# Patient Record
Sex: FEMALE | Race: WHITE | NOT HISPANIC OR LATINO | Employment: OTHER | URBAN - METROPOLITAN AREA
[De-identification: names, ages, dates, MRNs, and addresses within clinical notes are randomized per-mention and may not be internally consistent; named-entity substitution may affect disease eponyms.]

---

## 2017-02-07 ENCOUNTER — TELEPHONE (OUTPATIENT)
Dept: PHYSICAL MEDICINE AND REHAB | Facility: CLINIC | Age: 37
End: 2017-02-07

## 2017-02-07 DIAGNOSIS — S78.112A ABOVE KNEE AMPUTATION OF LEFT LOWER EXTREMITY: Primary | ICD-10-CM

## 2017-02-07 NOTE — TELEPHONE ENCOUNTER
----- Message from Deya Jackson MA sent at 2/7/2017 11:04 AM CST -----  Contact: pt [phone]  Would like order to restart PT. Her therapist recommended she stop for awhile now she is ready to start again. [facility]      ----- Message -----     From: Neeru Rust     Sent: 2/6/2017   3:35 PM       To: Angela VELAZQUEZ Staff    Pt is calling to speak with nurse regarding a order for physical therapy.pls call

## 2017-05-17 ENCOUNTER — TELEPHONE (OUTPATIENT)
Dept: PHYSICAL MEDICINE AND REHAB | Facility: HOSPITAL | Age: 37
End: 2017-05-17

## 2017-05-17 DIAGNOSIS — R26.81 GAIT INSTABILITY: Primary | ICD-10-CM

## 2017-05-17 NOTE — TELEPHONE ENCOUNTER
----- Message from Deya Jackson MA sent at 5/16/2017  4:26 PM CDT -----  Contact: Self [phone]      ----- Message -----     From: Ian Aadms     Sent: 5/16/2017   4:11 PM       To: Angela VELAZQUEZ Staff    Patient is calling to get orders to go back to PT.

## 2017-05-25 ENCOUNTER — TELEPHONE (OUTPATIENT)
Dept: PHYSICAL MEDICINE AND REHAB | Facility: CLINIC | Age: 37
End: 2017-05-25

## 2017-05-25 NOTE — TELEPHONE ENCOUNTER
----- Message from Deya Jackson MA sent at 5/19/2017  9:32 AM CDT -----  Contact: Patient [phone]   Herminia velazquez message asking for the fax # to the PT location where the patient want the orders faxed.     ----- Message -----     From: Giovana Lebron     Sent: 5/18/2017   4:37 PM       To: Angela VELAZQUEZ Staff    Patient is calling back in regards to the order request for her Physical Therapy, patient states that [facility] has not receive the orders yet. Please call patient states if she misses the call can a detailed message be left on voicemail.

## 2017-05-25 NOTE — TELEPHONE ENCOUNTER
Orders faxed to  [phone]    [facility]  [address]  [place] [address]  Phone [phone]

## 2017-05-30 ENCOUNTER — CLINICAL SUPPORT (OUTPATIENT)
Dept: REHABILITATION | Facility: HOSPITAL | Age: 37
End: 2017-05-30
Attending: PHYSICAL MEDICINE & REHABILITATION
Payer: MEDICAID

## 2017-05-30 DIAGNOSIS — R26.81 GAIT INSTABILITY: Primary | ICD-10-CM

## 2017-05-30 DIAGNOSIS — R19.8 ABDOMINAL WEAKNESS: ICD-10-CM

## 2017-05-30 PROCEDURE — 97161 PT EVAL LOW COMPLEX 20 MIN: CPT | Mod: PO

## 2017-05-30 NOTE — PATIENT INSTRUCTIONS
"Abdominal "X" Crunch (TFA)        Tighten stomach muscles to tilt pelvis and flatten back. Lift shoulder toward opposite hip. Repeat to other side.  Hold 5 seconds each side. Repeat 10 times each side, alternating. Do 2-3 sessions per day.    Copyright © I. All rights reserved.     Bridging / Pelvic Rotation (TFA)        Lift sound hip up while allowing residual limb hip to hang. Then lift residual limb hip as high as possible.  Hold 10 seconds. Repeat 15 times. Do 2-3 sessions per day.    Copyright © I. All rights reserved.   Push-Up (Kneeling) TFA        On sound knee and hands, with back straight, lower body toward surface and push up. Continue breathing normally.  Repeat 2 x 10 times. Do 2-3 sessions per day.    Copyright © [employer]. All rights reserved.   Prone Plank (Eccentric)        On toes and elbows, pull abdomen in while stabilizing trunk.    Hold 30 seconds working up to 60 seconds,  10 times, 2-3 sets per day.      [email or web address]     Copyright © I. All rights reserved.     "

## 2017-05-30 NOTE — PROGRESS NOTES
OUTPATIENT NEUROLOGICAL REHABILITATION  PHYSICAL THERAPY EVALUATION    Name: Val Gil  Clinic Number: [id number]    Diagnosis:   Encounter Diagnoses   Name Primary?    Abdominal weakness     Gait instability Yes     Physician: Shay Miller MD  Treatment Orders: PT Eval and Treat  Past Medical History:   Diagnosis Date    Angiosarcoma     Angiosarcoma     Anxiety     Bone cancer     Depression        Evaluation Date: 5/31/17  Visit #: 1  Plan of care expiration: 7/25/17  Precautions: fall risk    History     Medical Diagnosis: Gait instability, s/p R hip disarticulation amputation   PT Diagnosis: core weakness, gait instability    History of Present Illness: Val is a 36 y.o. female that presents to [facility] secondary to core weakness and deconditioning s/p R hip disarticulation amputation. She has been approved to go see a specialist for an intensive 2 week prosthetic leg fitting and training end of July but was told she needs to be in shape to tolerate the prosthetic leg training. Minimum requirements from the facility in [place] that she will be attending are to be able to do 200 crutch steps with minimal fatigue, and 100 crunches daily.     Chief complaints:  1. Improving core strength in prep for prosthetic training  2. Improving gait endurance in prep for prosthetic training     Prior Therapy: OP PT 1 year ago  Nutrition:  Overweight  Social History/Support systems: living with her Dad  Place of Residence (Steps/Adaptations/Levels): ramps to enter home  Exercise routine: not doing any formal exercising - but in general active in errands around the community.   DME owned: crutches, manual WC    Subjective   Pt stated goals: To improve core strength in prep for prosthetic training   Pain: phantom pain 5/10 - constant but fluctuates     Objective   - Follows commands: 100% of time   - Speech: no deficits     Mental status: alert, oriented to person, place, and  time  Appearance: Casually dressed  Behavior:  cooperative  Attention Span and Concentration:  Normal    Dominant hand:  right     Sensation: Light Touch: intact at intact limbs, phantom pain at removed R LE         ROM:   UPPER EXTREMITY--AROM/PROM  (R) UE: WNLs  (L) UE: WNLs         RANGE OF MOTION--LOWER EXTREMITIES  (R) LE - N/A    (L) LE: Hip: normal   Knee: normal   Ankle: normal    Strength: manual muscle test grades below   Upper Extremity Strength  (R) UE  (L) UE    Shoulder Flexion: 5/5 Shoulder Flexion: 5/5   Shoulder Abduction: 5/5 Shoulder Abduction: 5/5   Shoulder Extension: 5/5 Shoulder Extension:   5/5   Elbow Flexion: 5/5 Elbow Flexion: 5/5   Elbow Extension: 5/5 Elbow Extension: 5/5   Wrist Flexion: 5/5 Wrist Flexion: 5/5   Wrist Extension: 5/5 Wrist Extension: 5/5   : 5/5 : 5/5     Lower Extremity Strength  Right LE-   N/A - amputee  Left LE     Hip Flexion: 5/5    Hip Extension: 5/5    Hip Abduction: 5/5    Hip Adduction 5/5    Knee Extension: 5/5    Knee Flexion: 5/5    Ankle Dorsiflexion: 5/5    Ankle Plantarflexion: 5/5     Abdominal Strength: 2+ Poor + Arms Able to lift head and clear scapulae x 10   Upper Abdominal Strength Grades    5 Normal Arm behind head x 10 reps  4+ Good + Arm across chest x 10 reps  4 Good Arm across chest x 5 reps  4- Good - Arm across chest x 1 reps  3+ Fair + Arms extended out in front x 10  3 Fair Arms extended out in front x 5  3- Fair - Arms extended out in front x 1  2+ Poor + Arms Able to lift head and clear scapulae x 10  2 Poor Able to lift head and clear scpulae x 5  2- Poor - Able to lift head and clear scpulae x 1  1 Trace Able to lift head only  0 Zero No contraction     Plank tolerance/ endurance: 43 sec, 17 sec, 27 sec     Flexibility: WNL    Gait Assessment:   - AD used: crutches  - Assistance: Nuris  - Distance: 250', moderate fatigue with mild shortness of breath at end of walk.     GAIT DEVIATIONS:  Val displays the following deviations  with ambulation: crutch walks with B crutches and LLE hop.     Endurance Deficit: moderate impairment     Functional Mobility (Bed mobility, transfers)  Bed mobility: I  Supine to sit: I  Sit to supine: I  Transfers to bed: I  Transfers to toilet: I  Sit to stand:  Mod I  Stand pivot:  Mod I  Car transfers: Mod I  Wheelchair mobility: Mod I    CMS Impairment/Limitation/Restriction for FOTO Lower Leg (w/o Knee) Survey  Status Limitation    Intake 42% 58%    Predicted 51% 49%          Education provided re:role of PT, goals for PT, scheduling - pt verbalized understanding. Discussed insurance limitations with pt.     Val verbalized good understanding of education provided.   Pt has no cultural, educational or language barriers to learning provided.      Assessment   This is a 36 y.o. female referred to outpatient physical therapy and presents with a medical diagnosis of core weakness and gait instability s/p R hip disarticulation >1 year ago.  Patient presents with a decline in her core strength since last discharged from therapy. She did not do well with the attempt to get her a prosthetic last year and has been accepted to a facility in [place] the end of July for an intensive training and new prosthetic. She needs to be in shape to tolerated the prosthetic training there which is why she is returning to this clinic. PT is warranted to address core strength, gait endurance and establish HEP.    PT/PTA met face to face to discuss pt's treatment plan and established goals. Pt will be seen by physical therapy every 6th visit and minimally once per month.     Pt rehab potential is Good. Pt will benefit from continuing skilled outpatient physical therapy to address the deficits listed below in the problem list, provide pt/family education and to maximize pt's level of independence in the home and community environment.     History  Co-morbidities and personal factors that may impact the plan of care Examination  Body  Structures and Functions, activity limitations and participation restrictions that may impact the plan of care. Medical necessity is demonstrated by the following impairments. Clinical Presentation Decision Making/ Complexity Score   1. Hx poor compliance with HEP and therapy attendance  2. Anxiety/ mood disorder 1. Core weakness  2. Impaired gait endurance   stable    moderate low low low       Pt's spiritual, cultural and educational needs considered and pt agreeable to plan of care and goals as stated below:     GOALS:   Short term goals: 4 weeks, pt agrees to goals set.  3. Pt will improve core strength as indicated by plank time to at least 45 seconds, 3 reps in a row in order to better tolerate prosthetic training.   4. Pt will improve core MMT to at least 3/5 in order to better tolerate prosthetic training.   5. Pt will ambulate x 500' with minimal SOB using crutches in order to better tolerate prosthetic training.   6. Pt will have HEP in place for core strength.     Long term goals: 8 weeks, pt agrees to goals set  7. Pt will improve core strength as indicated by plank time to at least 60 seconds, 3 reps in a row in order to better tolerate prosthetic training.   8. Pt will improve core MMT to at least 4-/5 in order to better tolerate prosthetic training.   9. Pt will ambulate x 1000' with minimal SOB using crutches in order to better tolerate prosthetic training.   10. Pt will be independent with HEP at discharge.       Plan   Outpatient physical therapy 2-3 times weekly to include: Pt Education, HEP, therapeutic exercises, neuromuscular re-education, therapeutic activities, manual therapy, joint mobilizations, aquatic therapy and modalities PRN to achieve established goals. Pt may be seen by PTA as part of the rehabilitation team.     Cont PT for up to 8 weeks.     Abril Rider, PT  05/31/2017

## 2017-05-31 PROBLEM — R19.8 ABDOMINAL WEAKNESS: Status: ACTIVE | Noted: 2017-05-31

## 2017-05-31 NOTE — PLAN OF CARE
OUTPATIENT NEUROLOGICAL REHABILITATION  PHYSICAL THERAPY EVALUATION    Name: Val Gil  Clinic Number: [id number]    Diagnosis:   Encounter Diagnoses   Name Primary?    Abdominal weakness     Gait instability Yes     Physician: Shay Miller MD  Treatment Orders: PT Eval and Treat  Past Medical History:   Diagnosis Date    Angiosarcoma     Angiosarcoma     Anxiety     Bone cancer     Depression        Evaluation Date: 5/31/17  Visit #: 1  Plan of care expiration: 7/25/17  Precautions: fall risk    History     Medical Diagnosis: Gait instability, s/p R hip disarticulation amputation   PT Diagnosis: core weakness, gait instability    History of Present Illness: Val is a 36 y.o. female that presents to [facility] secondary to core weakness and deconditioning s/p R hip disarticulation amputation. She has been approved to go see a specialist for an intensive 2 week prosthetic leg fitting and training end of July but was told she needs to be in shape to tolerate the prosthetic leg training. Minimum requirements from the facility in [place] that she will be attending are to be able to do 200 crutch steps with minimal fatigue, and 100 crunches daily.     Chief complaints:  1. Improving core strength in prep for prosthetic training  2. Improving gait endurance in prep for prosthetic training     Prior Therapy: OP PT 1 year ago  Nutrition:  Overweight  Social History/Support systems: living with her Dad  Place of Residence (Steps/Adaptations/Levels): ramps to enter home  Exercise routine: not doing any formal exercising - but in general active in errands around the community.   DME owned: crutches, manual WC    Subjective   Pt stated goals: To improve core strength in prep for prosthetic training   Pain: phantom pain 5/10 - constant but fluctuates     Objective   - Follows commands: 100% of time   - Speech: no deficits     Mental status: alert, oriented to person, place, and  time  Appearance: Casually dressed  Behavior:  cooperative  Attention Span and Concentration:  Normal    Dominant hand:  right     Sensation: Light Touch: intact at intact limbs, phantom pain at removed R LE         ROM:   UPPER EXTREMITY--AROM/PROM  (R) UE: WNLs  (L) UE: WNLs         RANGE OF MOTION--LOWER EXTREMITIES  (R) LE - N/A    (L) LE: Hip: normal   Knee: normal   Ankle: normal    Strength: manual muscle test grades below   Upper Extremity Strength  (R) UE  (L) UE    Shoulder Flexion: 5/5 Shoulder Flexion: 5/5   Shoulder Abduction: 5/5 Shoulder Abduction: 5/5   Shoulder Extension: 5/5 Shoulder Extension:   5/5   Elbow Flexion: 5/5 Elbow Flexion: 5/5   Elbow Extension: 5/5 Elbow Extension: 5/5   Wrist Flexion: 5/5 Wrist Flexion: 5/5   Wrist Extension: 5/5 Wrist Extension: 5/5   : 5/5 : 5/5     Lower Extremity Strength  Right LE-   N/A - amputee  Left LE     Hip Flexion: 5/5    Hip Extension: 5/5    Hip Abduction: 5/5    Hip Adduction 5/5    Knee Extension: 5/5    Knee Flexion: 5/5    Ankle Dorsiflexion: 5/5    Ankle Plantarflexion: 5/5     Abdominal Strength: 2+ Poor + Arms Able to lift head and clear scapulae x 10   Upper Abdominal Strength Grades    5 Normal Arm behind head x 10 reps  4+ Good + Arm across chest x 10 reps  4 Good Arm across chest x 5 reps  4- Good - Arm across chest x 1 reps  3+ Fair + Arms extended out in front x 10  3 Fair Arms extended out in front x 5  3- Fair - Arms extended out in front x 1  2+ Poor + Arms Able to lift head and clear scapulae x 10  2 Poor Able to lift head and clear scpulae x 5  2- Poor - Able to lift head and clear scpulae x 1  1 Trace Able to lift head only  0 Zero No contraction     Plank tolerance/ endurance: 43 sec, 17 sec, 27 sec     Flexibility: WNL    Gait Assessment:   - AD used: crutches  - Assistance: Nuris  - Distance: 250', moderate fatigue with mild shortness of breath at end of walk.     GAIT DEVIATIONS:  Val displays the following deviations  with ambulation: crutch walks with B crutches and LLE hop.     Endurance Deficit: moderate impairment     Functional Mobility (Bed mobility, transfers)  Bed mobility: I  Supine to sit: I  Sit to supine: I  Transfers to bed: I  Transfers to toilet: I  Sit to stand:  Mod I  Stand pivot:  Mod I  Car transfers: Mod I  Wheelchair mobility: Mod I    CMS Impairment/Limitation/Restriction for FOTO Lower Leg (w/o Knee) Survey  Status Limitation    Intake 42% 58%    Predicted 51% 49%          Education provided re:role of PT, goals for PT, scheduling - pt verbalized understanding. Discussed insurance limitations with pt.     Val verbalized good understanding of education provided.   Pt has no cultural, educational or language barriers to learning provided.      Assessment   This is a 36 y.o. female referred to outpatient physical therapy and presents with a medical diagnosis of core weakness and gait instability s/p R hip disarticulation >1 year ago.  Patient presents with a decline in her core strength since last discharged from therapy. She did not do well with the attempt to get her a prosthetic last year and has been accepted to a facility in [place] the end of July for an intensive training and new prosthetic. She needs to be in shape to tolerated the prosthetic training there which is why she is returning to this clinic. PT is warranted to address core strength, gait endurance and establish HEP.    PT/PTA met face to face to discuss pt's treatment plan and established goals. Pt will be seen by physical therapy every 6th visit and minimally once per month.     Pt rehab potential is Good. Pt will benefit from continuing skilled outpatient physical therapy to address the deficits listed below in the problem list, provide pt/family education and to maximize pt's level of independence in the home and community environment.     History  Co-morbidities and personal factors that may impact the plan of care Examination  Body  Structures and Functions, activity limitations and participation restrictions that may impact the plan of care. Medical necessity is demonstrated by the following impairments. Clinical Presentation Decision Making/ Complexity Score   1. Hx poor compliance with HEP and therapy attendance  2. Anxiety/ mood disorder 1. Core weakness  2. Impaired gait endurance   stable    moderate low low low       Pt's spiritual, cultural and educational needs considered and pt agreeable to plan of care and goals as stated below:     GOALS:   Short term goals: 4 weeks, pt agrees to goals set.  3. Pt will improve core strength as indicated by plank time to at least 45 seconds, 3 reps in a row in order to better tolerate prosthetic training.   4. Pt will improve core MMT to at least 3/5 in order to better tolerate prosthetic training.   5. Pt will ambulate x 500' with minimal SOB using crutches in order to better tolerate prosthetic training.   6. Pt will have HEP in place for core strength.     Long term goals: 8 weeks, pt agrees to goals set  7. Pt will improve core strength as indicated by plank time to at least 60 seconds, 3 reps in a row in order to better tolerate prosthetic training.   8. Pt will improve core MMT to at least 4-/5 in order to better tolerate prosthetic training.   9. Pt will ambulate x 1000' with minimal SOB using crutches in order to better tolerate prosthetic training.   10. Pt will be independent with HEP at discharge.       Plan   Outpatient physical therapy 2-3 times weekly to include: Pt Education, HEP, therapeutic exercises, neuromuscular re-education, therapeutic activities, manual therapy, joint mobilizations, aquatic therapy and modalities PRN to achieve established goals. Pt may be seen by PTA as part of the rehabilitation team.     Cont PT for up to 8 weeks.     Abril Rider, PT  05/31/2017

## 2017-06-06 ENCOUNTER — CLINICAL SUPPORT (OUTPATIENT)
Dept: REHABILITATION | Facility: HOSPITAL | Age: 37
End: 2017-06-06
Attending: PHYSICAL MEDICINE & REHABILITATION
Payer: MEDICAID

## 2017-06-06 DIAGNOSIS — R19.8 ABDOMINAL WEAKNESS: ICD-10-CM

## 2017-06-06 DIAGNOSIS — R26.81 GAIT INSTABILITY: ICD-10-CM

## 2017-06-06 PROCEDURE — 97110 THERAPEUTIC EXERCISES: CPT | Mod: PO

## 2017-06-06 NOTE — PROGRESS NOTES
"                                                    Physical Therapy Progress Note     Name: Val Gil  Clinic Number: [id number]  Diagnosis:   Encounter Diagnoses   Name Primary?    Abdominal weakness     Gait instability      Physician: Shay Miller MD  Treatment Orders: PT Eval and Treat  Past Medical History:   Diagnosis Date    Angiosarcoma     Angiosarcoma     Anxiety     Bone cancer     Depression        Evaluation Date: 17  Visit #: 2  Plan of care expiration: 17  Precautions: fall risk    FOTO 2/10          Subjective   Pt reports: "Sorry I am late. I might never get that right." Pt reports compliance with HEP 3 days since last visit (every other day).   Pain Scale:  0/10    Pt late to session 15 min  Objective     Patient received individual therapy with activities as follows:     Val received therapeutic exercises to develop strength, endurance and core stabilization for 38 minutes includin/6/17   Bridges  x 2 min, 10 sec holds   Push ups on knee x 15, x4 (fatigued 2nd set)   Crunches  3 x 15 early in session (45)  3 x 20 mid session (60)   Isometric LTR X 2 min manual   Planks 33 sec, 45 sec, 35 sec   updog abdominal stretch 2 x15 sec   bicycles 3 x 10   ck pose X 60 sec   Forearm stretches    Crutch steps  100 steps, 7-8/10 [place]                     Written Home Exercises: Given at first session, see pt instructions 17.   Pt demo good understanding of the education provided. Val demonstrated good return demonstration of activities.     Education provided re: POC, HEP  No spiritual or educational barriers to learning provided    Pt has no cultural, educational or language barriers to learning provided.    Assessment   Val was late to her session which limited the total amount of exercises completed today. Reinforced with her the need to arrive on time for optimal benefit of therapy especially considering the point is to get her conditioned enough " for her upcoming intense 2 weeks for prosthetic fitting and training in [place]. She was able to complete 100 total crunches today but needed them spread throughout the 38 min. She was able to do the 100 crutch steps but was very fatigued and rated her effort up to a 8/10 on the [place]. She was short of breath after. Continue tx for core strength and endurance training.     Pt rehab potential is Good. Pt will benefit from continuing skilled outpatient physical therapy to address the deficits listed below in the problem list, provide pt/family education and to maximize pt's level of independence in the home and community environment.      History  Co-morbidities and personal factors that may impact the plan of care Examination  Body Structures and Functions, activity limitations and participation restrictions that may impact the plan of care. Medical necessity is demonstrated by the following impairments. Clinical Presentation Decision Making/ Complexity Score   1. Hx poor compliance with HEP and therapy attendance  2. Anxiety/ mood disorder 1. Core weakness  2. Impaired gait endurance stable     moderate low low low         Pt's spiritual, cultural and educational needs considered and pt agreeable to plan of care and goals as stated below:      GOALS:   Short term goals: 4 weeks, pt agrees to goals set.  3. Pt will improve core strength as indicated by plank time to at least 45 seconds, 3 reps in a row in order to better tolerate prosthetic training.   4. Pt will improve core MMT to at least 3/5 in order to better tolerate prosthetic training.   5. Pt will ambulate x 500' with minimal SOB using crutches in order to better tolerate prosthetic training.   6. Pt will have HEP in place for core strength.      Long term goals: 8 weeks, pt agrees to goals set  7. Pt will improve core strength as indicated by plank time to at least 60 seconds, 3 reps in a row in order to better tolerate prosthetic training.   8. Pt will improve  core MMT to at least 4-/5 in order to better tolerate prosthetic training.   9. Pt will ambulate x 1000' with minimal SOB using crutches in order to better tolerate prosthetic training.   10. Pt will be independent with HEP at discharge.     Plan   Continue PT 2-3x weekly under established Plan of Care, with treatment to include: pt education, HEP, therapeutic exercises, neuromuscular re-education/balance exercises, therapeutic activities, joint mobilizations, and modalities PRN, to work towards established goals. Pt may be seen by PTA to carry out plan of care.     Abril Rider, PT   06/06/2017

## 2017-06-06 NOTE — PATIENT INSTRUCTIONS
Child's Pose        Sit in knee-chest position and reach arms forward. Separate knees for comfort. Hold position for up to 60 seconds.    Copyright © I. All rights reserved.       Cobra        Lie face down, hands at shoulder. Inhale and press up torso, arching back. Keep long curve in neck, shoulders down, and buttocks engaged to protect lower back. Hold up to 60 seconds.     Copyright © [employer]. All rights reserved.       Wrist Extensors        Elbow straight, palm down. Place other hand with thumb on underside of wrist and fingers on back of hand. Slowly bend wrist down until stretch is felt on top of forearm. Hold 20 seconds.      Copyright © [employer]. All rights reserved.

## 2017-07-11 ENCOUNTER — DOCUMENTATION ONLY (OUTPATIENT)
Dept: REHABILITATION | Facility: HOSPITAL | Age: 37
End: 2017-07-11

## 2017-07-11 DIAGNOSIS — R19.8 ABDOMINAL WEAKNESS: ICD-10-CM

## 2017-07-11 DIAGNOSIS — R26.81 GAIT INSTABILITY: ICD-10-CM

## 2017-07-11 NOTE — PROGRESS NOTES
OUTPATIENT PHYSICAL THERAPY DISCHARGE SUMMARY     Name: Val Gil  Clinic Number: [id number]    Diagnosis:   Encounter Diagnoses   Name Primary?    Abdominal weakness     Gait instability      Physician: No ref. provider found  Treatment Orders: PT Eval and Treat  Past Medical History:   Diagnosis Date    Angiosarcoma     Angiosarcoma     Anxiety     Bone cancer     Depression        Initial visit: 5/30/17  Date of Last visit: 6/6/17  Date of Discharge Note:  7/11/17  Total Visits Received: 2  Missed Visits: 7  ASSESSMENT   Status Towards Goals Met:  Val only attended the evaluation and 1 follow up session, both of which were limited in time due to patient arriving late each session. We were able to establish an HEP for core strengthening at her follow up visit.     Goals Not achieved and why: No goals met or re-assessed due to patient non-compliance with attendance. She had 7 no shows and cancellations. Informed patient via phone that due to her non-compliance we were discharging.     Discharge reason : Non-Compliance with attendance    PLAN   This patient is discharged from Outpatient Physical Therapy Services.     Abril Rider, PT  07/11/2017

## 2017-12-28 ENCOUNTER — HOSPITAL ENCOUNTER (INPATIENT)
Facility: HOSPITAL | Age: 37
LOS: 7 days | Discharge: PSYCHIATRIC OR SUBSTANCE USE FACILITY | DRG: 917 | End: 2018-01-04
Attending: EMERGENCY MEDICINE | Admitting: INTERNAL MEDICINE
Payer: MEDICAID

## 2017-12-28 DIAGNOSIS — F11.221 OPIOID DEPENDENCE WITH INTOXICATION DELIRIUM: ICD-10-CM

## 2017-12-28 DIAGNOSIS — F13.20 BENZODIAZEPINE DEPENDENCE: ICD-10-CM

## 2017-12-28 DIAGNOSIS — G92.9 ENCEPHALOPATHY, TOXIC: ICD-10-CM

## 2017-12-28 DIAGNOSIS — F32.A DEPRESSION, UNSPECIFIED DEPRESSION TYPE: ICD-10-CM

## 2017-12-28 DIAGNOSIS — I49.9 ARRHYTHMIA: ICD-10-CM

## 2017-12-28 DIAGNOSIS — T43.012A: ICD-10-CM

## 2017-12-28 DIAGNOSIS — D72.829 LEUKOCYTOSIS, UNSPECIFIED TYPE: ICD-10-CM

## 2017-12-28 DIAGNOSIS — T50.901A OVERDOSE: ICD-10-CM

## 2017-12-28 DIAGNOSIS — T50.912A SUICIDE ATTEMPT BY MULTIPLE DRUG OVERDOSE, INITIAL ENCOUNTER: ICD-10-CM

## 2017-12-28 DIAGNOSIS — F11.20 OPIOID DEPENDENCE IN CONTROLLED ENVIRONMENT: ICD-10-CM

## 2017-12-28 DIAGNOSIS — T39.1X2A ACETAMINOPHEN OVERDOSE, INTENTIONAL SELF-HARM, INITIAL ENCOUNTER: ICD-10-CM

## 2017-12-28 DIAGNOSIS — T50.912A: ICD-10-CM

## 2017-12-28 LAB
ALBUMIN SERPL BCP-MCNC: 3.9 G/DL
ALP SERPL-CCNC: 125 U/L
ALT SERPL W/O P-5'-P-CCNC: 32 U/L
AMPHET+METHAMPHET UR QL: NEGATIVE
ANION GAP SERPL CALC-SCNC: 10 MMOL/L
APAP SERPL-MCNC: 21 UG/ML
AST SERPL-CCNC: 49 U/L
B-HCG UR QL: NEGATIVE
BARBITURATES UR QL SCN>200 NG/ML: NEGATIVE
BASOPHILS # BLD AUTO: 0.03 K/UL
BASOPHILS NFR BLD: 0.2 %
BENZODIAZ UR QL SCN>200 NG/ML: NORMAL
BILIRUB SERPL-MCNC: 0.6 MG/DL
BILIRUB UR QL STRIP: NEGATIVE
BUN SERPL-MCNC: 11 MG/DL
BZE UR QL SCN: NEGATIVE
CALCIUM SERPL-MCNC: 9.7 MG/DL
CANNABINOIDS UR QL SCN: NEGATIVE
CHLORIDE SERPL-SCNC: 101 MMOL/L
CLARITY UR REFRACT.AUTO: CLEAR
CO2 SERPL-SCNC: 26 MMOL/L
COLOR UR AUTO: NORMAL
CREAT SERPL-MCNC: 0.9 MG/DL
CREAT UR-MCNC: 28 MG/DL
CTP QC/QA: YES
DIFFERENTIAL METHOD: ABNORMAL
EOSINOPHIL # BLD AUTO: 0 K/UL
EOSINOPHIL NFR BLD: 0 %
ERYTHROCYTE [DISTWIDTH] IN BLOOD BY AUTOMATED COUNT: 11.9 %
EST. GFR  (AFRICAN AMERICAN): >60 ML/MIN/1.73 M^2
EST. GFR  (NON AFRICAN AMERICAN): >60 ML/MIN/1.73 M^2
GLUCOSE SERPL-MCNC: 103 MG/DL
GLUCOSE UR QL STRIP: NEGATIVE
HCT VFR BLD AUTO: 42.7 %
HGB BLD-MCNC: 14.6 G/DL
HGB UR QL STRIP: NEGATIVE
IMM GRANULOCYTES # BLD AUTO: 0.06 K/UL
IMM GRANULOCYTES NFR BLD AUTO: 0.3 %
KETONES UR QL STRIP: NEGATIVE
LEUKOCYTE ESTERASE UR QL STRIP: NEGATIVE
LYMPHOCYTES # BLD AUTO: 0.4 K/UL
LYMPHOCYTES NFR BLD: 2.3 %
MCH RBC QN AUTO: 29.9 PG
MCHC RBC AUTO-ENTMCNC: 34.2 G/DL
MCV RBC AUTO: 87 FL
METHADONE UR QL SCN>300 NG/ML: NEGATIVE
MONOCYTES # BLD AUTO: 0.3 K/UL
MONOCYTES NFR BLD: 1.8 %
NEUTROPHILS # BLD AUTO: 17 K/UL
NEUTROPHILS NFR BLD: 95.4 %
NITRITE UR QL STRIP: NEGATIVE
NRBC BLD-RTO: 0 /100 WBC
OPIATES UR QL SCN: NORMAL
PCP UR QL SCN>25 NG/ML: NEGATIVE
PH UR STRIP: 7 [PH] (ref 5–8)
PLATELET # BLD AUTO: 315 K/UL
PMV BLD AUTO: 10.2 FL
POCT GLUCOSE: 83 MG/DL (ref 70–110)
POTASSIUM SERPL-SCNC: 3.7 MMOL/L
PROT SERPL-MCNC: 7.9 G/DL
PROT UR QL STRIP: NEGATIVE
RBC # BLD AUTO: 4.89 M/UL
SALICYLATES SERPL-MCNC: <5 MG/DL
SODIUM SERPL-SCNC: 137 MMOL/L
SP GR UR STRIP: 1 (ref 1–1.03)
TOXICOLOGY INFORMATION: NORMAL
URN SPEC COLLECT METH UR: NORMAL
UROBILINOGEN UR STRIP-ACNC: NEGATIVE EU/DL
WBC # BLD AUTO: 17.85 K/UL

## 2017-12-28 PROCEDURE — 80307 DRUG TEST PRSMV CHEM ANLYZR: CPT

## 2017-12-28 PROCEDURE — 85025 COMPLETE CBC W/AUTO DIFF WBC: CPT

## 2017-12-28 PROCEDURE — 80320 DRUG SCREEN QUANTALCOHOLS: CPT

## 2017-12-28 PROCEDURE — 81003 URINALYSIS AUTO W/O SCOPE: CPT

## 2017-12-28 PROCEDURE — 81025 URINE PREGNANCY TEST: CPT | Performed by: EMERGENCY MEDICINE

## 2017-12-28 PROCEDURE — 96375 TX/PRO/DX INJ NEW DRUG ADDON: CPT

## 2017-12-28 PROCEDURE — 25000003 PHARM REV CODE 250: Performed by: EMERGENCY MEDICINE

## 2017-12-28 PROCEDURE — 12000002 HC ACUTE/MED SURGE SEMI-PRIVATE ROOM

## 2017-12-28 PROCEDURE — 96365 THER/PROPH/DIAG IV INF INIT: CPT | Mod: 59

## 2017-12-28 PROCEDURE — 96372 THER/PROPH/DIAG INJ SC/IM: CPT

## 2017-12-28 PROCEDURE — 96368 THER/DIAG CONCURRENT INF: CPT

## 2017-12-28 PROCEDURE — 96366 THER/PROPH/DIAG IV INF ADDON: CPT

## 2017-12-28 PROCEDURE — 80053 COMPREHEN METABOLIC PANEL: CPT

## 2017-12-28 PROCEDURE — 99285 EMERGENCY DEPT VISIT HI MDM: CPT | Mod: 25

## 2017-12-28 PROCEDURE — 80329 ANALGESICS NON-OPIOID 1 OR 2: CPT

## 2017-12-28 PROCEDURE — 96367 TX/PROPH/DG ADDL SEQ IV INF: CPT

## 2017-12-28 PROCEDURE — 93010 ELECTROCARDIOGRAM REPORT: CPT | Mod: ,,, | Performed by: INTERNAL MEDICINE

## 2017-12-28 RX ADMIN — SODIUM CHLORIDE 1000 ML: 0.9 INJECTION, SOLUTION INTRAVENOUS at 08:12

## 2017-12-29 PROBLEM — D72.829 LEUCOCYTOSIS: Status: ACTIVE | Noted: 2017-12-29

## 2017-12-29 PROBLEM — G92.9 ENCEPHALOPATHY, TOXIC: Status: ACTIVE | Noted: 2017-12-29

## 2017-12-29 PROBLEM — F41.9 ANXIETY: Status: ACTIVE | Noted: 2017-12-29

## 2017-12-29 PROBLEM — F11.229 OPIOID DEPENDENCE WITH INTOXICATION: Status: ACTIVE | Noted: 2017-12-29

## 2017-12-29 PROBLEM — T50.912A SUICIDE ATTEMPT BY MULTIPLE DRUG OVERDOSE: Status: ACTIVE | Noted: 2017-12-28

## 2017-12-29 PROBLEM — T43.012A: Status: ACTIVE | Noted: 2017-12-29

## 2017-12-29 PROBLEM — F13.20 BENZODIAZEPINE DEPENDENCE: Status: ACTIVE | Noted: 2017-12-29

## 2017-12-29 PROBLEM — T39.1X2A ACETAMINOPHEN OVERDOSE, INTENTIONAL SELF-HARM, INITIAL ENCOUNTER: Status: ACTIVE | Noted: 2017-12-29

## 2017-12-29 PROBLEM — F19.90 IV DRUG USER: Status: ACTIVE | Noted: 2017-12-29

## 2017-12-29 LAB
ALBUMIN SERPL BCP-MCNC: 3 G/DL
ALBUMIN SERPL BCP-MCNC: 3.1 G/DL
ALBUMIN SERPL BCP-MCNC: 3.2 G/DL
ALBUMIN SERPL BCP-MCNC: 3.3 G/DL
ALLENS TEST: ABNORMAL
ALP SERPL-CCNC: 102 U/L
ALP SERPL-CCNC: 106 U/L
ALP SERPL-CCNC: 111 U/L
ALP SERPL-CCNC: 99 U/L
ALT SERPL W/O P-5'-P-CCNC: 38 U/L
ALT SERPL W/O P-5'-P-CCNC: 38 U/L
ALT SERPL W/O P-5'-P-CCNC: 40 U/L
ALT SERPL W/O P-5'-P-CCNC: 48 U/L
ANION GAP SERPL CALC-SCNC: 10 MMOL/L
ANION GAP SERPL CALC-SCNC: 10 MMOL/L
ANION GAP SERPL CALC-SCNC: 9 MMOL/L
ANION GAP SERPL CALC-SCNC: 9 MMOL/L
ANISOCYTOSIS BLD QL SMEAR: SLIGHT
APAP SERPL-MCNC: 19 UG/ML
APAP SERPL-MCNC: 33 UG/ML
APAP SERPL-MCNC: <3 UG/ML
AST SERPL-CCNC: 36 U/L
AST SERPL-CCNC: 51 U/L
BASOPHILS # BLD AUTO: 0.05 K/UL
BASOPHILS NFR BLD: 0.2 %
BILIRUB DIRECT SERPL-MCNC: 0.2 MG/DL
BILIRUB SERPL-MCNC: 0.3 MG/DL
BILIRUB SERPL-MCNC: 0.4 MG/DL
BUN SERPL-MCNC: 11 MG/DL
BUN SERPL-MCNC: 5 MG/DL
BUN SERPL-MCNC: 5 MG/DL
BUN SERPL-MCNC: 7 MG/DL
CALCIUM SERPL-MCNC: 8.8 MG/DL
CALCIUM SERPL-MCNC: 8.9 MG/DL
CALCIUM SERPL-MCNC: 9.1 MG/DL
CALCIUM SERPL-MCNC: 9.3 MG/DL
CHLORIDE SERPL-SCNC: 105 MMOL/L
CHLORIDE SERPL-SCNC: 108 MMOL/L
CHLORIDE SERPL-SCNC: 98 MMOL/L
CHLORIDE SERPL-SCNC: 98 MMOL/L
CO2 SERPL-SCNC: 23 MMOL/L
CO2 SERPL-SCNC: 29 MMOL/L
CO2 SERPL-SCNC: 37 MMOL/L
CO2 SERPL-SCNC: 38 MMOL/L
CREAT SERPL-MCNC: 0.7 MG/DL
CREAT SERPL-MCNC: 0.8 MG/DL
DELSYS: ABNORMAL
DIFFERENTIAL METHOD: ABNORMAL
EOSINOPHIL # BLD AUTO: 0 K/UL
EOSINOPHIL NFR BLD: 0 %
ERYTHROCYTE [DISTWIDTH] IN BLOOD BY AUTOMATED COUNT: 12 %
EST. GFR  (AFRICAN AMERICAN): >60 ML/MIN/1.73 M^2
EST. GFR  (NON AFRICAN AMERICAN): >60 ML/MIN/1.73 M^2
ETHANOL SERPL-MCNC: <10 MG/DL
GLUCOSE SERPL-MCNC: 120 MG/DL
GLUCOSE SERPL-MCNC: 168 MG/DL
GLUCOSE SERPL-MCNC: 177 MG/DL
GLUCOSE SERPL-MCNC: 98 MG/DL
HCO3 UR-SCNC: 27.5 MMOL/L (ref 24–28)
HCT VFR BLD AUTO: 36.5 %
HGB BLD-MCNC: 12.8 G/DL
HYPOCHROMIA BLD QL SMEAR: ABNORMAL
IMM GRANULOCYTES # BLD AUTO: 0.17 K/UL
IMM GRANULOCYTES NFR BLD AUTO: 0.7 %
INR PPP: 1.3
INR PPP: 1.3
LYMPHOCYTES # BLD AUTO: 1.1 K/UL
LYMPHOCYTES NFR BLD: 4.3 %
MAGNESIUM SERPL-MCNC: 1.9 MG/DL
MCH RBC QN AUTO: 30.2 PG
MCHC RBC AUTO-ENTMCNC: 35.1 G/DL
MCV RBC AUTO: 86 FL
MODE: ABNORMAL
MONOCYTES # BLD AUTO: 0.5 K/UL
MONOCYTES NFR BLD: 2.2 %
NEUTROPHILS # BLD AUTO: 22.8 K/UL
NEUTROPHILS NFR BLD: 92.6 %
NRBC BLD-RTO: 0 /100 WBC
OVALOCYTES BLD QL SMEAR: ABNORMAL
PCO2 BLDA: 37.1 MMHG (ref 35–45)
PH SMN: 7.48 [PH] (ref 7.35–7.45)
PHOSPHATE SERPL-MCNC: 1.2 MG/DL
PLATELET # BLD AUTO: 292 K/UL
PMV BLD AUTO: 9.6 FL
PO2 BLDA: 87 MMHG (ref 80–100)
POC BE: 4 MMOL/L
POC SATURATED O2: 97 % (ref 95–100)
POC TCO2: 29 MMOL/L (ref 23–27)
POIKILOCYTOSIS BLD QL SMEAR: SLIGHT
POLYCHROMASIA BLD QL SMEAR: ABNORMAL
POTASSIUM SERPL-SCNC: 2.2 MMOL/L
POTASSIUM SERPL-SCNC: 2.3 MMOL/L
POTASSIUM SERPL-SCNC: 2.6 MMOL/L
POTASSIUM SERPL-SCNC: 4.1 MMOL/L
PROT SERPL-MCNC: 6.4 G/DL
PROT SERPL-MCNC: 6.8 G/DL
PROT SERPL-MCNC: 6.8 G/DL
PROT SERPL-MCNC: 7 G/DL
PROTHROMBIN TIME: 13.1 SEC
PROTHROMBIN TIME: 13.5 SEC
RBC # BLD AUTO: 4.24 M/UL
SAMPLE: ABNORMAL
SITE: ABNORMAL
SODIUM SERPL-SCNC: 140 MMOL/L
SODIUM SERPL-SCNC: 144 MMOL/L
SODIUM SERPL-SCNC: 145 MMOL/L
SODIUM SERPL-SCNC: 145 MMOL/L
SP02: 99
T4 FREE SERPL-MCNC: 1.01 NG/DL
TRICYCLICS SERPL-MCNC: >500 NG/ML
TSH SERPL DL<=0.005 MIU/L-ACNC: 0.1 UIU/ML
WBC # BLD AUTO: 24.64 K/UL

## 2017-12-29 PROCEDURE — 80053 COMPREHEN METABOLIC PANEL: CPT | Mod: 91

## 2017-12-29 PROCEDURE — 25000003 PHARM REV CODE 250: Performed by: HOSPITALIST

## 2017-12-29 PROCEDURE — 85610 PROTHROMBIN TIME: CPT

## 2017-12-29 PROCEDURE — 11000001 HC ACUTE MED/SURG PRIVATE ROOM

## 2017-12-29 PROCEDURE — 80053 COMPREHEN METABOLIC PANEL: CPT

## 2017-12-29 PROCEDURE — 99222 1ST HOSP IP/OBS MODERATE 55: CPT | Mod: AF,HB,, | Performed by: PSYCHIATRY & NEUROLOGY

## 2017-12-29 PROCEDURE — 25000003 PHARM REV CODE 250: Performed by: INTERNAL MEDICINE

## 2017-12-29 PROCEDURE — 25000003 PHARM REV CODE 250

## 2017-12-29 PROCEDURE — 85025 COMPLETE CBC W/AUTO DIFF WBC: CPT

## 2017-12-29 PROCEDURE — 25000003 PHARM REV CODE 250: Performed by: STUDENT IN AN ORGANIZED HEALTH CARE EDUCATION/TRAINING PROGRAM

## 2017-12-29 PROCEDURE — 93010 ELECTROCARDIOGRAM REPORT: CPT | Mod: 77,,, | Performed by: INTERNAL MEDICINE

## 2017-12-29 PROCEDURE — 99291 CRITICAL CARE FIRST HOUR: CPT | Mod: ,,, | Performed by: INTERNAL MEDICINE

## 2017-12-29 PROCEDURE — 63600175 PHARM REV CODE 636 W HCPCS: Performed by: STUDENT IN AN ORGANIZED HEALTH CARE EDUCATION/TRAINING PROGRAM

## 2017-12-29 PROCEDURE — 82800 BLOOD PH: CPT

## 2017-12-29 PROCEDURE — 80175 DRUG SCREEN QUAN LAMOTRIGINE: CPT

## 2017-12-29 PROCEDURE — 80329 ANALGESICS NON-OPIOID 1 OR 2: CPT | Mod: 59

## 2017-12-29 PROCEDURE — 84439 ASSAY OF FREE THYROXINE: CPT

## 2017-12-29 PROCEDURE — 80076 HEPATIC FUNCTION PANEL: CPT

## 2017-12-29 PROCEDURE — 80329 ANALGESICS NON-OPIOID 1 OR 2: CPT

## 2017-12-29 PROCEDURE — 84100 ASSAY OF PHOSPHORUS: CPT

## 2017-12-29 PROCEDURE — 80048 BASIC METABOLIC PNL TOTAL CA: CPT

## 2017-12-29 PROCEDURE — 63600175 PHARM REV CODE 636 W HCPCS: Performed by: EMERGENCY MEDICINE

## 2017-12-29 PROCEDURE — 87040 BLOOD CULTURE FOR BACTERIA: CPT | Mod: 59

## 2017-12-29 PROCEDURE — 86703 HIV-1/HIV-2 1 RESULT ANTBDY: CPT

## 2017-12-29 PROCEDURE — 93010 ELECTROCARDIOGRAM REPORT: CPT | Mod: ,,, | Performed by: INTERNAL MEDICINE

## 2017-12-29 PROCEDURE — 82803 BLOOD GASES ANY COMBINATION: CPT

## 2017-12-29 PROCEDURE — 85610 PROTHROMBIN TIME: CPT | Mod: 91

## 2017-12-29 PROCEDURE — 80074 ACUTE HEPATITIS PANEL: CPT

## 2017-12-29 PROCEDURE — 36600 WITHDRAWAL OF ARTERIAL BLOOD: CPT

## 2017-12-29 PROCEDURE — 83735 ASSAY OF MAGNESIUM: CPT

## 2017-12-29 PROCEDURE — S4991 NICOTINE PATCH NONLEGEND: HCPCS | Performed by: INTERNAL MEDICINE

## 2017-12-29 PROCEDURE — 84443 ASSAY THYROID STIM HORMONE: CPT

## 2017-12-29 RX ORDER — ONDANSETRON 2 MG/ML
INJECTION INTRAMUSCULAR; INTRAVENOUS
Status: DISPENSED
Start: 2017-12-29 | End: 2017-12-29

## 2017-12-29 RX ORDER — ONDANSETRON 2 MG/ML
8 INJECTION INTRAMUSCULAR; INTRAVENOUS ONCE
Status: COMPLETED | OUTPATIENT
Start: 2017-12-29 | End: 2017-12-29

## 2017-12-29 RX ORDER — NALOXONE HCL 0.4 MG/ML
0.2 VIAL (ML) INJECTION
Status: DISCONTINUED | OUTPATIENT
Start: 2017-12-29 | End: 2017-12-29

## 2017-12-29 RX ORDER — MELOXICAM 15 MG/1
15 TABLET ORAL DAILY
Status: ON HOLD | COMMUNITY
End: 2018-01-03

## 2017-12-29 RX ORDER — TRIMETHOBENZAMIDE HCL 300 MG
300 CAPSULE ORAL 3 TIMES DAILY
Status: ON HOLD | COMMUNITY
End: 2018-01-03

## 2017-12-29 RX ORDER — ENOXAPARIN SODIUM 100 MG/ML
40 INJECTION SUBCUTANEOUS EVERY 24 HOURS
Status: DISCONTINUED | OUTPATIENT
Start: 2017-12-29 | End: 2018-01-04 | Stop reason: HOSPADM

## 2017-12-29 RX ORDER — POTASSIUM CHLORIDE 20 MEQ/1
40 TABLET, EXTENDED RELEASE ORAL 2 TIMES DAILY
Status: COMPLETED | OUTPATIENT
Start: 2017-12-29 | End: 2017-12-31

## 2017-12-29 RX ORDER — GABAPENTIN 100 MG/1
100 CAPSULE ORAL 3 TIMES DAILY
Status: ON HOLD | COMMUNITY
End: 2018-01-03

## 2017-12-29 RX ORDER — LORAZEPAM 2 MG/ML
2 INJECTION INTRAMUSCULAR EVERY 5 MIN PRN
Status: DISCONTINUED | OUTPATIENT
Start: 2017-12-29 | End: 2017-12-30

## 2017-12-29 RX ORDER — POTASSIUM CHLORIDE 20 MEQ/15ML
40 SOLUTION ORAL ONCE
Status: COMPLETED | OUTPATIENT
Start: 2017-12-29 | End: 2017-12-29

## 2017-12-29 RX ORDER — CELECOXIB 100 MG/1
100 CAPSULE ORAL 2 TIMES DAILY
Status: ON HOLD | COMMUNITY
End: 2018-01-03

## 2017-12-29 RX ORDER — NALOXONE HCL 0.4 MG/ML
0.2 VIAL (ML) INJECTION
Status: DISCONTINUED | OUTPATIENT
Start: 2017-12-29 | End: 2018-01-04 | Stop reason: HOSPADM

## 2017-12-29 RX ORDER — SODIUM CHLORIDE 0.9 % (FLUSH) 0.9 %
3 SYRINGE (ML) INJECTION
Status: DISCONTINUED | OUTPATIENT
Start: 2017-12-29 | End: 2018-01-04 | Stop reason: HOSPADM

## 2017-12-29 RX ORDER — CHLORDIAZEPOXIDE HYDROCHLORIDE 10 MG/1
10 CAPSULE, GELATIN COATED ORAL 3 TIMES DAILY PRN
Status: ON HOLD | COMMUNITY
End: 2018-01-03

## 2017-12-29 RX ORDER — SODIUM BICARBONATE 1 MEQ/ML
100 SYRINGE (ML) INTRAVENOUS ONCE
Status: COMPLETED | OUTPATIENT
Start: 2017-12-29 | End: 2017-12-29

## 2017-12-29 RX ORDER — NALOXONE HCL 0.4 MG/ML
0.2 VIAL (ML) INJECTION
Status: COMPLETED | OUTPATIENT
Start: 2017-12-29 | End: 2017-12-29

## 2017-12-29 RX ORDER — IBUPROFEN 200 MG
1 TABLET ORAL DAILY
Status: DISCONTINUED | OUTPATIENT
Start: 2017-12-29 | End: 2017-12-31

## 2017-12-29 RX ADMIN — SODIUM BICARBONATE 100 MEQ: 84 INJECTION, SOLUTION INTRAVENOUS at 06:12

## 2017-12-29 RX ADMIN — ONDANSETRON 8 MG: 2 INJECTION INTRAMUSCULAR; INTRAVENOUS at 03:12

## 2017-12-29 RX ADMIN — POTASSIUM CHLORIDE 40 MEQ: 20 SOLUTION ORAL at 04:12

## 2017-12-29 RX ADMIN — ACETYLCYSTEINE 6400 MG: 200 INJECTION INTRAVENOUS at 07:12

## 2017-12-29 RX ADMIN — NALOXONE HYDROCHLORIDE 0.2 MG: 0.4 INJECTION, SOLUTION INTRAMUSCULAR; INTRAVENOUS; SUBCUTANEOUS at 12:12

## 2017-12-29 RX ADMIN — POTASSIUM CHLORIDE 40 MEQ: 1500 TABLET, EXTENDED RELEASE ORAL at 11:12

## 2017-12-29 RX ADMIN — NICOTINE 1 PATCH: 21 PATCH, EXTENDED RELEASE TRANSDERMAL at 04:12

## 2017-12-29 RX ADMIN — ACETYLCYSTEINE 9500 MG: 200 INJECTION INTRAVENOUS at 01:12

## 2017-12-29 RX ADMIN — SODIUM BICARBONATE: 84 INJECTION, SOLUTION INTRAVENOUS at 06:12

## 2017-12-29 RX ADMIN — ENOXAPARIN SODIUM 40 MG: 100 INJECTION SUBCUTANEOUS at 04:12

## 2017-12-29 RX ADMIN — SODIUM PHOSPHATE, MONOBASIC, MONOHYDRATE 15 MMOL: 276; 142 INJECTION, SOLUTION INTRAVENOUS at 05:12

## 2017-12-29 RX ADMIN — ACETYLCYSTEINE 3200 MG: 200 INJECTION INTRAVENOUS at 02:12

## 2017-12-29 NOTE — ED TRIAGE NOTES
Pt found down at home by family. Family found suicide note on bed with pt stating she sprinkled something on her bed and wanted to ensure her dog did not get to substance. Pt found with empty bottles of Fentanyl and pain meds in room. Pt currently arousing to pain. Nasal trumpet placed due to snoring respirations. Pt currently sating at 96% room air with trumpet in place.

## 2017-12-29 NOTE — H&P
"[facility]  Critical Care Medicine  History & Physical    Patient Name: Val Gil  MRN: [id number]  Admission Date: 12/28/2017  Hospital Length of Stay: 1 days  Code Status: Full Code  Attending Physician: Derrek Walker MD   Primary Care Provider: [facility]   Principal Problem: Suicide attempt by multiple drug overdose    Subjective:     HPI:  Val Gil is a 38 y/o woman who presents after a suicide attempt and subsequent drug overdose. Pt has an opioid addiction and thought she could cure herself by using methamphetamine.  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 She has taken a lot of pills however we are unsure as to all of what she has taken. EMS did not collect any bottles or pills. Per her mother, she does take Ambien. At the scene a bag of notes were found that said "I have put some toxic substance on blanks and sheets. Do not resuscitate me. This is my time." In these notes, speed and opiates were mentioned. Pt also stuffed notes into her clothing that said "do not resuscitate me." Her father did notice some vomiting earlier today. When her mother arrived at the scene, pt had slurred speech and was subjectively unresponsive. Pt is now being decontaminated.  Critical care was consulted after patient could not be aroused after initial evaluation in the ED. She was given one dose of Narcan, after which she began to awake. She still had slurred speech and would not engage in conversation or follow commands. Will call family to obtain more collateral.     Hospital/ICU Course:  No notes on file     Past Medical History:   Diagnosis Date    Angiosarcoma     Angiosarcoma     Anxiety     Bone cancer     Depression        Past Surgical History:   Procedure Laterality Date    LEG AMPUTATION AT HIP Left 7/2015       Review of patient's allergies indicates:   Allergen Reactions    Phenergan [promethazine] Anxiety     Pt reports that she " can take po phenergan without any reaction.        Family History     Problem Relation (Age of Onset)    Arthritis Maternal Grandmother, Maternal Grandfather    Cancer Paternal Uncle, Maternal Grandmother    Diabetes Paternal Grandmother    Heart attack Maternal Grandmother, Maternal Grandfather, Paternal Grandfather    Hypertension Paternal Grandfather    No Known Problems Father        Social History Main Topics    Smoking status: Former Smoker    Smokeless tobacco: Never Used    Alcohol use No    Drug use: No    Sexual activity: Not on file      Review of Systems   Unable to perform ROS: Mental status change     Objective:     Vital Signs (Most Recent):  Temp: 98.9 °F (37.2 °C) (12/28/17 1843)  Pulse: 75 (12/29/17 0032)  Resp: 14 (12/28/17 1843)  BP: 113/74 (12/29/17 0032)  SpO2: 99 % (12/29/17 0032) Vital Signs (24h Range):  Temp:  [98.9 °F (37.2 °C)] 98.9 °F (37.2 °C)  Pulse:  [] 75  Resp:  [14] 14  SpO2:  [97 %-99 %] 99 %  BP: (108-169)/(71-98) 113/74   Weight: 63.5 kg (140 lb)  Body mass index is 24.03 kg/m².      Intake/Output Summary (Last 24 hours) at 12/29/17 0126  Last data filed at 12/29/17 0010   Gross per 24 hour   Intake             1000 ml   Output                0 ml   Net             1000 ml       Physical Exam   Constitutional: She appears well-developed and well-nourished.   HENT:   Head: Normocephalic.   Mouth/Throat: No oropharyngeal exudate.   Eyes: No scleral icterus.   Pupils were initially constricted, then became dilated after administration of narcan   Neck: Normal range of motion. No thyromegaly present.   Cardiovascular: Normal rate, regular rhythm, normal heart sounds and intact distal pulses.  Exam reveals no gallop and no friction rub.    No murmur heard.  Pulmonary/Chest: Effort normal and breath sounds normal. No respiratory distress. She has no wheezes. She exhibits no tenderness.   Abdominal: Soft. She exhibits no distension. There is no tenderness.   Musculoskeletal:    Patient with right AKA   Neurological:   GCS of 9, tolerating nasal trumpet currently    Skin: She is not diaphoretic.       Vents:     Lines/Drains/Airways     Peripheral Intravenous Line                 Peripheral IV - Single Lumen 12/28/17 2029 Right Antecubital less than 1 day         Peripheral IV - Single Lumen 12/29/17 0042 Left Hand less than 1 day              Significant Labs:    CBC/Anemia Profile:    Recent Labs  Lab 12/28/17 2023   WBC 17.85*   HGB 14.6   HCT 42.7      MCV 87   RDW 11.9        Chemistries:    Recent Labs  Lab 12/28/17 2023      K 3.7      CO2 26   BUN 11   CREATININE 0.9   CALCIUM 9.7   ALBUMIN 3.9   PROT 7.9   BILITOT 0.6   ALKPHOS 125   ALT 32   AST 49*     Assessment/Plan:     Psychiatric   * Suicide attempt by multiple drug overdose    -Patient ingested unknown amount as well as identity of multiple substances  -UDS positive for benzos and opiates  -Will contact family for more information  -Patient currently PEC'd  -Psychiatry consulted  -Patient's home meds include amphetamine salt, clonidine, flexaril, cyproheptadine, valium, fentanyl, lamictal, ativan, remeron, zofran, oxycodone, lyrica, seroquil, and zoloft  -Salicylate level <5.0  -Acetaminophen level 21->33  -Patient started on NAC, will continue to trend acetominophen levels q 12 hours  -Liver enzymes not elevated  -Will obtain lamotrigine and TCA level   -Spoke to patient's father who is unfamiliar with patient's medications, however he gave us mother's number. When we tried to contact mother, number was disconnected. Also attempted to contact patient's cell phone, but this number was also disconnected   -EKG showed sinus tachycardia,   -Review of  shows regular prescriptions for oxycodone 20 mg dispense 360 tablets q 30 days, valium 10 mg dispense 30 tablets q 30 days, lorazepam 1 mg dispense 90 tablets q 30 days, fentanyl patches as well. This goes as far back as 1 calendar year.         Anxiety    -Patient known to take benzodiazepenes for anxiety  -Will ensure seizure precautions as well as frequent neuro checks  -Will order ativan prn for withdrawals         Opioid dependence with intoxication    -Patient presented in obtunded state with pinpoint pupils, GCS <8  -Upon administration of Narcan, patient began to wake up, GCS of 9, currently with nasal trumpet tolerating well  -Will continue to administer Narcan prn   -Will continue to monitor patient's neurological status for any decline         Oncology   Leucocytosis    -Patient with WBC of 17 with left shift  -Patient remains afebrile  -Blood cultures drawn  -Urinalysis unremarkable, chest X-ray showed no acute cardiopulm process  -Will continue to monitor         Orthopedic   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     C: SAT & SBT Coordinated?  N/A                      D: Delirium: CAM-ICU     E: Early Mobility Performed? Yes   F: Feeding Goal:    Status:     Current Diet Order   Procedures    Diet NPO      AS: Analgesia/Sedation None   T: Thromboembolic Prophylaxis Lovenox   H: HOB > 300 Yes   U: Stress Ulcer Prophylaxis (if needed) None   G: Glucose Control None   B: Bowel Function     I: Indwelling Catheter (Lines & [place]) Necessity None   D: De-escalation of Antimicrobials/Pharmacotherapies None    Plan for the day/ETD Continue care in the MICU    Code Status:  Family/Goals of Care: Full Code         Critical secondary to Patient has a condition that poses threat to life and bodily function: intentional overdose of multiple substances      Critical care was time spent personally by me on the following activities: development of treatment plan  with patient or surrogate and bedside caregivers, discussions with consultants, evaluation of patient's response to treatment, examination of patient, ordering and performing treatments and interventions, ordering and review of laboratory studies, ordering and review of radiographic studies, pulse oximetry, re-evaluation of patient's condition. This critical care time did not overlap with that of any other provider or involve time for any procedures.     Josue Camacho MD  Critical Care Medicine  [facility]

## 2017-12-29 NOTE — ED NOTES
No signs of distress noted. Patient is in paper gown. Patient rights signed and on the chart. All cords and wires are out of the patients room. Patient belongings are removed from the room labeled and locked away. P.E.C is completed and on the chart. Patient sitter is at the bedside recording Q 15 minute checks. Sitter belongings are out of the room. Will continue to monitor the patient.

## 2017-12-29 NOTE — ED PROVIDER NOTES
Encounter Date: 12/28/2017    SCRIBE #1 NOTE: I, Haydee Shepherd, am scribing for, and in the presence of,  Dr. Walker. I have scribed the following portions of the note - the Resident attestation and the EKG reading.       History     Chief Complaint   Patient presents with    Drug Overdose     over dose on unknown opiate. Pt had suicide note     HPI     37 yr old CF who presents to the ED, after she was found in bed, with a suicide note stating that she wanted to cure herself of her heroin addiction, by killing herself. The note stated that she put a toxic substance on her self, after taking heroin, and possibly taking meth. The note stated that she used the substance so that no one would revive her. When she was found she was brought into the ED.     Review of patient's allergies indicates:   Allergen Reactions    Phenergan [promethazine] Anxiety     Pt reports that she can take po phenergan without any reaction.      Past Medical History:   Diagnosis Date    Angiosarcoma     Angiosarcoma     Anxiety     Bone cancer     Depression      Past Surgical History:   Procedure Laterality Date    LEG AMPUTATION AT HIP Left 7/2015     Family History   Problem Relation Age of Onset    Cancer Paternal Uncle      lung    Cancer Maternal Grandmother      breast    Heart attack Maternal Grandmother     Arthritis Maternal Grandmother     Heart attack Maternal Grandfather     Arthritis Maternal Grandfather     Diabetes Paternal Grandmother     Hypertension Paternal Grandfather     Heart attack Paternal Grandfather     No Known Problems Father      Social History   Substance Use Topics    Smoking status: Former Smoker    Smokeless tobacco: Never Used    Alcohol use No     Review of Systems   Unable to perform ROS: Acuity of condition       Physical Exam     Initial Vitals [12/28/17 1843]   BP Pulse Resp Temp SpO2   136/80 100 14 98.9 °F (37.2 °C) 99 %      MAP       98.67         Physical Exam    Nursing note  and vitals reviewed.  Constitutional: She appears well-developed and well-nourished. She appears lethargic. She is not diaphoretic. No distress.   Patient initially responded to painful stimulus only   HENT:   Head: Normocephalic and atraumatic.   Right Ear: External ear normal.   Left Ear: External ear normal.   Mouth/Throat: Oropharynx is clear and moist. No oropharyngeal exudate.   Eyes: Conjunctivae and EOM are normal. Pupils are equal, round, and reactive to light. Right eye exhibits no discharge. Left eye exhibits no discharge. No scleral icterus.   Pupils equal and reactive however sluggish   Neck: Normal range of motion. Neck supple. No JVD present.   Cardiovascular: Normal rate, regular rhythm, normal heart sounds and intact distal pulses. Exam reveals no friction rub.    No murmur heard.  Pulmonary/Chest: Breath sounds normal. No stridor. No respiratory distress. She has no wheezes. She has no rales.   Patient had snoring respiratory sounds until nasal trumpet was placed   Abdominal: Soft. She exhibits no distension. There is no tenderness. There is no rebound.   No bowel sounds were heard on arrival   Musculoskeletal: She exhibits no edema or tenderness.   Neurological: She appears lethargic. GCS eye subscore is 2. GCS verbal subscore is 2. GCS motor subscore is 5.   Again patient responding only to painful painful stimuli by trying to protect herself lifting both arms to push away staff   Skin: Capillary refill takes less than 2 seconds. No rash and no abscess noted. No erythema. No pallor.   Patient brought to bed from the decontamination unit where she was stripped and showered.  Her skin was cool to touch and her hair was still wet.         ED Course   Procedures  Labs Reviewed   COMPREHENSIVE METABOLIC PANEL   ACETAMINOPHEN LEVEL   SALICYLATE LEVEL   CBC W/ AUTO DIFFERENTIAL   URINALYSIS, REFLEX TO URINE CULTURE   DRUG SCREEN PANEL, URINE EMERGENCY   POCT URINE PREGNANCY   POCT GLUCOSE MONITORING  CONTINUOUS     EKG Readings: (Independently Interpreted)   Sinus tachycardia at a rate of 106. Normal axis, normal [place] , QRS. Prolonged QTc. No acute ischemia, no hypertrophy, no ventricular blocks.       HOIII MDM   A/P: 37 yr old with AMS, and possible ingestion. Ddx includes but is not limited to ingestion, intoxication, withdrawal, SAH, SDH, fracture, dislocation. Work up - CBC, CMP, UA, drug screen. Results significant for WBC 17.85, no anemia noted. Pending other results. Per hazmat and EMS, the pt was found in bed earlier today, upon further review of the bed, she had many pads around her possibly was having to urinate in bed, and she has a AKA, and is a cancer pt, possible reports of fentanyl patches in the bed. [facility] and did not find any chemicals. Pt was decontaminated prior to being taken into room 3. In the room she is no responding. She will open her eyes and reach for the nasal trumpet upon insertion, however otherwise is not following any commands. Pt has no signs of any seizure like activity. Will continue to monitor.   Oliverio Valle PGY-3 [employer] EM  12/28/2017 8:52 PM     Update-   Patient continues to be lethargic.  Is not following commands.  Will admit to medicine.  PEC has been placed.  Will continue to monitor.  Oliverio Valle PGY-3 [employer] EM  12/28/2017 11:40 PM         Medical Decision Making:   History:   I obtained history from: someone other than patient.       <> Summary of History: History gained from EMS and from patient's mother and from a note that was left by the patient at bedside.  Mother reports that patient was home during the day and did not know about patient states until she arrived home at 1800 hrs.  She found patient in bed  poorly responsive slurred speech that she could not make out any words - there was a note of about 6 pages in the patient's bag which indicated that she tried to kill herself and she was significantly depressed about her heroin  "addiction and the ongoing pain she had associated with her amputation in her right leg due to sarcoma.  Additionally the mother reports here that she was on Percocets but that was eventually shifted to [place].  Her mother also the patient began using heroin in the last month approximately.    Patient's note said that she was upset about her heroin addiction, that she had some "speed" that she intended to use as a treatment for her heroin addiction.  The noted initially stated that she applied some toxic substance on the day closed blankets sheets to keep staff away from her.  There was a note additionally under her shirt and said DO NOT RESUSCITATE me I want to die.  Bladder pages of her longer noted airbag stated that she asked her father to wash the sheets and blankets so that her dog would not be exposed to whatever toxin that she had spread in the room-there was certainly no further information about what this toxic substance was.    EMS reported the presence of multiple police rescue at the bedside already concerned rightly for the problem of hazardous exposures from a possible toxin at bedside.  Patient was poorly responsive as noted requiring painful stimuli to get arousal - no verbal response was detected  Old Medical Records: I decided to obtain old medical records.  Initial Assessment:   Patient with intentional overdose as suicide attempt-with history of heroin use likely still taking Norco, possible methamphetamine and benzos.  Patient will need decontamination or close we'll be stripped and taken to the shower.    Differential Diagnosis:   Depression suicidal attempt with drug overdose - multiple drugs possibility noted with opioids Tylenol in combination with hydrocodone, benzos, and methamphetamine  Independently Interpreted Test(s):   I have ordered and independently interpreted EKG Reading(s) - see prior notes  Clinical Tests:   Lab Tests: Ordered and Reviewed  Radiological Study: Ordered and " Reviewed  Medical Tests: Ordered and Reviewed  ED Management:  Patient replaced on monitor and will be started on full overdose precautions and monitoring.  Patient put in soft restraints check fingerstick glucose, basic chemistry CBC and CMP, cardiac monitor, urinalysis, urine drug screen, Tylenol level, aspirin level.  Tylenol level noted at initial draw of 20, concerned that this may be an initial rise of toxicity, at 4 hour level was 37 close to toxic level at 40.  We'll begin treatments with ICU team as patient will need close monitoring and will get  NAC infusion                    Scribe Attestation:   Scribe #1: I performed the above scribed service and the documentation accurately describes the services I performed. I attest to the accuracy of the note.    Attending Attestation:   Physician Attestation Statement for Resident:  As the supervising MD   Physician Attestation Statement: I have personally seen and examined this patient.   I agree with the above history. -:   As the supervising MD I agree with the above PE.   -:  Pupils are round and reactive to light but sluggish with a disconjugate gaze. Pink conjunctiva. MMM. There is no evidence of trauma to the skull or step-offs. No hematomas. Lungs are clear, no wheezes or prolonged expiration phase. S1, S2. No spinal step-offs. Abd is soft and non tender. No bowel sounds. Radial pulses are 2+ bilaterally. Skin is warm and dry.      As the supervising MD I agree with the above treatment, course, plan, and disposition.  I have reviewed and agree with the residents interpretation of the following: lab data, x-rays and EKG.  I have reviewed the following: old records at this facility.                    ED Course      Clinical Impression:   Drug overdose, suicide attempt, depression    Disposition:   Disposition: Admitted  Condition: Stable                        Derrek Walker MD  12/29/17 5312

## 2017-12-29 NOTE — ASSESSMENT & PLAN NOTE
-Patient with WBC of 17 with left shift  -Patient remains afebrile  -Blood cultures drawn  -Urinalysis unremarkable, chest X-ray showed no acute cardiopulm process  -Will continue to monitor

## 2017-12-29 NOTE — SUBJECTIVE & OBJECTIVE
Patient History           Medical as of 12/29/2017     Past Medical History     Diagnosis Date Comments Source    Angiosarcoma -- -- Provider    Angiosarcoma -- -- Provider    Anxiety -- -- Provider    Bone cancer -- -- Provider    Depression -- -- Provider          Pertinent Negatives     Diagnosis Date Noted Comments Source    Asthma 6/29/2015 -- Provider    COPD (chronic obstructive pulmonary disease) 6/29/2015 -- Provider    Diabetes mellitus 1/31/2015 -- Provider    Hypertension 6/29/2015 -- Provider                  Surgical as of 12/29/2017     Past Surgical History     Procedure Laterality Date Comments Source    LEG AMPUTATION AT HIP Left 7/2015 -- Provider                  Family as of 12/29/2017     Problem Relation Name Age of Onset Comments Source    Cancer Paternal Uncle great uncle -- lung Provider    Cancer Maternal Grandmother -- -- breast Provider    Heart attack Maternal Grandmother -- -- -- Provider    Arthritis Maternal Grandmother -- -- -- Provider    Heart attack Maternal Grandfather -- -- -- Provider    Arthritis Maternal Grandfather -- -- -- Provider    Diabetes Paternal Grandmother -- -- -- Provider    Hypertension Paternal Grandfather -- -- -- Provider    Heart attack Paternal Grandfather -- -- -- Provider    No Known Problems Father -- -- -- Provider            Tobacco Use as of 12/29/2017     Smoking Status Smoking Start Date Smoking Quit Date Packs/day Years Used    Former Smoker -- -- -- --    Types Comments Smokeless Tobacco Status Smokeless Tobacco Quit Date Source    -- -- Never Used -- Provider            Alcohol Use as of 12/29/2017     Alcohol Use Drinks/Week Alcohol/Week Comments Source    No -- -- -- Provider            Drug Use as of 12/29/2017     Drug Use Types Frequency Comments Source    No -- -- -- Provider            Sexual Activity as of 12/29/2017     Sexually Active Birth Control Partners Comments Source    Not Asked -- -- -- Provider            Activities of  Daily Living as of 12/29/2017    **None**           Social Documentation as of 12/29/2017    **None**           Occupational as of 12/29/2017    **None**           Socioeconomic as of 12/29/2017     Marital Status Spouse Name Number of Children Years Education Preferred Language Ethnicity Race Source    Single -- -- -- English /White White --         Pertinent History Q A Comments    as of 12/29/2017 Lives with      Place in Birth Order      Lives in      Number of Siblings      Raised by      Legal Involvement      Childhood Trauma      Criminal History of      Financial Status      Highest Level of Education      Does patient have access to a firearm?       Service      Primary Leisure Activity      Spirituality       Past Medical History:   Diagnosis Date    Angiosarcoma     Angiosarcoma     Anxiety     Bone cancer     Depression      Past Surgical History:   Procedure Laterality Date    LEG AMPUTATION AT HIP Left 7/2015     Family History     Problem Relation (Age of Onset)    Arthritis Maternal Grandmother, Maternal Grandfather    Cancer Paternal Uncle, Maternal Grandmother    Diabetes Paternal Grandmother    Heart attack Maternal Grandmother, Maternal Grandfather, Paternal Grandfather    Hypertension Paternal Grandfather    No Known Problems Father        Social History Main Topics    Smoking status: Former Smoker    Smokeless tobacco: Never Used    Alcohol use No    Drug use: No    Sexual activity: Not on file     Review of patient's allergies indicates:   Allergen Reactions    Phenergan [promethazine] Anxiety     Pt reports that she can take po phenergan without any reaction.        No current facility-administered medications on file prior to encounter.      Current Outpatient Prescriptions on File Prior to Encounter   Medication Sig    AMPHETAMINE SALT COMBO 15 MG tablet 30 mg.     cloNIDine (CATAPRES) 0.1 MG tablet Take 1 tablet (0.1 mg total) by mouth 3 (three) times daily  "as needed (withdrawal).    cyclobenzaprine (FLEXERIL) 10 MG tablet     cyproheptadine (PERIACTIN) 4 mg tablet     diazepam (VALIUM) 5 MG tablet 10 mg.     fentanyl (DURAGESIC) 12 mcg/hr PT72     fentaNYL (DURAGESIC) 25 mcg/hr     lamotrigine (LAMICTAL) 25 MG tablet Take 2 tablets (50 mg total) by mouth once daily.    lorazepam (ATIVAN) 0.5 MG tablet Take 0.5 mg by mouth 2 (two) times daily.     lorazepam (ATIVAN) 1 MG tablet     mirtazapine (REMERON) 45 MG tablet Take 1 tablet (45 mg total) by mouth every evening.    ondansetron (ZOFRAN) 8 MG tablet Take 1 tablet (8 mg total) by mouth every 6 (six) hours as needed.    oxycodone (ROXICODONE) 10 mg Tab immediate release tablet Take 1-2 tablets (10-20 mg total) by mouth every 6 (six) hours as needed for Pain.    oxycodone (ROXICODONE) 5 MG immediate release tablet Take 2 tablets (10 mg total) by mouth every 6 (six) hours as needed for Pain.    pregabalin (LYRICA) 50 MG capsule Take 1 capsule (50 mg total) by mouth once daily.    promethazine (PHENERGAN) 25 MG tablet     quetiapine (SEROQUEL) 100 MG Tab Take 1 tablet (100 mg total) by mouth every evening.    senna-docusate 8.6-50 mg (PERICOLACE) 8.6-50 mg per tablet Take 2 tablets by mouth 2 (two) times daily.    sertraline (ZOLOFT) 50 MG tablet     sucralfate (CARAFATE) 1 gram tablet Take 1 g by mouth 4 (four) times daily.     Psychotherapeutics     Start     Stop Route Frequency Ordered    12/29/17 0149  lorazepam injection 2 mg      -- IV Every 5 min PRN 12/29/17 0156        Review of Systems    Objective:     Vital Signs (Most Recent):  Temp: 100 °F (37.8 °C) (12/29/17 0820)  Pulse: 76 (12/29/17 1101)  Resp: 18 (12/29/17 1041)  BP: (!) 155/98 (12/29/17 1101)  SpO2: 100 % (12/29/17 1101) Vital Signs (24h Range):  Temp:  [98.9 °F (37.2 °C)-100 °F (37.8 °C)] 100 °F (37.8 °C)  Pulse:  [] 76  Resp:  [14-20] 18  SpO2:  [96 %-100 %] 100 %  BP: (108-177)/(70-98) 155/98     Height: 5' 4" (162.6 " cm)  Weight: 63.5 kg (140 lb)  Body mass index is 24.03 kg/m².      Intake/Output Summary (Last 24 hours) at 12/29/17 1136  Last data filed at 12/29/17 0933   Gross per 24 hour   Intake             1000 ml   Output              950 ml   Net               50 ml       Physical Exam    MSE  General Appearance: lying in bed, R AKA, nasal trumpet in place  Level of Consciousness: obtunded  Orientation: unable to assess  Grooming: fair  Psychomotor Behavior: unable to assess  Speech: unable to assess  Language: unable to assess  Mood: unable to assess  Affect: unable to assess  Thought Process:unable to assess  Associations: unable to assess  Thought Content: unable to assess  Memory: unable to assess  Attention: unable to assess  Fund of Knowledge: unable to assess  Insight: unable to assess  Judgment: unable to assess    Significant Labs:   Last 24 Hours:   Recent Lab Results       12/29/17  0348 12/29/17  0341 12/29/17  0143 12/29/17  0015 12/28/17  2053      Benzodiazepines     Presumptive Positive     Methadone metabolites     Negative     Phencyclidine     Negative     Immature Granulocytes 0.7(H)         Immature Grans (Abs) 0.17(H)         Acetaminophen (Tylenol), Serum 19.0  Comment:  Toxic Levels:  Adults (4 hr post-ingestion).........>150 ug/mL  Adults (12 hr post-ingestion)........>40 ug/mL  Peds (2 hr post-ingestion, liquid)...>225 ug/mL     33.0  Comment:  Toxic Levels:  Adults (4 hr post-ingestion).........>150 ug/mL  Adults (12 hr post-ingestion)........>40 ug/mL  Peds (2 hr post-ingestion, liquid)...>225 ug/mL  (H)      Albumin 3.1(L)         Alcohol, Medical, Serum          Alkaline Phosphatase 102         Allens Test  Pass        ALT 48(H)         Amphetamine Screen, Ur     Negative     Anion Gap 9         Aniso Slight         Appearance, UA     Clear     AST 51(H)         Barbiturate Screen, Ur     Negative     Baso # 0.05         Basophil% 0.2         Bilirubin (UA)     Negative     Total Bilirubin  0.4  Comment:  For infants and newborns, interpretation of results should be based  on gestational age, weight and in agreement with clinical  observations.  Premature Infant recommended reference ranges:  Up to 24 hours.............<8.0 mg/dL  Up to 48 hours............<12.0 mg/dL  3-5 days..................<15.0 mg/dL  6-29 days.................<15.0 mg/dL           Blood Culture, Routine    No Growth to date[P]          No Growth to date[P]      Site  RR        BUN, Bld 11         Calcium 8.9         Chloride 108         CO2 23         Cocaine (Metab.)     Negative     Color, UA     Straw     Creatinine 0.8         Creatinine, Random Ur     28.0  Comment:  The random urine reference ranges provided were established   for 24 hour urine collections.  No reference ranges exist for  random urine specimens.  Correlate clinically.       [facility]  Room Air        Differential Method Automated         eGFR if  >60.0         eGFR if non  >60.0  Comment:  Calculation used to obtain the estimated glomerular filtration  rate (eGFR) is the CKD-EPI equation.            Eos # 0.0         Eosinophil% 0.0         Free T4    1.01      Glucose 120(H)         Glucose, UA     Negative     Gran # 22.8(H)         Gran% 92.6(H)         Hematocrit 36.5(L)         Hemoglobin 12.8         Hypo Occasional         Coumadin Monitoring INR 1.3  Comment:  Coumadin Therapy:  2.0 - 3.0 for INR for all indicators except mechanical heart valves  and antiphospholipid syndromes which should use 2.5 - 3.5.  (H)  1.3  Comment:  Coumadin Therapy:  2.0 - 3.0 for INR for all indicators except mechanical heart valves  and antiphospholipid syndromes which should use 2.5 - 3.5.  (H)       Ketones, UA     Negative     Leukocytes, UA     Negative     Lymph # 1.1         Lymph% 4.3(L)         Magnesium 1.9         MCH 30.2         MCHC 35.1         MCV 86         Mode  SPONT        Mono # 0.5         Mono% 2.2(L)         MPV 9.6          Nitrite, UA     Negative     nRBC 0         Occult Blood UA     Negative     Opiate Scrn, Ur     Presumptive Positive     Ovalocytes Occasional         pH, UA     7.0     Phosphorus 1.2(L)         Platelets 292         POC BE  4        POC HCO3  27.5        POC PCO2  37.1        POC PH  7.477(H)        POC PO2  87        POC SATURATED O2  97        POC TCO2  29(H)        POCT Glucose          Poik Slight         Poly Occasional         Potassium 4.1         Preg Test, Ur     Negative     Total Protein 6.8         Protein, UA     Negative  Comment:  Recommend a 24 hour urine protein or a urine   protein/creatinine ratio if globulin induced proteinuria is  clinically suspected.       Protime 13.5(H)  13.1(H)        Acceptable     Yes     RBC 4.24         RDW 12.0         Salicylate Lvl          Sample  ARTERIAL        Sodium 140         Sp02  99        Specific Bonnie, UA     1.005     Specimen UA     Urine, Catheterized     TCA Scrn          Marijuana (THC) Metabolite     Negative     Toxicology Information     SEE COMMENT  Comment:  This screen includes the following classes of drugs at the   listed cut-off:  Benzodiazepines                  200 ng/ml  Methadone                        300 ng/ml  Cocaine metabolite               300 ng/ml  Opiates                          300 ng/ml  Barbiturates                     200 ng/ml  Amphetamines                    1000 ng/ml  Marijuana metabs (THC)            50 ng/ml  Phencyclidine (PCP)               25 ng/ml  High concentrations of Diphenhydramine may cross-react with  Phencyclidine PCP screening immunoassay giving a false   positive result.  High concentrations of Methylenedioxymethamphetamine (MDMA aka  Ectasy) and other structurally similar compounds may cross-   react with the Amphetamine/Methamphetamine screening   immunoassay giving a false positive result.  A metabolite of the anti-HIV drug Sustiva () may cause  false positive results  in the Marijuana metabolite (THC)   screening assay.  Note: This exception list includes only more common   interferants in toxicology screen testing.  Because of many   cross-reactantspositive results on toxicology drug screens   should be confirmed whenever results do not correlate with   clinical presentation.  This report is intended for use in clinical monitoring and  management of patients. It is not intended for use in   employment related drug testing.  Because of any cross-reactants, positive results on toxicology  drug screens should be confirmed whenever results do not  correlate with clinical presentation.  Presumptive positive results are unconfirmed and may be used   only for medical purposes.       TSH    0.100(L)      Urobilinogen, UA     Negative     WBC 24.64(H)                     12/28/17 2023 12/28/17 2003      Benzodiazepines       Methadone metabolites       Phencyclidine       Immature Granulocytes 0.3      Immature Grans (Abs) 0.06(H)      Acetaminophen (Tylenol), Serum 21.0  Comment:  Toxic Levels:  Adults (4 hr post-ingestion).........>150 ug/mL  Adults (12 hr post-ingestion)........>40 ug/mL  Peds (2 hr post-ingestion, liquid)...>225 ug/mL  (H)      Albumin 3.9      Alcohol, Medical, Serum <10      Alkaline Phosphatase 125      Allens Test       ALT 32      Amphetamine Screen, Ur       Anion Gap 10      Aniso       Appearance, UA       AST 49  Comment:  *Result may be interfered by visible hemolysis(H)      Barbiturate Screen, Ur       Baso # 0.03      Basophil% 0.2      Bilirubin (UA)       Total Bilirubin 0.6  Comment:  For infants and newborns, interpretation of results should be based  on gestational age, weight and in agreement with clinical  observations.  Premature Infant recommended reference ranges:  Up to 24 hours.............<8.0 mg/dL  Up to 48 hours............<12.0 mg/dL  3-5 days..................<15.0 mg/dL  6-29 days.................<15.0 mg/dL        Blood Culture,  Routine       Site       BUN, Bld 11      Calcium 9.7      Chloride 101      CO2 26      Cocaine (Metab.)       Color, UA       Creatinine 0.9      Creatinine, Random Ur       DelSys       Differential Method Automated      eGFR if African American >60.0      eGFR if non  >60.0  Comment:  Calculation used to obtain the estimated glomerular filtration  rate (eGFR) is the CKD-EPI equation.         Eos # 0.0      Eosinophil% 0.0      Free T4       Glucose 103      Glucose, UA       Gran # 17.0(H)      Gran% 95.4(H)      Hematocrit 42.7      Hemoglobin 14.6      Hypo       Coumadin Monitoring INR       Ketones, UA       Leukocytes, UA       Lymph # 0.4(L)      Lymph% 2.3(L)      Magnesium       MCH 29.9      MCHC 34.2      MCV 87      Mode       Mono # 0.3      Mono% 1.8(L)      MPV 10.2      Nitrite, UA       nRBC 0      Occult Blood UA       Opiate Scrn, Ur       Ovalocytes       pH, UA       Phosphorus       Platelets 315      POC BE       POC HCO3       POC PCO2       POC PH       POC PO2       POC SATURATED O2       POC TCO2       POCT Glucose  83     Poik       Poly       Potassium 3.7      Preg Test, Ur       Total Protein 7.9      Protein, UA       Protime        Acceptable       RBC 4.89      RDW 11.9      Salicylate Lvl <5.0  Comment:  Toxic:  30.0 - 70.0 mg/dl  Lethal: >70.0 mg/dl  (L)      Sample       Sodium 137      Sp02       Specific Gravity, UA       Specimen UA       TCA Scrn >500.0  Comment:  50.0-100.0 ng/mL have demonstrated cardiac effects. > 500.0 ng/mL --   a   significant increase in the incidence of serious cardiac toxicity.          Marijuana (THC) Metabolite       Toxicology Information       TSH       Urobilinogen, UA       WBC 17.85(H)            Significant Imaging: I have reviewed all pertinent imaging results/findings within the past 24 hours.

## 2017-12-29 NOTE — HPI
"Val Gil is a 36 y/o woman who presents after a suicide attempt and subsequent drug overdose. Pt has an opioid addiction and thought she could cure herself by using methamphetamine.  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 She has taken a lot of pills however we are unsure as to all of what she has taken. EMS did not collect any bottles or pills. Per her mother, she does take Ambien. At the scene a bag of notes were found that said "I have put some toxic substance on blanks and sheets. Do not resuscitate me. This is my time." In these notes, speed and opiates were mentioned. Pt also stuffed notes into her clothing that said "do not resuscitate me." Her father did notice some vomiting earlier today. When her mother arrived at the scene, pt had slurred speech and was subjectively unresponsive. Pt is now being decontaminated.  Critical care was consulted after patient could not be aroused after initial evaluation in the ED. She was given one dose of Narcan, after which she began to awake. She still had slurred speech and would not engage in conversation or follow commands. Will call family to obtain more collateral.   "

## 2017-12-29 NOTE — PROVIDER PROGRESS NOTES - EMERGENCY DEPT.
Encounter Date: 12/28/2017    ED Physician Progress Notes             8:30 PM    Pt is unresponsive except to obnoxious stimuli. She moved her arms to block her face from staff. We have placed a nasal trumpet.     I, Haydee Shepherd, am scribing for, and in the presence of, Dr. Walker. I performed the above scribed service and the documentation accurately describes the services I performed. I attest to the accuracy of the note.

## 2017-12-29 NOTE — HPI
"Val Gil is a 37 y.o. female with past psychiatric history of GLORIA, Depression and Adjustment d/o and PMH of Sarcoma s/p R AKA 2015 who was BIB EMS after intentional overdose in suicide attempt.    Per ED MD note:  History gained from EMS and from patient's mother and from a note that was left by the patient at bedside. Mother reports that patient was home during the day and did not know about patient states until she arrived home at 1800 hrs.  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  Additionally the mother reports here that she was on Percocets but that was eventually shifted to [place].  Her mother also the patient began using heroin in the last month approximately. Patient's note said that she was upset about her heroin addiction, that she had some "speed" that she intended to use as a treatment for her heroin addiction.  The noted initially stated that she applied some toxic substance on the day closed blankets sheets to keep staff away from her.  There was a note additionally under her shirt and said DO NOT RESUSCITATE me I want to die.  Bladder pages of her longer noted airbag stated that she asked her father to wash the sheets and blankets so that her dog would not be exposed to whatever toxin that she had spread in the room-there was certainly no further information about what this toxic substance was. EMS reported the presence of multiple police rescue at the bedside already concerned rightly for the problem of hazardous exposures from a possible toxin at bedside.  Patient was poorly responsive as noted requiring painful stimuli to get arousal - no verbal response was detected. " "    Critical care was consulted after patient could not be aroused after initial evaluation in the ED. Per their note, she was given one dose of Narcan in ED, after which she began to awake. She still had slurred speech and would not engage in conversation or follow commands. They "spoke to patient's father who is unfamiliar with patient's medications, however he gave mother's number. When we tried to contact mother, number was disconnected. Also attempted to contact patient's cell phone, but this number was also disconnected. Patient's home meds include amphetamine salt, clonidine, flexaril, cyproheptadine, valium, fentanyl, lamictal, ativan, remeron, zofran, oxycodone, lyrica, seroquil, and zoloft. Review of  shows regular prescriptions for oxycodone 20 mg dispense 360 tablets q 30 days, valium 10 mg dispense 30 tablets q 30 days, lorazepam 1 mg dispense 90 tablets q 30 days, fentanyl patches as well. This goes as far back as 1 calendar year."    On attempt to interview, patient sleeping on ED stretcher, unable to arouse with verbal stimuli or gentle leg shaking. No family present at bedside.    Per chart review, patient previously seen by psychiatry in 2015 for medication management during admission when she received AKA. She was on Lamictal, Remeron, Seroquel, Zoloft at that time; Zoloft was eventually changed to Savella. Ativan was used PRN anxiety. Patient did not require inpatient psychiatric hospitalization at that time.     Interval update 12/31/17: Pt agitated, shivering, pupils dilated, diaphoretic. Pt c/o opiate withdrawal at this time. She is anxious with irritable mood. Pt states multiple life stressors related to her cancer and AKA that have been building for past few years. Pt and her father were kicked out of their rental house ~1 month ago which was "the straw that broke the camel's back." Pt had been thinking of and planning her suicide for 1 week prior to the attempt. Pt becomes tearful many " "times during interview, and emotionally states "I didn't want to.. Life was just too much. I'm tired of living in pain." Pt may be unreliable, as she denies heroin use (despite admitting it in suicide note) and does not report accurate dose/frequency of her pain regimen. Pt takes Lamictal 75mg qd, Zoloft 100mg qd, Seroquel 100mg qhs, and Remeron 45mg qhs for her depression. Pt again becomes tearful at conclusion of interview, stating that she is "so tired and burnt out on life." Pt minimizes her suicide attempt and drug use and does not think she needs inpatient psych.    Collateral: mother Dilip Reis, [phone]. Rang once and went to [facility].    Per psychiatry consult note 7/15/15, information will be updated as able:  Past Psychiatric History:   Previous Psychiatric Hospitalizations: No   Previous Medication Trials: Yes  Previous Suicide Attempts: No   History of Violence: No   Outpatient psychiatrist: Dr. 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

## 2017-12-29 NOTE — CONSULTS
"[facility]  Psychiatry  Consult Note    Patient Name: Val Gil  MRN: [id number]   Code Status: Full Code  Admission Date: 12/28/2017  Hospital Length of Stay: 1 days  Attending Physician: Stu Galdamez*  Primary Care Provider: [facility]    Current Legal Status: PEC    Patient information was obtained from parent, EMS personnel, past medical records and ER records.   Inpatient consult to Psychiatry  Consult performed by: MATHEW CORRALES  Consult ordered by: YVONNE DAMON II        Subjective:     Principal Problem:Suicide attempt by multiple drug overdose    Chief Complaint:  Suicide attempt     HPI: Val Gil is a 37 y.o. female with past psychiatric history of GLORIA, Depression and Adjustment d/o and PMH of Sarcoma s/p R AKA 2015 who was BIB EMS after intentional overdose in suicide attempt.    Per ED MD note:  History gained from EMS and from patient's mother and from a note that was left by the patient at bedside. Mother reports that patient was home during the day and did not know about patient states until she arrived home at 1800 hrs.  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  Additionally the mother reports here that she was on Percocets but that was eventually shifted to Adrian.  Her mother also the patient began using heroin in the last month approximately. Patient's note said that she was upset about her heroin addiction, that she had some "speed" that she intended to use as a treatment for her heroin addiction.  The noted initially stated that she applied some toxic substance on the day closed blankets sheets to keep " "staff away from her.  There was a note additionally under her shirt and said DO NOT RESUSCITATE me I want to die.  Bladder pages of her longer noted taneshag stated that she asked her father to wash the sheets and blankets so that her dog would not be exposed to whatever toxin that she had spread in the room-there was certainly no further information about what this toxic substance was. EMS reported the presence of multiple police rescue at the bedside already concerned rightly for the problem of hazardous exposures from a possible toxin at bedside.  Patient was poorly responsive as noted requiring painful stimuli to get arousal - no verbal response was detected.     Critical care was consulted after patient could not be aroused after initial evaluation in the ED. Per their note, she was given one dose of Narcan in ED, after which she began to awake. She still had slurred speech and would not engage in conversation or follow commands. They "spoke to patient's father who is unfamiliar with patient's medications, however he gave mother's number. When we tried to contact mother, number was disconnected. Also attempted to contact patient's cell phone, but this number was also disconnected. Patient's home meds include amphetamine salt, clonidine, flexaril, cyproheptadine, valium, fentanyl, lamictal, ativan, remeron, zofran, oxycodone, lyrica, seroquil, and zoloft. Review of  shows regular prescriptions for oxycodone 20 mg dispense 360 tablets q 30 days, valium 10 mg dispense 30 tablets q 30 days, lorazepam 1 mg dispense 90 tablets q 30 days, fentanyl patches as well. This goes as far back as 1 calendar year."    On attempt to interview, patient sleeping on ED stretcher, unable to arouse with verbal stimuli or gentle leg shaking. No family present at bedside.    Per chart review, patient previously seen by psychiatry in 2015 for medication management during admission when she received AKA. She was on Lamictal, Remeron, " Seroquel, Zoloft at that time; Zoloft was eventually changed to Savella. Ativan was used PRN anxiety. Patient did not require inpatient psychiatric hospitalization at that time.     Collateral: mother Dilip Reis, [phone]. Rang once and went to [facility].    Per psychiatry consult note 7/15/15, information will be updated as able:  Past Psychiatric History:   Previous Psychiatric Hospitalizations: No   Previous Medication Trials: Yes  Previous Suicide Attempts: No   History of Violence: No   Outpatient psychiatrist: Dr. 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    Marital Status: Single  Children: 0   Housing Status: with grandfather and mom   History of phys/sexual abuse: No   Access to gun: No       Substance Abuse History:   Recreational Drugs:THC in past but last used 10 years ago; per chart began using heroin approx 1 mo ago and using pain medication more than prescribed   Use of Alcohol: Social use   Tobacco Use:No   Rehab History:No      Legal History:   Past Charges/Incarcerations:No   Pending charges: No      Family Psychiatric History:   Denies            Patient History           Medical as of 12/29/2017     Past Medical History     Diagnosis Date Comments Source    Angiosarcoma -- -- Provider    Angiosarcoma -- -- Provider    Anxiety -- -- Provider    Bone cancer -- -- Provider    Depression -- -- Provider          Pertinent Negatives     Diagnosis Date Noted Comments Source    Asthma 6/29/2015 -- Provider    COPD (chronic obstructive pulmonary disease) 6/29/2015 -- Provider    Diabetes mellitus 1/31/2015 -- Provider    Hypertension 6/29/2015 -- Provider                  Surgical as of 12/29/2017     Past Surgical History     Procedure Laterality Date Comments Source    LEG AMPUTATION AT HIP Left 7/2015 -- Provider                   Family as of 12/29/2017     Problem Relation Name Age of Onset Comments Source    Cancer Paternal Uncle great uncle -- lung Provider    Cancer Maternal Grandmother -- -- breast Provider    Heart attack Maternal Grandmother -- -- -- Provider    Arthritis Maternal Grandmother -- -- -- Provider    Heart attack Maternal Grandfather -- -- -- Provider    Arthritis Maternal Grandfather -- -- -- Provider    Diabetes Paternal Grandmother -- -- -- Provider    Hypertension Paternal Grandfather -- -- -- Provider    Heart attack Paternal Grandfather -- -- -- Provider    No Known Problems Father -- -- -- Provider            Tobacco Use as of 12/29/2017     Smoking Status Smoking Start Date Smoking Quit Date Packs/day Years Used    Former Smoker -- -- -- --    Types Comments Smokeless Tobacco Status Smokeless Tobacco Quit Date Source    -- -- Never Used -- Provider            Alcohol Use as of 12/29/2017     Alcohol Use Drinks/Week Alcohol/Week Comments Source    No -- -- -- Provider            Drug Use as of 12/29/2017     Drug Use Types Frequency Comments Source    No -- -- -- Provider            Sexual Activity as of 12/29/2017     Sexually Active Birth Control Partners Comments Source    Not Asked -- -- -- Provider            Activities of Daily Living as of 12/29/2017    **None**           Social Documentation as of 12/29/2017    **None**           Occupational as of 12/29/2017    **None**           Socioeconomic as of 12/29/2017     Marital Status Spouse Name Number of Children Years Education Preferred Language Ethnicity Race Source    Single -- -- -- English /White White --         Pertinent History Q A Comments    as of 12/29/2017 Lives with      Place in Birth Order      Lives in      Number of Siblings      Raised by      Legal Involvement      Childhood Trauma      Criminal History of      Financial Status      Highest Level of Education      Does patient have access to a firearm?       Service       Primary Leisure Activity      Spirituality       Past Medical History:   Diagnosis Date    Angiosarcoma     Angiosarcoma     Anxiety     Bone cancer     Depression      Past Surgical History:   Procedure Laterality Date    LEG AMPUTATION AT HIP Left 7/2015     Family History     Problem Relation (Age of Onset)    Arthritis Maternal Grandmother, Maternal Grandfather    Cancer Paternal Uncle, Maternal Grandmother    Diabetes Paternal Grandmother    Heart attack Maternal Grandmother, Maternal Grandfather, Paternal Grandfather    Hypertension Paternal Grandfather    No Known Problems Father        Social History Main Topics    Smoking status: Former Smoker    Smokeless tobacco: Never Used    Alcohol use No    Drug use: No    Sexual activity: Not on file     Review of patient's allergies indicates:   Allergen Reactions    Phenergan [promethazine] Anxiety     Pt reports that she can take po phenergan without any reaction.        No current facility-administered medications on file prior to encounter.      Current Outpatient Prescriptions on File Prior to Encounter   Medication Sig    AMPHETAMINE SALT COMBO 15 MG tablet 30 mg.     cloNIDine (CATAPRES) 0.1 MG tablet Take 1 tablet (0.1 mg total) by mouth 3 (three) times daily as needed (withdrawal).    cyclobenzaprine (FLEXERIL) 10 MG tablet     cyproheptadine (PERIACTIN) 4 mg tablet     diazepam (VALIUM) 5 MG tablet 10 mg.     fentanyl (DURAGESIC) 12 mcg/hr PT72     fentaNYL (DURAGESIC) 25 mcg/hr     lamotrigine (LAMICTAL) 25 MG tablet Take 2 tablets (50 mg total) by mouth once daily.    lorazepam (ATIVAN) 0.5 MG tablet Take 0.5 mg by mouth 2 (two) times daily.     lorazepam (ATIVAN) 1 MG tablet     mirtazapine (REMERON) 45 MG tablet Take 1 tablet (45 mg total) by mouth every evening.    ondansetron (ZOFRAN) 8 MG tablet Take 1 tablet (8 mg total) by mouth every 6 (six) hours as needed.    oxycodone (ROXICODONE) 10 mg Tab immediate release  "tablet Take 1-2 tablets (10-20 mg total) by mouth every 6 (six) hours as needed for Pain.    oxycodone (ROXICODONE) 5 MG immediate release tablet Take 2 tablets (10 mg total) by mouth every 6 (six) hours as needed for Pain.    pregabalin (LYRICA) 50 MG capsule Take 1 capsule (50 mg total) by mouth once daily.    promethazine (PHENERGAN) 25 MG tablet     quetiapine (SEROQUEL) 100 MG Tab Take 1 tablet (100 mg total) by mouth every evening.    senna-docusate 8.6-50 mg (PERICOLACE) 8.6-50 mg per tablet Take 2 tablets by mouth 2 (two) times daily.    sertraline (ZOLOFT) 50 MG tablet     sucralfate (CARAFATE) 1 gram tablet Take 1 g by mouth 4 (four) times daily.     Psychotherapeutics     Start     Stop Route Frequency Ordered    12/29/17 0149  lorazepam injection 2 mg      -- IV Every 5 min PRN 12/29/17 0156        Review of Systems    Objective:     Vital Signs (Most Recent):  Temp: 100 °F (37.8 °C) (12/29/17 0820)  Pulse: 76 (12/29/17 1101)  Resp: 18 (12/29/17 1041)  BP: (!) 155/98 (12/29/17 1101)  SpO2: 100 % (12/29/17 1101) Vital Signs (24h Range):  Temp:  [98.9 °F (37.2 °C)-100 °F (37.8 °C)] 100 °F (37.8 °C)  Pulse:  [] 76  Resp:  [14-20] 18  SpO2:  [96 %-100 %] 100 %  BP: (108-177)/(70-98) 155/98     Height: 5' 4" (162.6 cm)  Weight: 63.5 kg (140 lb)  Body mass index is 24.03 kg/m².      Intake/Output Summary (Last 24 hours) at 12/29/17 1136  Last data filed at 12/29/17 0933   Gross per 24 hour   Intake             1000 ml   Output              950 ml   Net               50 ml       Physical Exam    MSE  General Appearance: lying in bed, R AKA, nasal trumpet in place  Level of Consciousness: obtunded  Orientation: unable to assess  Grooming: fair  Psychomotor Behavior: unable to assess  Speech: unable to assess  Language: unable to assess  Mood: unable to assess  Affect: unable to assess  Thought Process:unable to assess  Associations: unable to assess  Thought Content: unable to assess  Memory: unable " to assess  Attention: unable to assess  Fund of Knowledge: unable to assess  Insight: unable to assess  Judgment: unable to assess    Significant Labs:   Last 24 Hours:   Recent Lab Results       12/29/17  0348 12/29/17  0341 12/29/17  0143 12/29/17  0015 12/28/17 2053      Benzodiazepines     Presumptive Positive     Methadone metabolites     Negative     Phencyclidine     Negative     Immature Granulocytes 0.7(H)         Immature Grans (Abs) 0.17(H)         Acetaminophen (Tylenol), Serum 19.0  Comment:  Toxic Levels:  Adults (4 hr post-ingestion).........>150 ug/mL  Adults (12 hr post-ingestion)........>40 ug/mL  Peds (2 hr post-ingestion, liquid)...>225 ug/mL     33.0  Comment:  Toxic Levels:  Adults (4 hr post-ingestion).........>150 ug/mL  Adults (12 hr post-ingestion)........>40 ug/mL  Peds (2 hr post-ingestion, liquid)...>225 ug/mL  (H)      Albumin 3.1(L)         Alcohol, Medical, Serum          Alkaline Phosphatase 102         Allens Test  Pass        ALT 48(H)         Amphetamine Screen, Ur     Negative     Anion Gap 9         Aniso Slight         Appearance, UA     Clear     AST 51(H)         Barbiturate Screen, Ur     Negative     Baso # 0.05         Basophil% 0.2         Bilirubin (UA)     Negative     Total Bilirubin 0.4  Comment:  For infants and newborns, interpretation of results should be based  on gestational age, weight and in agreement with clinical  observations.  Premature Infant recommended reference ranges:  Up to 24 hours.............<8.0 mg/dL  Up to 48 hours............<12.0 mg/dL  3-5 days..................<15.0 mg/dL  6-29 days.................<15.0 mg/dL           Blood Culture, Routine    No Growth to date[P]          No Growth to date[P]      Site  RR        BUN, Bld 11         Calcium 8.9         Chloride 108         CO2 23         Cocaine (Metab.)     Negative     Color, UA     Straw     Creatinine 0.8         Creatinine, Random Ur     28.0  Comment:  The random urine reference  ranges provided were established   for 24 hour urine collections.  No reference ranges exist for  random urine specimens.  Correlate clinically.       [facility]  Room Air        Differential Method Automated         eGFR if  >60.0         eGFR if non  >60.0  Comment:  Calculation used to obtain the estimated glomerular filtration  rate (eGFR) is the CKD-EPI equation.            Eos # 0.0         Eosinophil% 0.0         Free T4    1.01      Glucose 120(H)         Glucose, UA     Negative     Gran # 22.8(H)         Gran% 92.6(H)         Hematocrit 36.5(L)         Hemoglobin 12.8         Hypo Occasional         Coumadin Monitoring INR 1.3  Comment:  Coumadin Therapy:  2.0 - 3.0 for INR for all indicators except mechanical heart valves  and antiphospholipid syndromes which should use 2.5 - 3.5.  (H)  1.3  Comment:  Coumadin Therapy:  2.0 - 3.0 for INR for all indicators except mechanical heart valves  and antiphospholipid syndromes which should use 2.5 - 3.5.  (H)       Ketones, UA     Negative     Leukocytes, UA     Negative     Lymph # 1.1         Lymph% 4.3(L)         Magnesium 1.9         MCH 30.2         MCHC 35.1         MCV 86         Mode  SPONT        Mono # 0.5         Mono% 2.2(L)         MPV 9.6         Nitrite, UA     Negative     nRBC 0         Occult Blood UA     Negative     Opiate Scrn, Ur     Presumptive Positive     Ovalocytes Occasional         pH, UA     7.0     Phosphorus 1.2(L)         Platelets 292         POC BE  4        POC HCO3  27.5        POC PCO2  37.1        POC PH  7.477(H)        POC PO2  87        POC SATURATED O2  97        POC TCO2  29(H)        POCT Glucose          Poik Slight         Poly Occasional         Potassium 4.1         Preg Test, Ur     Negative     Total Protein 6.8         Protein, UA     Negative  Comment:  Recommend a 24 hour urine protein or a urine   protein/creatinine ratio if globulin induced proteinuria is  clinically suspected.        Protime 13.5(H)  13.1(H)        Acceptable     Yes     RBC 4.24         RDW 12.0         Salicylate Lvl          Sample  ARTERIAL        Sodium 140         Sp02  99        Specific [employer], UA     1.005     Specimen UA     Urine, Catheterized     TCA Scrn          Marijuana (THC) Metabolite     Negative     Toxicology Information     SEE COMMENT  Comment:  This screen includes the following classes of drugs at the   listed cut-off:  Benzodiazepines                  200 ng/ml  Methadone                        300 ng/ml  Cocaine metabolite               300 ng/ml  Opiates                          300 ng/ml  Barbiturates                     200 ng/ml  Amphetamines                    1000 ng/ml  Marijuana metabs (THC)            50 ng/ml  Phencyclidine (PCP)               25 ng/ml  High concentrations of Diphenhydramine may cross-react with  Phencyclidine PCP screening immunoassay giving a false   positive result.  High concentrations of Methylenedioxymethamphetamine (MDMA aka  Ectasy) and other structurally similar compounds may cross-   react with the Amphetamine/Methamphetamine screening   immunoassay giving a false positive result.  A metabolite of the anti-HIV drug Sustiva () may cause  false positive results in the Marijuana metabolite (THC)   screening assay.  Note: This exception list includes only more common   interferants in toxicology screen testing.  Because of many   cross-reactantspositive results on toxicology drug screens   should be confirmed whenever results do not correlate with   clinical presentation.  This report is intended for use in clinical monitoring and  management of patients. It is not intended for use in   employment related drug testing.  Because of any cross-reactants, positive results on toxicology  drug screens should be confirmed whenever results do not  correlate with clinical presentation.  Presumptive positive results are unconfirmed and may be used   only  for medical purposes.       TSH    0.100(L)      Urobilinogen, UA     Negative     WBC 24.64(H)                     12/28/17 2023 12/28/17 2003      Benzodiazepines       Methadone metabolites       Phencyclidine       Immature Granulocytes 0.3      Immature Grans (Abs) 0.06(H)      Acetaminophen (Tylenol), Serum 21.0  Comment:  Toxic Levels:  Adults (4 hr post-ingestion).........>150 ug/mL  Adults (12 hr post-ingestion)........>40 ug/mL  Peds (2 hr post-ingestion, liquid)...>225 ug/mL  (H)      Albumin 3.9      Alcohol, Medical, Serum <10      Alkaline Phosphatase 125      Allens Test       ALT 32      Amphetamine Screen, Ur       Anion Gap 10      Aniso       Appearance, UA       AST 49  Comment:  *Result may be interfered by visible hemolysis(H)      Barbiturate Screen, Ur       Baso # 0.03      Basophil% 0.2      Bilirubin (UA)       Total Bilirubin 0.6  Comment:  For infants and newborns, interpretation of results should be based  on gestational age, weight and in agreement with clinical  observations.  Premature Infant recommended reference ranges:  Up to 24 hours.............<8.0 mg/dL  Up to 48 hours............<12.0 mg/dL  3-5 days..................<15.0 mg/dL  6-29 days.................<15.0 mg/dL        Blood Culture, Routine       Site       BUN, Bld 11      Calcium 9.7      Chloride 101      CO2 26      Cocaine (Metab.)       Color, UA       Creatinine 0.9      Creatinine, Random Ur       DelSys       Differential Method Automated      eGFR if African American >60.0      eGFR if non  >60.0  Comment:  Calculation used to obtain the estimated glomerular filtration  rate (eGFR) is the CKD-EPI equation.         Eos # 0.0      Eosinophil% 0.0      Free T4       Glucose 103      Glucose, UA       Gran # 17.0(H)      Gran% 95.4(H)      Hematocrit 42.7      Hemoglobin 14.6      Hypo       Coumadin Monitoring INR       Ketones, UA       Leukocytes, UA       Lymph # 0.4(L)      Lymph% 2.3(L)       Magnesium       MCH 29.9      MCHC 34.2      MCV 87      Mode       Mono # 0.3      Mono% 1.8(L)      MPV 10.2      Nitrite, UA       nRBC 0      Occult Blood UA       Opiate Scrn, Ur       Ovalocytes       pH, UA       Phosphorus       Platelets 315      POC BE       POC HCO3       POC PCO2       POC PH       POC PO2       POC SATURATED O2       POC TCO2       POCT Glucose  83     Poik       Poly       Potassium 3.7      Preg Test, Ur       Total Protein 7.9      Protein, UA       Protime        Acceptable       RBC 4.89      RDW 11.9      Salicylate Lvl <5.0  Comment:  Toxic:  30.0 - 70.0 mg/dl  Lethal: >70.0 mg/dl  (L)      Sample       Sodium 137      Sp02       Specific Gravity, UA       Specimen UA       TCA Scrn >500.0  Comment:  50.0-100.0 ng/mL have demonstrated cardiac effects. > 500.0 ng/mL --   a   significant increase in the incidence of serious cardiac toxicity.          Marijuana (THC) Metabolite       Toxicology Information       TSH       Urobilinogen, UA       WBC 17.85(H)            Significant Imaging: I have reviewed all pertinent imaging results/findings within the past 24 hours.    Assessment/Plan:     * Suicide attempt by multiple drug overdose    - Agree with PEC in setting of deliberate suicide attempt, will seek inpatient placement once medically cleared, currently being admitted from ED to ICU. At this time concern for patient developing possible serotonin syndrome, NMS, tylenol toxicity and TCA toxicity. Overdose on Seroquel also likely given prolonged Qtc (since ED arrival 453 -> 497).  - Per her suicide note recent use of heroin and methamphetamines, UDS + opiates and benzos  - Home regimen per ICU team amphetamine salts, clonidine, flexaril, cyproheptadine, valium, fentanyl, lamictal, ativan, remeron, zofran, oxycodone, lyrica, seroquel, and zoloft. Per pharmacy upon discharge in 2015 most recently filled meds in August 2017 and was on psychotropics listed above,  last filled Lamictal May 2017, no TCA rx. [facility] lists [facility] [phone] as most recent pharmacy. Consider inpatient pharmacy consult to help clarify most recent outpatient regimen. Initial TCA level >500, lamotrigine level pending. Acetaminophen level also elevated and trending up.   - Hold all psychotropics at this time in setting of acute overdose.   - May use Ativan 1 mg IV/IM q6 hours PRN severe anxiety; would avoid using antipsychotics   - Mother was at bedside on initial presentation and provided collateral to ED team, will continue to attempt to contact her for more information  - Will continue to follow           Discussed with staff Dr Devi.  Total Time:  60 minutes      Marilyn Winslow MD   Psychiatry  [facility]

## 2017-12-29 NOTE — SUBJECTIVE & OBJECTIVE
Past Medical History:   Diagnosis Date    Angiosarcoma     Angiosarcoma     Anxiety     Bone cancer     Depression        Past Surgical History:   Procedure Laterality Date    LEG AMPUTATION AT HIP Left 7/2015       Review of patient's allergies indicates:   Allergen Reactions    Phenergan [promethazine] Anxiety     Pt reports that she can take po phenergan without any reaction.        Family History     Problem Relation (Age of Onset)    Arthritis Maternal Grandmother, Maternal Grandfather    Cancer Paternal Uncle, Maternal Grandmother    Diabetes Paternal Grandmother    Heart attack Maternal Grandmother, Maternal Grandfather, Paternal Grandfather    Hypertension Paternal Grandfather    No Known Problems Father        Social History Main Topics    Smoking status: Former Smoker    Smokeless tobacco: Never Used    Alcohol use No    Drug use: No    Sexual activity: Not on file      Review of Systems   Unable to perform ROS: Mental status change     Objective:     Vital Signs (Most Recent):  Temp: 98.9 °F (37.2 °C) (12/28/17 1843)  Pulse: 75 (12/29/17 0032)  Resp: 14 (12/28/17 1843)  BP: 113/74 (12/29/17 0032)  SpO2: 99 % (12/29/17 0032) Vital Signs (24h Range):  Temp:  [98.9 °F (37.2 °C)] 98.9 °F (37.2 °C)  Pulse:  [] 75  Resp:  [14] 14  SpO2:  [97 %-99 %] 99 %  BP: (108-169)/(71-98) 113/74   Weight: 63.5 kg (140 lb)  Body mass index is 24.03 kg/m².      Intake/Output Summary (Last 24 hours) at 12/29/17 0126  Last data filed at 12/29/17 0010   Gross per 24 hour   Intake             1000 ml   Output                0 ml   Net             1000 ml       Physical Exam   Constitutional: She appears well-developed and well-nourished.   HENT:   Head: Normocephalic.   Mouth/Throat: No oropharyngeal exudate.   Eyes: No scleral icterus.   Pupils were initially constricted, then became dilated after administration of narcan   Neck: Normal range of motion. No thyromegaly present.   Cardiovascular: Normal rate,  regular rhythm, normal heart sounds and intact distal pulses.  Exam reveals no gallop and no friction rub.    No murmur heard.  Pulmonary/Chest: Effort normal and breath sounds normal. No respiratory distress. She has no wheezes. She exhibits no tenderness.   Abdominal: Soft. She exhibits no distension. There is no tenderness.   Musculoskeletal:   Patient with right AKA   Neurological:   GCS of 9, tolerating nasal trumpet currently    Skin: She is not diaphoretic.       Vents:     Lines/Drains/Airways     Peripheral Intravenous Line                 Peripheral IV - Single Lumen 12/28/17 2029 Right Antecubital less than 1 day         Peripheral IV - Single Lumen 12/29/17 0042 Left Hand less than 1 day              Significant Labs:    CBC/Anemia Profile:    Recent Labs  Lab 12/28/17 2023   WBC 17.85*   HGB 14.6   HCT 42.7      MCV 87   RDW 11.9        Chemistries:    Recent Labs  Lab 12/28/17 2023      K 3.7      CO2 26   BUN 11   CREATININE 0.9   CALCIUM 9.7   ALBUMIN 3.9   PROT 7.9   BILITOT 0.6   ALKPHOS 125   ALT 32   AST 49*

## 2017-12-29 NOTE — ED NOTES
No signs of distress noted.Patient rights signed and on the chart. All cords and wires are out of the patients room. Patient belongings are removed from the room labeled and locked away. P.E.C is completed and on the chart. Patient sitter is at the bedside recording Q 15 minute checks. Sitter belongings are out of the room. Will continue to monitor the patient.     The patient is resting quietly, eyes closed, arouses easily to stimuli. Airway is open and patent, respirations are spontaneous, normal respiratory effort and rate noted, skin warm and dry, appearance: in no acute distress and resting comfortably.

## 2017-12-29 NOTE — PROVIDER PROGRESS NOTES - EMERGENCY DEPT.
Encounter Date: 12/28/2017    ED Physician Progress Notes             1:25 AM  Pt has area of redness on her left proximal clavicle that is indurated and there are pustules presented. There is identical redness on her jaw.     I, Haydee Shepherd, am scribing for, and in the presence of, Dr. Walker. I performed the above scribed service and the documentation accurately describes the services I performed. I attest to the accuracy of the note.

## 2017-12-29 NOTE — ASSESSMENT & PLAN NOTE
-Patient known to take benzodiazepenes for anxiety  -Will ensure seizure precautions as well as frequent neuro checks  -Will order ativan prn for withdrawals

## 2017-12-29 NOTE — ASSESSMENT & PLAN NOTE
-Patient presented in obtunded state with pinpoint pupils, GCS <8  -Upon administration of Narcan, patient began to wake up, GCS of 9, currently with nasal trumpet tolerating well  -Will continue to administer Narcan prn   -Will continue to monitor patient's neurological status for any decline

## 2017-12-29 NOTE — PROVIDER PROGRESS NOTES - EMERGENCY DEPT.
"Encounter Date: 12/28/2017    ED Physician Progress Notes           7:05 PM  Pt is a 38 y/o woman who presents after a suicide attempt and subsequent drug overdose. Pt has an opioid addiction and thought she could cure herself by using methamphetamine.  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 She has taken a lot of pills however we are unsure as to all of what she has taken. EMS did not collect any bottles or pills. Per her mother, she does take Ambien. At the scene a bag of notes were found that said "I have put some toxic substance on blanks and sheets. Do not resuscitate me. This is my time." In these notes, speed and opiates were mentioned. Pt also stuffed notes into her clothing that said "do not resuscitate me." Her father did notice some vomiting earlier today. When her mother arrived at the scene, pt had slurred speech and was subjectively unresponsive. Pt is now being decontaminated.    I, Haydee Shepherd, am scribing for, and in the presence of, Dr. Walker. I performed the above scribed service and the documentation accurately describes the services I performed. I attest to the accuracy of the note.     "

## 2017-12-30 LAB
ALBUMIN SERPL BCP-MCNC: 3.2 G/DL
ALP SERPL-CCNC: 108 U/L
ALT SERPL W/O P-5'-P-CCNC: 36 U/L
ANION GAP SERPL CALC-SCNC: 9 MMOL/L
APAP SERPL-MCNC: <3 UG/ML
APAP SERPL-MCNC: <3 UG/ML
AST SERPL-CCNC: 34 U/L
BASOPHILS # BLD AUTO: 0.04 K/UL
BASOPHILS NFR BLD: 0.3 %
BILIRUB SERPL-MCNC: 0.3 MG/DL
BUN SERPL-MCNC: 5 MG/DL
CALCIUM SERPL-MCNC: 9.3 MG/DL
CHLORIDE SERPL-SCNC: 104 MMOL/L
CO2 SERPL-SCNC: 29 MMOL/L
CREAT SERPL-MCNC: 0.7 MG/DL
DIFFERENTIAL METHOD: ABNORMAL
EOSINOPHIL # BLD AUTO: 0.2 K/UL
EOSINOPHIL NFR BLD: 1.7 %
ERYTHROCYTE [DISTWIDTH] IN BLOOD BY AUTOMATED COUNT: 12 %
EST. GFR  (AFRICAN AMERICAN): >60 ML/MIN/1.73 M^2
EST. GFR  (NON AFRICAN AMERICAN): >60 ML/MIN/1.73 M^2
GLUCOSE SERPL-MCNC: 75 MG/DL
HAV IGM SERPL QL IA: NEGATIVE
HBV CORE IGM SERPL QL IA: NEGATIVE
HBV SURFACE AG SERPL QL IA: NEGATIVE
HCT VFR BLD AUTO: 34.7 %
HCV AB SERPL QL IA: NEGATIVE
HGB BLD-MCNC: 12 G/DL
HIV 1+2 AB+HIV1 P24 AG SERPL QL IA: NEGATIVE
IMM GRANULOCYTES # BLD AUTO: 0.04 K/UL
IMM GRANULOCYTES NFR BLD AUTO: 0.3 %
INR PPP: 1.2
LYMPHOCYTES # BLD AUTO: 2.6 K/UL
LYMPHOCYTES NFR BLD: 22.9 %
MAGNESIUM SERPL-MCNC: 1.6 MG/DL
MCH RBC QN AUTO: 29.9 PG
MCHC RBC AUTO-ENTMCNC: 34.6 G/DL
MCV RBC AUTO: 87 FL
MONOCYTES # BLD AUTO: 0.6 K/UL
MONOCYTES NFR BLD: 5 %
NEUTROPHILS # BLD AUTO: 8 K/UL
NEUTROPHILS NFR BLD: 69.8 %
NRBC BLD-RTO: 0 /100 WBC
PHOSPHATE SERPL-MCNC: 2.5 MG/DL
PLATELET # BLD AUTO: 327 K/UL
PMV BLD AUTO: 10.3 FL
POTASSIUM SERPL-SCNC: 3.1 MMOL/L
PROT SERPL-MCNC: 6.7 G/DL
PROTHROMBIN TIME: 12.8 SEC
RBC # BLD AUTO: 4.01 M/UL
SODIUM SERPL-SCNC: 142 MMOL/L
TRICYCLICS SERPL-MCNC: 312.5 NG/ML
WBC # BLD AUTO: 11.53 K/UL

## 2017-12-30 PROCEDURE — 85610 PROTHROMBIN TIME: CPT

## 2017-12-30 PROCEDURE — 99233 SBSQ HOSP IP/OBS HIGH 50: CPT | Mod: ,,, | Performed by: HOSPITALIST

## 2017-12-30 PROCEDURE — 84100 ASSAY OF PHOSPHORUS: CPT

## 2017-12-30 PROCEDURE — 25000003 PHARM REV CODE 250: Performed by: PSYCHIATRY & NEUROLOGY

## 2017-12-30 PROCEDURE — 80053 COMPREHEN METABOLIC PANEL: CPT

## 2017-12-30 PROCEDURE — 80329 ANALGESICS NON-OPIOID 1 OR 2: CPT | Mod: 59

## 2017-12-30 PROCEDURE — 63600175 PHARM REV CODE 636 W HCPCS: Performed by: HOSPITALIST

## 2017-12-30 PROCEDURE — 93010 ELECTROCARDIOGRAM REPORT: CPT | Mod: ,,, | Performed by: INTERNAL MEDICINE

## 2017-12-30 PROCEDURE — S4991 NICOTINE PATCH NONLEGEND: HCPCS | Performed by: INTERNAL MEDICINE

## 2017-12-30 PROCEDURE — 93005 ELECTROCARDIOGRAM TRACING: CPT

## 2017-12-30 PROCEDURE — 25000003 PHARM REV CODE 250: Performed by: INTERNAL MEDICINE

## 2017-12-30 PROCEDURE — 83735 ASSAY OF MAGNESIUM: CPT

## 2017-12-30 PROCEDURE — 25000003 PHARM REV CODE 250

## 2017-12-30 PROCEDURE — 63600175 PHARM REV CODE 636 W HCPCS: Performed by: STUDENT IN AN ORGANIZED HEALTH CARE EDUCATION/TRAINING PROGRAM

## 2017-12-30 PROCEDURE — 85025 COMPLETE CBC W/AUTO DIFF WBC: CPT

## 2017-12-30 PROCEDURE — 36415 COLL VENOUS BLD VENIPUNCTURE: CPT

## 2017-12-30 PROCEDURE — 11000001 HC ACUTE MED/SURG PRIVATE ROOM

## 2017-12-30 PROCEDURE — 80307 DRUG TEST PRSMV CHEM ANLYZR: CPT

## 2017-12-30 RX ORDER — LORAZEPAM 2 MG/ML
2 INJECTION INTRAMUSCULAR EVERY 6 HOURS PRN
Status: DISCONTINUED | OUTPATIENT
Start: 2017-12-30 | End: 2017-12-30

## 2017-12-30 RX ORDER — LORAZEPAM 2 MG/ML
2 INJECTION INTRAMUSCULAR EVERY 5 MIN PRN
Status: DISCONTINUED | OUTPATIENT
Start: 2017-12-30 | End: 2017-12-30

## 2017-12-30 RX ORDER — LORAZEPAM 1 MG/1
2 TABLET ORAL EVERY 6 HOURS PRN
Status: DISCONTINUED | OUTPATIENT
Start: 2017-12-30 | End: 2018-01-04 | Stop reason: HOSPADM

## 2017-12-30 RX ADMIN — LORAZEPAM 2 MG: 1 TABLET ORAL at 10:12

## 2017-12-30 RX ADMIN — ENOXAPARIN SODIUM 40 MG: 100 INJECTION SUBCUTANEOUS at 06:12

## 2017-12-30 RX ADMIN — LORAZEPAM 2 MG: 2 INJECTION, SOLUTION INTRAMUSCULAR; INTRAVENOUS at 12:12

## 2017-12-30 RX ADMIN — NICOTINE 1 PATCH: 21 PATCH, EXTENDED RELEASE TRANSDERMAL at 09:12

## 2017-12-30 RX ADMIN — POTASSIUM CHLORIDE 40 MEQ: 1500 TABLET, EXTENDED RELEASE ORAL at 09:12

## 2017-12-30 RX ADMIN — POTASSIUM CHLORIDE 40 MEQ: 1500 TABLET, EXTENDED RELEASE ORAL at 10:12

## 2017-12-30 NOTE — PLAN OF CARE
Problem: Patient Care Overview  Goal: Plan of Care Review  Fall precaution maintained this shift call bell in reach bed in low position. Sitter at bedisde. Pt have been crying and depressed through out the night. Vs stable. No s/s of withdrawals noted.

## 2017-12-30 NOTE — NURSING
Pt arrived to floor via security and sitter. Pt crying and stating that she will go through DT if she do not have her medication. Oriented pt to room, call bell in reach, bed in low position. Instructed pt to call for assistance. Room pec ready. Telephone and bags removed from room

## 2017-12-30 NOTE — CARE UPDATE
Spoke to hospital medicine about stepdown from critical care to hospital medicine service tomorrow at 7 AM.    Stevo Delaney DO PGY-III  [facility]  Medicine Resident   Pager [phone]

## 2017-12-30 NOTE — ED NOTES
"Patient requesting pain medication for her withdrawals. Patient is not diaphoretic or tachycardic at this time. Patient states that she has never had "service like this before." patient states she wants to go home and that she "will kill herself whether it was yesterday or in a few days or 5 years." Patient informed that when she is medically stable psychiatry and/or the  will be by to see the patient. NAD noted. Cardiac/pulse ox/blood pressure monitor remain on with alarms set.  "

## 2017-12-31 PROBLEM — F32.A DEPRESSION: Status: ACTIVE | Noted: 2017-12-31

## 2017-12-31 PROBLEM — F13.20 SEDATIVE HYPNOTIC OR ANXIOLYTIC DEPENDENCE: Status: ACTIVE | Noted: 2017-12-31

## 2017-12-31 LAB
ALBUMIN SERPL BCP-MCNC: 3.3 G/DL
ALP SERPL-CCNC: 103 U/L
ALT SERPL W/O P-5'-P-CCNC: 29 U/L
ANION GAP SERPL CALC-SCNC: 9 MMOL/L
APAP SERPL-MCNC: <3 UG/ML
APAP SERPL-MCNC: <3 UG/ML
AST SERPL-CCNC: 25 U/L
BACTERIA #/AREA URNS AUTO: ABNORMAL /HPF
BASOPHILS # BLD AUTO: 0.03 K/UL
BASOPHILS NFR BLD: 0.3 %
BILIRUB SERPL-MCNC: 0.2 MG/DL
BILIRUB UR QL STRIP: NEGATIVE
BUN SERPL-MCNC: 12 MG/DL
CALCIUM SERPL-MCNC: 9.5 MG/DL
CHLORIDE SERPL-SCNC: 108 MMOL/L
CLARITY UR REFRACT.AUTO: ABNORMAL
CO2 SERPL-SCNC: 23 MMOL/L
COLOR UR AUTO: ABNORMAL
CREAT SERPL-MCNC: 0.7 MG/DL
DIFFERENTIAL METHOD: ABNORMAL
EOSINOPHIL # BLD AUTO: 0.3 K/UL
EOSINOPHIL NFR BLD: 3 %
ERYTHROCYTE [DISTWIDTH] IN BLOOD BY AUTOMATED COUNT: 12 %
EST. GFR  (AFRICAN AMERICAN): >60 ML/MIN/1.73 M^2
EST. GFR  (NON AFRICAN AMERICAN): >60 ML/MIN/1.73 M^2
GLUCOSE SERPL-MCNC: 77 MG/DL
GLUCOSE UR QL STRIP: NEGATIVE
HCT VFR BLD AUTO: 32.4 %
HGB BLD-MCNC: 11.2 G/DL
HGB UR QL STRIP: NEGATIVE
HYALINE CASTS UR QL AUTO: 0 /LPF
IMM GRANULOCYTES # BLD AUTO: 0.03 K/UL
IMM GRANULOCYTES NFR BLD AUTO: 0.3 %
INR PPP: 1.1
KETONES UR QL STRIP: ABNORMAL
LEUKOCYTE ESTERASE UR QL STRIP: ABNORMAL
LYMPHOCYTES # BLD AUTO: 2.3 K/UL
LYMPHOCYTES NFR BLD: 25.2 %
MAGNESIUM SERPL-MCNC: 1.7 MG/DL
MCH RBC QN AUTO: 30.1 PG
MCHC RBC AUTO-ENTMCNC: 34.6 G/DL
MCV RBC AUTO: 87 FL
MICROSCOPIC COMMENT: ABNORMAL
MONOCYTES # BLD AUTO: 0.7 K/UL
MONOCYTES NFR BLD: 7.2 %
NEUTROPHILS # BLD AUTO: 5.8 K/UL
NEUTROPHILS NFR BLD: 64 %
NITRITE UR QL STRIP: POSITIVE
NRBC BLD-RTO: 0 /100 WBC
PH UR STRIP: 7 [PH] (ref 5–8)
PHOSPHATE SERPL-MCNC: 2.4 MG/DL
PLATELET # BLD AUTO: 313 K/UL
PMV BLD AUTO: 10.2 FL
POTASSIUM SERPL-SCNC: 3.5 MMOL/L
PROT SERPL-MCNC: 6.7 G/DL
PROT UR QL STRIP: ABNORMAL
PROTHROMBIN TIME: 11.4 SEC
RBC # BLD AUTO: 3.72 M/UL
RBC #/AREA URNS AUTO: 0 /HPF (ref 0–4)
SODIUM SERPL-SCNC: 140 MMOL/L
SP GR UR STRIP: 1.02 (ref 1–1.03)
SQUAMOUS #/AREA URNS AUTO: 2 /HPF
TRICYCLICS SERPL-MCNC: 263.2 NG/ML
URN SPEC COLLECT METH UR: ABNORMAL
UROBILINOGEN UR STRIP-ACNC: NEGATIVE EU/DL
WBC # BLD AUTO: 9.13 K/UL
WBC #/AREA URNS AUTO: >100 /HPF (ref 0–5)

## 2017-12-31 PROCEDURE — 99233 SBSQ HOSP IP/OBS HIGH 50: CPT | Mod: AF,HB,, | Performed by: PSYCHIATRY & NEUROLOGY

## 2017-12-31 PROCEDURE — 84100 ASSAY OF PHOSPHORUS: CPT

## 2017-12-31 PROCEDURE — 63600175 PHARM REV CODE 636 W HCPCS: Performed by: HOSPITALIST

## 2017-12-31 PROCEDURE — 85025 COMPLETE CBC W/AUTO DIFF WBC: CPT

## 2017-12-31 PROCEDURE — 80053 COMPREHEN METABOLIC PANEL: CPT

## 2017-12-31 PROCEDURE — 36415 COLL VENOUS BLD VENIPUNCTURE: CPT

## 2017-12-31 PROCEDURE — 81001 URINALYSIS AUTO W/SCOPE: CPT

## 2017-12-31 PROCEDURE — S4991 NICOTINE PATCH NONLEGEND: HCPCS | Performed by: PHYSICIAN ASSISTANT

## 2017-12-31 PROCEDURE — 83735 ASSAY OF MAGNESIUM: CPT

## 2017-12-31 PROCEDURE — 25000003 PHARM REV CODE 250

## 2017-12-31 PROCEDURE — 93005 ELECTROCARDIOGRAM TRACING: CPT

## 2017-12-31 PROCEDURE — 25000003 PHARM REV CODE 250: Performed by: PHYSICIAN ASSISTANT

## 2017-12-31 PROCEDURE — 63600175 PHARM REV CODE 636 W HCPCS: Performed by: STUDENT IN AN ORGANIZED HEALTH CARE EDUCATION/TRAINING PROGRAM

## 2017-12-31 PROCEDURE — 80329 ANALGESICS NON-OPIOID 1 OR 2: CPT | Mod: 59

## 2017-12-31 PROCEDURE — 93010 ELECTROCARDIOGRAM REPORT: CPT | Mod: ,,, | Performed by: INTERNAL MEDICINE

## 2017-12-31 PROCEDURE — 25000003 PHARM REV CODE 250: Performed by: PSYCHIATRY & NEUROLOGY

## 2017-12-31 PROCEDURE — 99233 SBSQ HOSP IP/OBS HIGH 50: CPT | Mod: ,,, | Performed by: HOSPITALIST

## 2017-12-31 PROCEDURE — 80307 DRUG TEST PRSMV CHEM ANLYZR: CPT

## 2017-12-31 PROCEDURE — 85610 PROTHROMBIN TIME: CPT

## 2017-12-31 PROCEDURE — 11000001 HC ACUTE MED/SURG PRIVATE ROOM

## 2017-12-31 RX ORDER — CLONIDINE HYDROCHLORIDE 0.1 MG/1
0.1 TABLET ORAL EVERY 12 HOURS
Status: DISCONTINUED | OUTPATIENT
Start: 2017-12-31 | End: 2017-12-31

## 2017-12-31 RX ORDER — DICYCLOMINE HYDROCHLORIDE 10 MG/1
10 CAPSULE ORAL EVERY 6 HOURS PRN
Status: DISCONTINUED | OUTPATIENT
Start: 2017-12-31 | End: 2018-01-04 | Stop reason: HOSPADM

## 2017-12-31 RX ORDER — QUETIAPINE FUMARATE 100 MG/1
100 TABLET, FILM COATED ORAL NIGHTLY
Status: DISCONTINUED | OUTPATIENT
Start: 2017-12-31 | End: 2018-01-04 | Stop reason: HOSPADM

## 2017-12-31 RX ORDER — CLONIDINE HYDROCHLORIDE 0.1 MG/1
0.1 TABLET ORAL EVERY 6 HOURS PRN
Status: DISCONTINUED | OUTPATIENT
Start: 2017-12-31 | End: 2018-01-04 | Stop reason: HOSPADM

## 2017-12-31 RX ORDER — IBUPROFEN 600 MG/1
600 TABLET ORAL EVERY 6 HOURS PRN
Status: DISCONTINUED | OUTPATIENT
Start: 2017-12-31 | End: 2018-01-04 | Stop reason: HOSPADM

## 2017-12-31 RX ORDER — ONDANSETRON 8 MG/1
8 TABLET, ORALLY DISINTEGRATING ORAL ONCE
Status: COMPLETED | OUTPATIENT
Start: 2017-12-31 | End: 2017-12-31

## 2017-12-31 RX ORDER — IBUPROFEN 200 MG
1 TABLET ORAL DAILY
Status: DISCONTINUED | OUTPATIENT
Start: 2017-12-31 | End: 2018-01-02

## 2017-12-31 RX ORDER — LORAZEPAM 2 MG/ML
1 INJECTION INTRAMUSCULAR ONCE
Status: COMPLETED | OUTPATIENT
Start: 2017-12-31 | End: 2017-12-31

## 2017-12-31 RX ORDER — LAMOTRIGINE 25 MG/1
25 TABLET ORAL DAILY
Status: DISCONTINUED | OUTPATIENT
Start: 2018-01-01 | End: 2018-01-04 | Stop reason: HOSPADM

## 2017-12-31 RX ORDER — SERTRALINE HYDROCHLORIDE 50 MG/1
50 TABLET, FILM COATED ORAL DAILY
Status: DISCONTINUED | OUTPATIENT
Start: 2018-01-01 | End: 2018-01-03

## 2017-12-31 RX ORDER — MIRTAZAPINE 15 MG/1
15 TABLET, FILM COATED ORAL NIGHTLY
Status: DISCONTINUED | OUTPATIENT
Start: 2017-12-31 | End: 2018-01-01

## 2017-12-31 RX ORDER — LOPERAMIDE HYDROCHLORIDE 2 MG/1
2 CAPSULE ORAL
Status: DISCONTINUED | OUTPATIENT
Start: 2017-12-31 | End: 2018-01-04 | Stop reason: HOSPADM

## 2017-12-31 RX ADMIN — LORAZEPAM 2 MG: 1 TABLET ORAL at 09:12

## 2017-12-31 RX ADMIN — QUETIAPINE FUMARATE 100 MG: 100 TABLET, FILM COATED ORAL at 09:12

## 2017-12-31 RX ADMIN — NICOTINE 1 PATCH: 21 PATCH, EXTENDED RELEASE TRANSDERMAL at 08:12

## 2017-12-31 RX ADMIN — DICYCLOMINE HYDROCHLORIDE 10 MG: 10 CAPSULE ORAL at 01:12

## 2017-12-31 RX ADMIN — LORAZEPAM 1 MG: 2 INJECTION, SOLUTION INTRAMUSCULAR; INTRAVENOUS at 10:12

## 2017-12-31 RX ADMIN — NICOTINE 1 PATCH: 21 PATCH, EXTENDED RELEASE TRANSDERMAL at 12:12

## 2017-12-31 RX ADMIN — LORAZEPAM 2 MG: 1 TABLET ORAL at 03:12

## 2017-12-31 RX ADMIN — IBUPROFEN 600 MG: 600 TABLET, FILM COATED ORAL at 01:12

## 2017-12-31 RX ADMIN — ONDANSETRON 8 MG: 8 TABLET, ORALLY DISINTEGRATING ORAL at 12:12

## 2017-12-31 RX ADMIN — MIRTAZAPINE 15 MG: 15 TABLET, FILM COATED ORAL at 09:12

## 2017-12-31 RX ADMIN — IBUPROFEN 600 MG: 600 TABLET, FILM COATED ORAL at 09:12

## 2017-12-31 RX ADMIN — ENOXAPARIN SODIUM 40 MG: 100 INJECTION SUBCUTANEOUS at 05:12

## 2017-12-31 RX ADMIN — POTASSIUM CHLORIDE 40 MEQ: 1500 TABLET, EXTENDED RELEASE ORAL at 08:12

## 2017-12-31 RX ADMIN — LORAZEPAM 2 MG: 1 TABLET ORAL at 06:12

## 2017-12-31 NOTE — NURSING
"Pt c/o of anxiety and is requesting pain medication.  JOYCELYN Stephen notified and states "this pt is in for OD; I'm going to let psych handle this situation."  OC in touch with psych on call; new orders noted for lorazepam po.    "

## 2017-12-31 NOTE — PROGRESS NOTES
Pt had concerns about when she will be able to see Psych Resident. Paged Dr. Moser. Resident states that she will try to see pt later today if possible. Attempted to Notify LUZMA Burnett. RN was busy.

## 2017-12-31 NOTE — HOSPITAL COURSE
Patient was admitted to the ICU for suicide attempt with drug overdose. After monitoring overnight patient was transferred to the floor. Bicarbonate drip started in the ICU but later discontinued. Antipsychotics held by Psychiatry consult.  Zoloft, Remeron, Lamictal and Seroquel,subsequently restarted. Psychiatry had long conversation with patient and family at bedside, who is still tearful and frustrated as to the expected length of stay and the impending transfer to an inpatient Psychiatric facility. Patient is actively complaining of pain and discomfort. Started on home meds. Patient expressed interest in transferring to [facility] or possibly [place] for treatment of both her depression and addiction.     * Suicide attempt by multiple drug overdose     -Patient ingested unknown amount as well as identity of multiple substances  -UDS positive for benzos and opiates  -Will contact family for more information  -Patient currently PEC'd  -Psychiatry consulted  -Patient's home meds include amphetamine salt, clonidine, flexaril, cyproheptadine, valium, fentanyl, lamictal, ativan, remeron, zofran, oxycodone, lyrica, seroquil, and zoloft  -Salicylate level <5.0  -Acetaminophen level normal  - Addiction Psychiatry consulted   - Awaiting placement to Inpatient Psychiatry unit, will need PT/Ot prior   -Patient started on NAC, will continue to trend acetominophen levels q 12 hours  -Liver enzymes not elevated  - daily TCA levels normal   -Spoke to patient's father who is unfamiliar with patient's medications, however he gave us mother's number. When we tried to contact mother, number was disconnected. Also attempted to contact patient's cell phone, but this number was also disconnected   -EKG showed sinus tachycardia,   -Review of  shows regular prescriptions for oxycodone 20 mg dispense 360 tablets q 30 days, valium 10 mg dispense 30 tablets q 30 days, lorazepam 1 mg dispense 90 tablets q 30 days, fentanyl patches as  well. This goes as far back as 1 calendar year.  - Restart  Zoloft, Remeron, Lamictal and Seroquel on 12/31/17  - Remeron increased to 30mg QHS on 01/02/18       Encephalopathy, toxic     Resolved           Tricyclic overdose, intentional self-harm, initial encounter       >500.0   312.5   TCA Scrn   On Bicarbonate drip while in the ICU, discontinued due to metabolic alkalosis              Acetaminophen overdose, intentional self-harm, initial encounter     Tylenol levels normal, 21 on admission          Benzodiazepine dependence     Ativan PRN          Leucocytosis     Resolved           Anxiety     -Patient known to take benzodiazepenes for anxiety  -Will ensure seizure precautions as well as frequent neuro checks  -Will order ativan prn for withdrawals        Opioid dependence with intoxication     -Patient presented in obtunded state with pinpoint pupils, GCS <8  -Upon administration of Narcan, patient began to wake up, GCS of 9, currently with nasal trumpet tolerating well  - avoid opioids for now  - treat withdrawal symptomatically with clonidine prn       Impaired mobility and ADLs     - seen by PT/OT, they have cleared patient to go to rehab

## 2017-12-31 NOTE — ASSESSMENT & PLAN NOTE
- Agree with PEC in setting of deliberate suicide attempt, will seek inpatient placement once medically cleared, currently being admitted from ED to ICU. At this time concern for patient developing possible serotonin syndrome, NMS, tylenol toxicity and TCA toxicity. Overdose on Seroquel also likely given prolonged Qtc (since ED arrival 453 -> 497).  - Per her suicide note recent use of heroin and methamphetamines, UDS + opiates and benzos  - Home regimen per ICU team amphetamine salts, clonidine, flexaril, cyproheptadine, valium, fentanyl, lamictal, ativan, remeron, zofran, oxycodone, lyrica, seroquel, and zoloft.   - Per primary team: Review of  shows regular prescriptions for oxycodone 20 mg dispense 360 tablets q 30 days, valium 10 mg dispense 30 tablets q 30 days, lorazepam 1 mg dispense 90 tablets q 30 days, fentanyl patches as well. This goes as far back as 1 calendar year.  - Continue home psychotropic medications, increase Remeron to 30 mg po qhs for mood and sleep, and to gain therapeutic alliance with the patient.   - May use Ativan 2mg PO or IM q6 hours PRN severe anxiety; would avoid using antipsychotics     Plan:   - Pt in acute opiate withdrawal at this time  - Will discuss initiation of subutex taper detox vs. Continued conservative management.  Addiction Psychiatry to meet with patient tomorrow to .   - Continue PEC/CEC for danger to self and transfer to inpatient psych once medically cleared

## 2017-12-31 NOTE — NURSING
Pt cont to c/o N&V, though no vomit is seen.  JOYCEYLN Stephen notified; new orders noted for Zofran.

## 2017-12-31 NOTE — SUBJECTIVE & OBJECTIVE
Patient History           Medical as of 12/31/2017     Past Medical History     Diagnosis Date Comments Source    Angiosarcoma -- -- Provider    Angiosarcoma -- -- Provider    Anxiety -- -- Provider    Bone cancer -- -- Provider    Depression -- -- Provider          Pertinent Negatives     Diagnosis Date Noted Comments Source    Asthma 6/29/2015 -- Provider    COPD (chronic obstructive pulmonary disease) 6/29/2015 -- Provider    Diabetes mellitus 1/31/2015 -- Provider    Hypertension 6/29/2015 -- Provider                  Surgical as of 12/31/2017     Past Surgical History     Procedure Laterality Date Comments Source    LEG AMPUTATION AT HIP Left 7/2015 -- Provider                  Family as of 12/31/2017     Problem Relation Name Age of Onset Comments Source    Cancer Paternal Uncle great uncle -- lung Provider    Cancer Maternal Grandmother -- -- breast Provider    Heart attack Maternal Grandmother -- -- -- Provider    Arthritis Maternal Grandmother -- -- -- Provider    Heart attack Maternal Grandfather -- -- -- Provider    Arthritis Maternal Grandfather -- -- -- Provider    Diabetes Paternal Grandmother -- -- -- Provider    Hypertension Paternal Grandfather -- -- -- Provider    Heart attack Paternal Grandfather -- -- -- Provider    No Known Problems Father -- -- -- Provider            Tobacco Use as of 12/31/2017     Smoking Status Smoking Start Date Smoking Quit Date Packs/day Years Used    Former Smoker -- -- -- --    Types Comments Smokeless Tobacco Status Smokeless Tobacco Quit Date Source    -- -- Never Used -- Provider            Alcohol Use as of 12/31/2017     Alcohol Use Drinks/Week Alcohol/Week Comments Source    No -- -- -- Provider            Drug Use as of 12/31/2017     Drug Use Types Frequency Comments Source    No -- -- -- Provider            Sexual Activity as of 12/31/2017     Sexually Active Birth Control Partners Comments Source    Not Asked -- -- -- Provider            Activities of  Daily Living as of 12/31/2017    **None**           Social Documentation as of 12/31/2017    **None**           Occupational as of 12/31/2017    **None**           Socioeconomic as of 12/31/2017     Marital Status Spouse Name Number of Children Years Education Preferred Language Ethnicity Race Source    Single -- -- -- English /White White --         Pertinent History Q A Comments    as of 12/31/2017 Lives with      Place in Birth Order      Lives in      Number of Siblings      Raised by      Legal Involvement      Childhood Trauma      Criminal History of      Financial Status      Highest Level of Education      Does patient have access to a firearm?       Service      Primary Leisure Activity      Spirituality       Past Medical History:   Diagnosis Date    Angiosarcoma     Angiosarcoma     Anxiety     Bone cancer     Depression      Past Surgical History:   Procedure Laterality Date    LEG AMPUTATION AT HIP Left 7/2015     Family History     Problem Relation (Age of Onset)    Arthritis Maternal Grandmother, Maternal Grandfather    Cancer Paternal Uncle, Maternal Grandmother    Diabetes Paternal Grandmother    Heart attack Maternal Grandmother, Maternal Grandfather, Paternal Grandfather    Hypertension Paternal Grandfather    No Known Problems Father        Social History Main Topics    Smoking status: Former Smoker    Smokeless tobacco: Never Used    Alcohol use No    Drug use: No    Sexual activity: Not on file     Review of patient's allergies indicates:   Allergen Reactions    Phenergan [promethazine] Anxiety     Pt reports that she can take po phenergan without any reaction.        No current facility-administered medications on file prior to encounter.      Current Outpatient Prescriptions on File Prior to Encounter   Medication Sig    AMPHETAMINE SALT COMBO 15 MG tablet 30 mg.     cloNIDine (CATAPRES) 0.1 MG tablet Take 1 tablet (0.1 mg total) by mouth 3 (three) times daily  as needed (withdrawal).    cyclobenzaprine (FLEXERIL) 10 MG tablet     cyproheptadine (PERIACTIN) 4 mg tablet     diazepam (VALIUM) 5 MG tablet 10 mg.     fentanyl (DURAGESIC) 12 mcg/hr PT72     fentaNYL (DURAGESIC) 25 mcg/hr     lamotrigine (LAMICTAL) 25 MG tablet Take 2 tablets (50 mg total) by mouth once daily.    lorazepam (ATIVAN) 0.5 MG tablet Take 0.5 mg by mouth 2 (two) times daily.     lorazepam (ATIVAN) 1 MG tablet     mirtazapine (REMERON) 45 MG tablet Take 1 tablet (45 mg total) by mouth every evening.    ondansetron (ZOFRAN) 8 MG tablet Take 1 tablet (8 mg total) by mouth every 6 (six) hours as needed.    oxycodone (ROXICODONE) 10 mg Tab immediate release tablet Take 1-2 tablets (10-20 mg total) by mouth every 6 (six) hours as needed for Pain.    oxycodone (ROXICODONE) 5 MG immediate release tablet Take 2 tablets (10 mg total) by mouth every 6 (six) hours as needed for Pain.    pregabalin (LYRICA) 50 MG capsule Take 1 capsule (50 mg total) by mouth once daily.    promethazine (PHENERGAN) 25 MG tablet     quetiapine (SEROQUEL) 100 MG Tab Take 1 tablet (100 mg total) by mouth every evening.    senna-docusate 8.6-50 mg (PERICOLACE) 8.6-50 mg per tablet Take 2 tablets by mouth 2 (two) times daily.    sertraline (ZOLOFT) 50 MG tablet     sucralfate (CARAFATE) 1 gram tablet Take 1 g by mouth 4 (four) times daily.     Psychotherapeutics     Start     Stop Route Frequency Ordered    12/30/17 2207  LORazepam tablet 2 mg      -- Oral Every 6 hours PRN 12/30/17 2107        Review of Systems  Strengths and Liabilities: Strength: Patient is expressive/articulate., Liability: Patient is defensive., Liability: Patient lacks coping skills.    Objective:     Vital Signs (Most Recent):  Temp: 97.8 °F (36.6 °C) (12/31/17 0401)  Pulse: 77 (12/31/17 0401)  Resp: 15 (12/31/17 0401)  BP: 125/78 (12/31/17 0401)  SpO2: 97 % (12/31/17 0401) Vital Signs (24h Range):  Temp:  [97.3 °F (36.3 °C)-98.6 °F (37 °C)]  "97.8 °F (36.6 °C)  Pulse:  [56-97] 77  Resp:  [15-19] 15  SpO2:  [96 %-99 %] 97 %  BP: (123-157)/(75-92) 125/78     Height: 5' 4" (162.6 cm)  Weight: 63.5 kg (139 lb 15.9 oz)  Body mass index is 24.03 kg/m².    No intake or output data in the 24 hours ending 12/31/17 0842    Physical Exam   Appearance: AKA, fair grooming/hygeine wearing hospital gown in NAD; diaphoretic, dilated pupils, tremulous  Behavior: somewhat agitated, defensive, minimizing  Speech: normal rate, tone, and volume  Mood: depressed  Affect: anxious  Thought Process: linear  Thought Perceptions: denied AVH  Thought Content: denied SI, HI; no delusions apparent  Sensorium: awake, alert  Attention/Concentration: intact to conversation  Orientation: person, place, time, and situation  Memory: intact (recent, remote)  Abstraction: intact   Insight: poor  Judgment: good       Significant Labs:   Recent Results (from the past 48 hour(s))   Basic metabolic panel    Collection Time: 12/29/17  1:06 PM   Result Value Ref Range    Sodium 145 136 - 145 mmol/L    Potassium 2.3 (LL) 3.5 - 5.1 mmol/L    Chloride 98 95 - 110 mmol/L    CO2 37 (H) 23 - 29 mmol/L    Glucose 177 (H) 70 - 110 mg/dL    BUN, Bld 7 6 - 20 mg/dL    Creatinine 0.7 0.5 - 1.4 mg/dL    Calcium 8.8 8.7 - 10.5 mg/dL    Anion Gap 10 8 - 16 mmol/L    eGFR if African American >60.0 >60 mL/min/1.73 m^2    eGFR if non African American >60.0 >60 mL/min/1.73 m^2   Hepatic function panel    Collection Time: 12/29/17  1:06 PM   Result Value Ref Range    Total Protein 6.4 6.0 - 8.4 g/dL    Albumin 3.0 (L) 3.5 - 5.2 g/dL    Total Bilirubin 0.3 0.1 - 1.0 mg/dL    Bilirubin, Direct 0.2 0.1 - 0.3 mg/dL    AST 36 10 - 40 U/L    ALT 40 10 - 44 U/L    Alkaline Phosphatase 99 55 - 135 U/L   Acetaminophen level    Collection Time: 12/29/17  4:45 PM   Result Value Ref Range    Acetaminophen (Tylenol), Serum <3.0 (L) 10.0 - 20.0 ug/mL   Hepatitis panel, acute    Collection Time: 12/29/17  4:45 PM   Result Value " Ref Range    Hepatitis B Surface Ag Negative     Hep B C IgM Negative     Hep A IgM Negative     Hepatitis C Ab Negative    HIV-1 and HIV-2 antibodies    Collection Time: 12/29/17  4:45 PM   Result Value Ref Range    HIV 1/2 Ag/Ab Negative Negative   Comprehensive metabolic panel    Collection Time: 12/29/17  4:45 PM   Result Value Ref Range    Sodium 145 136 - 145 mmol/L    Potassium 2.2 (LL) 3.5 - 5.1 mmol/L    Chloride 98 95 - 110 mmol/L    CO2 38 (H) 23 - 29 mmol/L    Glucose 168 (H) 70 - 110 mg/dL    BUN, Bld 5 (L) 6 - 20 mg/dL    Creatinine 0.7 0.5 - 1.4 mg/dL    Calcium 9.1 8.7 - 10.5 mg/dL    Total Protein 6.8 6.0 - 8.4 g/dL    Albumin 3.2 (L) 3.5 - 5.2 g/dL    Total Bilirubin 0.3 0.1 - 1.0 mg/dL    Alkaline Phosphatase 106 55 - 135 U/L    AST 36 10 - 40 U/L    ALT 38 10 - 44 U/L    Anion Gap 9 8 - 16 mmol/L    eGFR if African American >60.0 >60 mL/min/1.73 m^2    eGFR if non African American >60.0 >60 mL/min/1.73 m^2   Comprehensive metabolic panel    Collection Time: 12/29/17  9:49 PM   Result Value Ref Range    Sodium 144 136 - 145 mmol/L    Potassium 2.6 (LL) 3.5 - 5.1 mmol/L    Chloride 105 95 - 110 mmol/L    CO2 29 23 - 29 mmol/L    Glucose 98 70 - 110 mg/dL    BUN, Bld 5 (L) 6 - 20 mg/dL    Creatinine 0.7 0.5 - 1.4 mg/dL    Calcium 9.3 8.7 - 10.5 mg/dL    Total Protein 7.0 6.0 - 8.4 g/dL    Albumin 3.3 (L) 3.5 - 5.2 g/dL    Total Bilirubin 0.3 0.1 - 1.0 mg/dL    Alkaline Phosphatase 111 55 - 135 U/L    AST 36 10 - 40 U/L    ALT 38 10 - 44 U/L    Anion Gap 10 8 - 16 mmol/L    eGFR if African American >60.0 >60 mL/min/1.73 m^2    eGFR if non African American >60.0 >60 mL/min/1.73 m^2   Comprehensive metabolic panel    Collection Time: 12/30/17  4:46 AM   Result Value Ref Range    Sodium 142 136 - 145 mmol/L    Potassium 3.1 (L) 3.5 - 5.1 mmol/L    Chloride 104 95 - 110 mmol/L    CO2 29 23 - 29 mmol/L    Glucose 75 70 - 110 mg/dL    BUN, Bld 5 (L) 6 - 20 mg/dL    Creatinine 0.7 0.5 - 1.4 mg/dL    Calcium  9.3 8.7 - 10.5 mg/dL    Total Protein 6.7 6.0 - 8.4 g/dL    Albumin 3.2 (L) 3.5 - 5.2 g/dL    Total Bilirubin 0.3 0.1 - 1.0 mg/dL    Alkaline Phosphatase 108 55 - 135 U/L    AST 34 10 - 40 U/L    ALT 36 10 - 44 U/L    Anion Gap 9 8 - 16 mmol/L    eGFR if African American >60.0 >60 mL/min/1.73 m^2    eGFR if non African American >60.0 >60 mL/min/1.73 m^2   Magnesium    Collection Time: 12/30/17  4:46 AM   Result Value Ref Range    Magnesium 1.6 1.6 - 2.6 mg/dL   Phosphorus    Collection Time: 12/30/17  4:46 AM   Result Value Ref Range    Phosphorus 2.5 (L) 2.7 - 4.5 mg/dL   CBC auto differential    Collection Time: 12/30/17  4:46 AM   Result Value Ref Range    WBC 11.53 3.90 - 12.70 K/uL    RBC 4.01 4.00 - 5.40 M/uL    Hemoglobin 12.0 12.0 - 16.0 g/dL    Hematocrit 34.7 (L) 37.0 - 48.5 %    MCV 87 82 - 98 fL    MCH 29.9 27.0 - 31.0 pg    MCHC 34.6 32.0 - 36.0 g/dL    RDW 12.0 11.5 - 14.5 %    Platelets 327 150 - 350 K/uL    MPV 10.3 9.2 - 12.9 fL    Immature Granulocytes 0.3 0.0 - 0.5 %    Gran # 8.0 (H) 1.8 - 7.7 K/uL    Immature Grans (Abs) 0.04 0.00 - 0.04 K/uL    Lymph # 2.6 1.0 - 4.8 K/uL    Mono # 0.6 0.3 - 1.0 K/uL    Eos # 0.2 0.0 - 0.5 K/uL    Baso # 0.04 0.00 - 0.20 K/uL    nRBC 0 0 /100 WBC    Gran% 69.8 38.0 - 73.0 %    Lymph% 22.9 18.0 - 48.0 %    Mono% 5.0 4.0 - 15.0 %    Eosinophil% 1.7 0.0 - 8.0 %    Basophil% 0.3 0.0 - 1.9 %    Differential Method Automated    Acetaminophen level    Collection Time: 12/30/17  4:46 AM   Result Value Ref Range    Acetaminophen (Tylenol), Serum <3.0 (L) 10.0 - 20.0 ug/mL   Protime-INR    Collection Time: 12/30/17  4:46 AM   Result Value Ref Range    Prothrombin Time 12.8 (H) 9.0 - 12.5 sec    INR 1.2 0.8 - 1.2   Tricyclic Screen, Serum    Collection Time: 12/30/17  4:46 AM   Result Value Ref Range    TCA Scrn 312.5 See Text ng/mL   Acetaminophen level    Collection Time: 12/30/17  4:25 PM   Result Value Ref Range    Acetaminophen (Tylenol), Serum <3.0 (L) 10.0 - 20.0  ug/mL   Urinalysis    Collection Time: 12/31/17  3:13 AM   Result Value Ref Range    Specimen UA Urine, Clean Catch     Color, UA Lisa Yellow, Straw, Lisa    Appearance, UA Cloudy (A) Clear    pH, UA 7.0 5.0 - 8.0    Specific Gravity, UA 1.025 1.005 - 1.030    Protein, UA 1+ (A) Negative    Glucose, UA Negative Negative    Ketones, UA 1+ (A) Negative    Bilirubin (UA) Negative Negative    Occult Blood UA Negative Negative    Nitrite, UA Positive (A) Negative    Urobilinogen, UA Negative <2.0 EU/dL    Leukocytes, UA 3+ (A) Negative   Urinalysis Microscopic    Collection Time: 12/31/17  3:13 AM   Result Value Ref Range    RBC, UA 0 0 - 4 /hpf    WBC, UA >100 (H) 0 - 5 /hpf    Bacteria, UA Many (A) None-Occ /hpf    Squam Epithel, UA 2 /hpf    Hyaline Casts, UA 0 0-1/lpf /lpf    Microscopic Comment SEE COMMENT    Comprehensive metabolic panel    Collection Time: 12/31/17  3:55 AM   Result Value Ref Range    Sodium 140 136 - 145 mmol/L    Potassium 3.5 3.5 - 5.1 mmol/L    Chloride 108 95 - 110 mmol/L    CO2 23 23 - 29 mmol/L    Glucose 77 70 - 110 mg/dL    BUN, Bld 12 6 - 20 mg/dL    Creatinine 0.7 0.5 - 1.4 mg/dL    Calcium 9.5 8.7 - 10.5 mg/dL    Total Protein 6.7 6.0 - 8.4 g/dL    Albumin 3.3 (L) 3.5 - 5.2 g/dL    Total Bilirubin 0.2 0.1 - 1.0 mg/dL    Alkaline Phosphatase 103 55 - 135 U/L    AST 25 10 - 40 U/L    ALT 29 10 - 44 U/L    Anion Gap 9 8 - 16 mmol/L    eGFR if African American >60.0 >60 mL/min/1.73 m^2    eGFR if non African American >60.0 >60 mL/min/1.73 m^2   Magnesium    Collection Time: 12/31/17  3:55 AM   Result Value Ref Range    Magnesium 1.7 1.6 - 2.6 mg/dL   Phosphorus    Collection Time: 12/31/17  3:55 AM   Result Value Ref Range    Phosphorus 2.4 (L) 2.7 - 4.5 mg/dL   CBC auto differential    Collection Time: 12/31/17  3:55 AM   Result Value Ref Range    WBC 9.13 3.90 - 12.70 K/uL    RBC 3.72 (L) 4.00 - 5.40 M/uL    Hemoglobin 11.2 (L) 12.0 - 16.0 g/dL    Hematocrit 32.4 (L) 37.0 - 48.5 %     MCV 87 82 - 98 fL    MCH 30.1 27.0 - 31.0 pg    MCHC 34.6 32.0 - 36.0 g/dL    RDW 12.0 11.5 - 14.5 %    Platelets 313 150 - 350 K/uL    MPV 10.2 9.2 - 12.9 fL    Immature Granulocytes 0.3 0.0 - 0.5 %    Gran # 5.8 1.8 - 7.7 K/uL    Immature Grans (Abs) 0.03 0.00 - 0.04 K/uL    Lymph # 2.3 1.0 - 4.8 K/uL    Mono # 0.7 0.3 - 1.0 K/uL    Eos # 0.3 0.0 - 0.5 K/uL    Baso # 0.03 0.00 - 0.20 K/uL    nRBC 0 0 /100 WBC    Gran% 64.0 38.0 - 73.0 %    Lymph% 25.2 18.0 - 48.0 %    Mono% 7.2 4.0 - 15.0 %    Eosinophil% 3.0 0.0 - 8.0 %    Basophil% 0.3 0.0 - 1.9 %    Differential Method Automated    Acetaminophen level    Collection Time: 12/31/17  3:55 AM   Result Value Ref Range    Acetaminophen (Tylenol), Serum <3.0 (L) 10.0 - 20.0 ug/mL   Protime-INR    Collection Time: 12/31/17  3:55 AM   Result Value Ref Range    Prothrombin Time 11.4 9.0 - 12.5 sec    INR 1.1 0.8 - 1.2   Tricyclic Screen, Serum    Collection Time: 12/31/17  3:55 AM   Result Value Ref Range    TCA Scrn 263.2 See Text ng/mL      No results found for: PHENYTOIN, PHENOBARB, VALPROATE, CBMZ      Significant Imaging: I have reviewed all pertinent imaging results/findings within the past 24 hours.

## 2017-12-31 NOTE — PROGRESS NOTES
"[facility] Hospital Medicine  Progress Note    Patient Name: Val Gil  MRN: [id number]  Patient Class: IP- Inpatient   Admission Date: 12/28/2017  Length of Stay: 2 days  Attending Physician: David London MD  Primary Care Provider: [facility] Medicine Team: [facility] David London MD    Subjective:     Principal Problem:Suicide attempt by multiple drug overdose    HPI:  36 y/o woman who presents after a suicide attempt and subsequent drug overdose. Pt has an opioid addiction and thought she could cure herself by using methamphetamine.  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 She has taken a lot of pills however we are unsure as to all of what she has taken. EMS did not collect any bottles or pills. Per her mother, she does take Ambien. At the scene a bag of notes were found that said "I have put some toxic substance on blanks and sheets. Do not resuscitate me. This is my time." In these notes, speed and opiates were mentioned. Pt also stuffed notes into her clothing that said "do not resuscitate me." Her father did notice some vomiting earlier today. When her mother arrived at the scene, pt had slurred speech and was subjectively unresponsive. Pt is now being decontaminated.  Critical care was consulted after patient could not be aroused after initial evaluation in the ED. She was given one dose of Narcan, after which she began to awake. She still had slurred speech and would not engage in conversation or follow commands. Will call family to obtain more collateral.        Hospital Course:  Patient was admitted to the ICU for suicide attempt with drug overdose. After monitoring overnight patient was transferred to the floor. Bicarbonate drip started in the ICU but later discontinued. Antipsychotics held by Psychiatry consult.     Interval History: Patient seen and examined at bedside, transferred out of the ICU. Agitated " and expressing suicidal ideations.     Review of Systems   Constitutional: Negative for chills and fatigue.   HENT: Positive for drooling. Negative for dental problem.    Eyes: Negative for pain and discharge.   Respiratory: Negative for apnea, cough and chest tightness.    Cardiovascular: Negative for chest pain.   Gastrointestinal: Negative for abdominal distention and abdominal pain.   Genitourinary: Negative for enuresis and flank pain.   Musculoskeletal: Negative for gait problem and joint swelling.   Neurological: Negative for seizures and syncope.   Psychiatric/Behavioral: Positive for agitation, behavioral problems and suicidal ideas. The patient is nervous/anxious.      Objective:     Vital Signs (Most Recent):  Temp: 98 °F (36.7 °C) (12/30/17 1705)  Pulse: (!) 56 (12/30/17 1705)  Resp: 19 (12/30/17 1201)  BP: (!) 157/84 (12/30/17 1705)  SpO2: 97 % (12/30/17 1705) Vital Signs (24h Range):  Temp:  [97 °F (36.1 °C)-98.7 °F (37.1 °C)] 98 °F (36.7 °C)  Pulse:  [] 56  Resp:  [16-19] 19  SpO2:  [94 %-100 %] 97 %  BP: (133-157)/(81-98) 157/84     Weight: 63.5 kg (139 lb 15.9 oz)  Body mass index is 24.03 kg/m².  No intake or output data in the 24 hours ending 12/30/17 2008   Physical Exam   Constitutional: She appears well-developed and well-nourished.   HENT:   Head: Normocephalic.   Mouth/Throat: No oropharyngeal exudate.   Eyes: No scleral icterus.   Neck: Normal range of motion. No thyromegaly present.   Cardiovascular: Normal rate, regular rhythm, normal heart sounds and intact distal pulses.  Exam reveals no gallop and no friction rub.    No murmur heard.  Pulmonary/Chest: Effort normal and breath sounds normal. No respiratory distress. She has no wheezes. She exhibits no tenderness.   Abdominal: Soft. She exhibits no distension. There is no tenderness.   Musculoskeletal:   Patient with right AKA   Skin: She is not diaphoretic.       Significant Labs:   CBC:   Recent Labs  Lab 12/28/17 2023  12/29/17  0348 12/30/17  0446   WBC 17.85* 24.64* 11.53   HGB 14.6 12.8 12.0   HCT 42.7 36.5* 34.7*    292 327     CMP:   Recent Labs  Lab 12/29/17  1645 12/29/17  2149 12/30/17  0446    144 142   K 2.2* 2.6* 3.1*   CL 98 105 104   CO2 38* 29 29   * 98 75   BUN 5* 5* 5*   CREATININE 0.7 0.7 0.7   CALCIUM 9.1 9.3 9.3   PROT 6.8 7.0 6.7   ALBUMIN 3.2* 3.3* 3.2*   BILITOT 0.3 0.3 0.3   ALKPHOS 106 111 108   AST 36 36 34   ALT 38 38 36   ANIONGAP 9 10 9   EGFRNONAA >60.0 >60.0 >60.0     Coagulation:   Recent Labs  Lab 12/30/17  0446   INR 1.2     Lactic Acid: No results for input(s): LACTATE in the last 48 hours.  Lipase: No results for input(s): LIPASE in the last 48 hours.  Magnesium:   Recent Labs  Lab 12/29/17  0348 12/30/17  0446   MG 1.9 1.6       Significant Imaging: I have reviewed and interpreted all pertinent imaging results/findings within the past 24 hours.    Assessment/Plan:      * Suicide attempt by multiple drug overdose    -Patient ingested unknown amount as well as identity of multiple substances  -UDS positive for benzos and opiates  -Will contact family for more information  -Patient currently PEC'd  -Psychiatry consulted  -Patient's home meds include amphetamine salt, clonidine, flexaril, cyproheptadine, valium, fentanyl, lamictal, ativan, remeron, zofran, oxycodone, lyrica, seroquil, and zoloft  -Salicylate level <5.0  -Acetaminophen level 21->33  -Patient started on NAC, will continue to trend acetominophen levels q 12 hours  -Liver enzymes not elevated  -Will obtain lamotrigine and TCA level   -Spoke to patient's father who is unfamiliar with patient's medications, however he gave us mother's number. When we tried to contact mother, number was disconnected. Also attempted to contact patient's cell phone, but this number was also disconnected   -EKG showed sinus tachycardia,   -Review of  shows regular prescriptions for oxycodone 20 mg dispense 360 tablets q 30 days,  valium 10 mg dispense 30 tablets q 30 days, lorazepam 1 mg dispense 90 tablets q 30 days, fentanyl patches as well. This goes as far back as 1 calendar year.        Encephalopathy, toxic    Resolved           Tricyclic overdose, intentional self-harm, initial encounter     >500.0  312.5  TCA Scrn   On Bicarbonate drip while in the ICU, discontinued due to metabolic alkalosis             Acetaminophen overdose, intentional self-harm, initial encounter    Tylenol levels normal, 21 on admission          Leucocytosis    Resolved           Anxiety    -Patient known to take benzodiazepenes for anxiety  -Will ensure seizure precautions as well as frequent neuro checks  -Will order ativan prn for withdrawals         Opioid dependence with intoxication    -Patient presented in obtunded state with pinpoint pupils, GCS <8  -Upon administration of Narcan, patient began to wake up, GCS of 9, currently with nasal trumpet tolerating well        Impaired mobility and ADLs    -Will get PT and OT to evaluate patient          VTE Risk Mitigation         Ordered     enoxaparin injection 40 mg  Daily     Route:  Subcutaneous        12/29/17 0051     Medium Risk of VTE  Once      12/29/17 0102     Place sequential compression device  Until discontinued      12/29/17 0102              David London MD  Department of Hospital Medicine   [facility]

## 2017-12-31 NOTE — HPI
"38 y/o woman who presents after a suicide attempt and subsequent drug overdose. Pt has an opioid addiction and thought she could cure herself by using methamphetamine.  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 She has taken a lot of pills however we are unsure as to all of what she has taken. EMS did not collect any bottles or pills. Per her mother, she does take Ambien. At the scene a bag of notes were found that said "I have put some toxic substance on blanks and sheets. Do not resuscitate me. This is my time." In these notes, speed and opiates were mentioned. Pt also stuffed notes into her clothing that said "do not resuscitate me." Her father did notice some vomiting earlier today. When her mother arrived at the scene, pt had slurred speech and was subjectively unresponsive. Pt is now being decontaminated.  Critical care was consulted after patient could not be aroused after initial evaluation in the ED. She was given one dose of Narcan, after which she began to awake. She still had slurred speech and would not engage in conversation or follow commands. Will call family to obtain more collateral.      "

## 2017-12-31 NOTE — SUBJECTIVE & OBJECTIVE
Interval History: Patient seen and examined at bedside, transferred out of the ICU. Agitated and expressing suicidal ideations.     Review of Systems   Constitutional: Negative for chills and fatigue.   HENT: Positive for drooling. Negative for dental problem.    Eyes: Negative for pain and discharge.   Respiratory: Negative for apnea, cough and chest tightness.    Cardiovascular: Negative for chest pain.   Gastrointestinal: Negative for abdominal distention and abdominal pain.   Genitourinary: Negative for enuresis and flank pain.   Musculoskeletal: Negative for gait problem and joint swelling.   Neurological: Negative for seizures and syncope.   Psychiatric/Behavioral: Positive for agitation, behavioral problems and suicidal ideas. The patient is nervous/anxious.      Objective:     Vital Signs (Most Recent):  Temp: 98 °F (36.7 °C) (12/30/17 1705)  Pulse: (!) 56 (12/30/17 1705)  Resp: 19 (12/30/17 1201)  BP: (!) 157/84 (12/30/17 1705)  SpO2: 97 % (12/30/17 1705) Vital Signs (24h Range):  Temp:  [97 °F (36.1 °C)-98.7 °F (37.1 °C)] 98 °F (36.7 °C)  Pulse:  [] 56  Resp:  [16-19] 19  SpO2:  [94 %-100 %] 97 %  BP: (133-157)/(81-98) 157/84     Weight: 63.5 kg (139 lb 15.9 oz)  Body mass index is 24.03 kg/m².  No intake or output data in the 24 hours ending 12/30/17 2008   Physical Exam   Constitutional: She appears well-developed and well-nourished.   HENT:   Head: Normocephalic.   Mouth/Throat: No oropharyngeal exudate.   Eyes: No scleral icterus.   Neck: Normal range of motion. No thyromegaly present.   Cardiovascular: Normal rate, regular rhythm, normal heart sounds and intact distal pulses.  Exam reveals no gallop and no friction rub.    No murmur heard.  Pulmonary/Chest: Effort normal and breath sounds normal. No respiratory distress. She has no wheezes. She exhibits no tenderness.   Abdominal: Soft. She exhibits no distension. There is no tenderness.   Musculoskeletal:   Patient with right AKA   Skin: She is  not diaphoretic.       Significant Labs:   CBC:   Recent Labs  Lab 12/28/17  2023 12/29/17  0348 12/30/17  0446   WBC 17.85* 24.64* 11.53   HGB 14.6 12.8 12.0   HCT 42.7 36.5* 34.7*    292 327     CMP:   Recent Labs  Lab 12/29/17  1645 12/29/17  2149 12/30/17  0446    144 142   K 2.2* 2.6* 3.1*   CL 98 105 104   CO2 38* 29 29   * 98 75   BUN 5* 5* 5*   CREATININE 0.7 0.7 0.7   CALCIUM 9.1 9.3 9.3   PROT 6.8 7.0 6.7   ALBUMIN 3.2* 3.3* 3.2*   BILITOT 0.3 0.3 0.3   ALKPHOS 106 111 108   AST 36 36 34   ALT 38 38 36   ANIONGAP 9 10 9   EGFRNONAA >60.0 >60.0 >60.0     Coagulation:   Recent Labs  Lab 12/30/17  0446   INR 1.2     Lactic Acid: No results for input(s): LACTATE in the last 48 hours.  Lipase: No results for input(s): LIPASE in the last 48 hours.  Magnesium:   Recent Labs  Lab 12/29/17 0348 12/30/17  0446   MG 1.9 1.6       Significant Imaging: I have reviewed and interpreted all pertinent imaging results/findings within the past 24 hours.

## 2017-12-31 NOTE — ASSESSMENT & PLAN NOTE
- Agree with [facility] in setting of deliberate suicide attempt, will seek inpatient placement once medically cleared, currently being admitted from ED to ICU. At this time concern for patient developing possible serotonin syndrome, NMS, tylenol toxicity and TCA toxicity. Overdose on Seroquel also likely given prolonged Qtc (since ED arrival 453 -> 497).  - Per her suicide note recent use of heroin and methamphetamines, UDS + opiates and benzos  - Home regimen per ICU team amphetamine salts, clonidine, flexaril, cyproheptadine, valium, fentanyl, lamictal, ativan, remeron, zofran, oxycodone, lyrica, seroquel, and zoloft.   - Per primary team: Review of  shows regular prescriptions for oxycodone 20 mg dispense 360 tablets q 30 days, valium 10 mg dispense 30 tablets q 30 days, lorazepam 1 mg dispense 90 tablets q 30 days, fentanyl patches as well. This goes as far back as 1 calendar year.  - Hold all psychotropics at this time in setting of acute overdose.   - May use Ativan 2mg PO or IM q6 hours PRN severe anxiety; would avoid using antipsychotics     Plan:   - Pt in acute opiate withdrawal at this time  - Will discuss initiation of subutex taper detox vs. Continued conservative management  - Will discuss when it is safe to restart some home meds

## 2017-12-31 NOTE — NURSING
"Pt cont to c/o withdrawal symptoms stating "I have never been treated this way.  There has got to be something they can give me to stop this withdrawal. I need something, I cant take it." JOYCELYN Stephen notified and new orders noted for PRN withdrawal meds.   "

## 2017-12-31 NOTE — ASSESSMENT & PLAN NOTE
-Patient ingested unknown amount as well as identity of multiple substances  -UDS positive for benzos and opiates  -Will contact family for more information  -Patient currently PEC'd  -Psychiatry consulted  -Patient's home meds include amphetamine salt, clonidine, flexaril, cyproheptadine, valium, fentanyl, lamictal, ativan, remeron, zofran, oxycodone, lyrica, seroquil, and zoloft  -Salicylate level <5.0  -Acetaminophen level 21->33  -Patient started on NAC, will continue to trend acetominophen levels q 12 hours  -Liver enzymes not elevated  -Will obtain lamotrigine and TCA level   -Spoke to patient's father who is unfamiliar with patient's medications, however he gave us mother's number. When we tried to contact mother, number was disconnected. Also attempted to contact patient's cell phone, but this number was also disconnected   -EKG showed sinus tachycardia,   -Review of  shows regular prescriptions for oxycodone 20 mg dispense 360 tablets q 30 days, valium 10 mg dispense 30 tablets q 30 days, lorazepam 1 mg dispense 90 tablets q 30 days, fentanyl patches as well. This goes as far back as 1 calendar year.

## 2017-12-31 NOTE — ASSESSMENT & PLAN NOTE
>500.0  312.5  TCA Scrn   On Bicarbonate drip while in the ICU, discontinued due to metabolic alkalosis

## 2017-12-31 NOTE — PROGRESS NOTES
"[facility]  Psychiatry  Progress Note    Patient Name: Val Gil  MRN: [id number]   Code Status: Full Code  Admission Date: 12/28/2017  Hospital Length of Stay: 3 days  Expected Discharge Date:   Attending Physician: David London MD  Primary Care Provider: [facility]      Subjective:     Principal Problem:Suicide attempt by multiple drug overdose    Chief Complaint: SA    HPI: Val Gil is a 37 y.o. female with past psychiatric history of GLORIA, Depression and Adjustment d/o and PMH of Sarcoma s/p R AKA 2015 who was BIB EMS after intentional overdose in suicide attempt.    Per ED MD note:  History gained from EMS and from patient's mother and from a note that was left by the patient at bedside. Mother reports that patient was home during the day and did not know about patient states until she arrived home at 1800 hrs.  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  Additionally the mother reports here that she was on Percocets but that was eventually shifted to [place].  Her mother also the patient began using heroin in the last month approximately. Patient's note said that she was upset about her heroin addiction, that she had some "speed" that she intended to use as a treatment for her heroin addiction.  The noted initially stated that she applied some toxic substance on the day closed blankets sheets to keep staff away from her.  There was a note additionally under her shirt and said DO NOT RESUSCITATE me I want to die.  Bladder pages of her longer noted airbag stated that she asked her father to wash the sheets and blankets so that her dog would not " "be exposed to whatever toxin that she had spread in the room-there was certainly no further information about what this toxic substance was. EMS reported the presence of multiple police rescue at the bedside already concerned rightly for the problem of hazardous exposures from a possible toxin at bedside.  Patient was poorly responsive as noted requiring painful stimuli to get arousal - no verbal response was detected.     Critical care was consulted after patient could not be aroused after initial evaluation in the ED. Per their note, she was given one dose of Narcan in ED, after which she began to awake. She still had slurred speech and would not engage in conversation or follow commands. They "spoke to patient's father who is unfamiliar with patient's medications, however he gave mother's number. When we tried to contact mother, number was disconnected. Also attempted to contact patient's cell phone, but this number was also disconnected. Patient's home meds include amphetamine salt, clonidine, flexaril, cyproheptadine, valium, fentanyl, lamictal, ativan, remeron, zofran, oxycodone, lyrica, seroquil, and zoloft. Review of  shows regular prescriptions for oxycodone 20 mg dispense 360 tablets q 30 days, valium 10 mg dispense 30 tablets q 30 days, lorazepam 1 mg dispense 90 tablets q 30 days, fentanyl patches as well. This goes as far back as 1 calendar year."    On attempt to interview, patient sleeping on ED stretcher, unable to arouse with verbal stimuli or gentle leg shaking. No family present at bedside.    Per chart review, patient previously seen by psychiatry in 2015 for medication management during admission when she received AKA. She was on Lamictal, Remeron, Seroquel, Zoloft at that time; Zoloft was eventually changed to [place]ella. Ativan was used PRN anxiety. Patient did not require inpatient psychiatric hospitalization at that time.     Interval update 12/31/17: Pt agitated, shivering, pupils dilated, " "diaphoretic. Pt c/o opiate withdrawal at this time. She is anxious with irritable mood. Pt states multiple life stressors related to her cancer and AKA that have been building for past few years. Pt and her father were kicked out of their rental house ~1 month ago which was "the straw that broke the camel's back." Pt had been thinking of and planning her suicide for 1 week prior to the attempt. Pt becomes tearful many times during interview, and emotionally states "I didn't want to.. Life was just too much. I'm tired of living in pain." Pt may be unreliable, as she denies heroin use (despite admitting it in suicide note) and does not report accurate dose/frequency of her pain regimen. Pt takes Lamictal 75mg qd, Zoloft 100mg qd, Seroquel 100mg qhs, and Remeron 45mg qhs for her depression. Pt again becomes tearful at conclusion of interview, stating that she is "so tired and burnt out on life." Pt minimizes her suicide attempt and drug use and does not think she needs inpatient psych.    Collateral: mother Dilip Reis, [phone]. [facility]ang [facility] and [facility].    Per psychiatry consult note 7/15/15, information will be updated as able:  Past Psychiatric History:   Previous Psychiatric Hospitalizations: No   Previous Medication Trials: Yes  Previous Suicide Attempts: No   History of Violence: No   Outpatient psychiatrist: Dr. 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    Marital Status: Single  Children: 0   Housing Status: with grandfather and mom   History of phys/sexual abuse: No   Access to gun: No       Substance Abuse History:   Recreational Drugs:THC in past but last used 10 years ago; per chart began using heroin approx 1 mo ago and using pain medication more than prescribed   Use of Alcohol: Social use   Tobacco Use:No   Rehab " History:No      Legal History:   Past Charges/Incarcerations:No   Pending charges: No      Family Psychiatric History:   Denies     Hospital Course: No notes on file         Patient History           Medical as of 12/31/2017     Past Medical History     Diagnosis Date Comments Source    Angiosarcoma -- -- Provider    Angiosarcoma -- -- Provider    Anxiety -- -- Provider    Bone cancer -- -- Provider    Depression -- -- Provider          Pertinent Negatives     Diagnosis Date Noted Comments Source    Asthma 6/29/2015 -- Provider    COPD (chronic obstructive pulmonary disease) 6/29/2015 -- Provider    Diabetes mellitus 1/31/2015 -- Provider    Hypertension 6/29/2015 -- Provider                  Surgical as of 12/31/2017     Past Surgical History     Procedure Laterality Date Comments Source    LEG AMPUTATION AT HIP Left 7/2015 -- Provider                  Family as of 12/31/2017     Problem Relation Name Age of Onset Comments Source    Cancer Paternal Uncle great uncle -- lung Provider    Cancer Maternal Grandmother -- -- breast Provider    Heart attack Maternal Grandmother -- -- -- Provider    Arthritis Maternal Grandmother -- -- -- Provider    Heart attack Maternal Grandfather -- -- -- Provider    Arthritis Maternal Grandfather -- -- -- Provider    Diabetes Paternal Grandmother -- -- -- Provider    Hypertension Paternal Grandfather -- -- -- Provider    Heart attack Paternal Grandfather -- -- -- Provider    No Known Problems Father -- -- -- Provider            Tobacco Use as of 12/31/2017     Smoking Status Smoking Start Date Smoking Quit Date Packs/day Years Used    Former Smoker -- -- -- --    Types Comments Smokeless Tobacco Status Smokeless Tobacco Quit Date Source    -- -- Never Used -- Provider            Alcohol Use as of 12/31/2017     Alcohol Use Drinks/Week Alcohol/Week Comments Source    No -- -- -- Provider            Drug Use as of 12/31/2017     Drug Use Types Frequency Comments Source    No -- -- --  Provider            Sexual Activity as of 12/31/2017     Sexually Active Birth Control Partners Comments Source    Not Asked -- -- -- Provider            Activities of Daily Living as of 12/31/2017    **None**           Social Documentation as of 12/31/2017    **None**           Occupational as of 12/31/2017    **None**           Socioeconomic as of 12/31/2017     Marital Status Spouse Name Number of Children Years Education Preferred Language Ethnicity Race Source    Single -- -- -- English /White White --         Pertinent History Q A Comments    as of 12/31/2017 Lives with      Place in Birth Order      Lives in      Number of Siblings      Raised by      Legal Involvement      Childhood Trauma      Criminal History of      Financial Status      Highest Level of Education      Does patient have access to a firearm?       Service      Primary Leisure Activity      Spirituality       Past Medical History:   Diagnosis Date    Angiosarcoma     Angiosarcoma     Anxiety     Bone cancer     Depression      Past Surgical History:   Procedure Laterality Date    LEG AMPUTATION AT HIP Left 7/2015     Family History     Problem Relation (Age of Onset)    Arthritis Maternal Grandmother, Maternal Grandfather    Cancer Paternal Uncle, Maternal Grandmother    Diabetes Paternal Grandmother    Heart attack Maternal Grandmother, Maternal Grandfather, Paternal Grandfather    Hypertension Paternal Grandfather    No Known Problems Father        Social History Main Topics    Smoking status: Former Smoker    Smokeless tobacco: Never Used    Alcohol use No    Drug use: No    Sexual activity: Not on file     Review of patient's allergies indicates:   Allergen Reactions    Phenergan [promethazine] Anxiety     Pt reports that she can take po phenergan without any reaction.        No current facility-administered medications on file prior to encounter.      Current Outpatient Prescriptions on File Prior to  Encounter   Medication Sig    AMPHETAMINE SALT COMBO 15 MG tablet 30 mg.     cloNIDine (CATAPRES) 0.1 MG tablet Take 1 tablet (0.1 mg total) by mouth 3 (three) times daily as needed (withdrawal).    cyclobenzaprine (FLEXERIL) 10 MG tablet     cyproheptadine (PERIACTIN) 4 mg tablet     diazepam (VALIUM) 5 MG tablet 10 mg.     fentanyl (DURAGESIC) 12 mcg/hr PT72     fentaNYL (DURAGESIC) 25 mcg/hr     lamotrigine (LAMICTAL) 25 MG tablet Take 2 tablets (50 mg total) by mouth once daily.    lorazepam (ATIVAN) 0.5 MG tablet Take 0.5 mg by mouth 2 (two) times daily.     lorazepam (ATIVAN) 1 MG tablet     mirtazapine (REMERON) 45 MG tablet Take 1 tablet (45 mg total) by mouth every evening.    ondansetron (ZOFRAN) 8 MG tablet Take 1 tablet (8 mg total) by mouth every 6 (six) hours as needed.    oxycodone (ROXICODONE) 10 mg Tab immediate release tablet Take 1-2 tablets (10-20 mg total) by mouth every 6 (six) hours as needed for Pain.    oxycodone (ROXICODONE) 5 MG immediate release tablet Take 2 tablets (10 mg total) by mouth every 6 (six) hours as needed for Pain.    pregabalin (LYRICA) 50 MG capsule Take 1 capsule (50 mg total) by mouth once daily.    promethazine (PHENERGAN) 25 MG tablet     quetiapine (SEROQUEL) 100 MG Tab Take 1 tablet (100 mg total) by mouth every evening.    senna-docusate 8.6-50 mg (PERICOLACE) 8.6-50 mg per tablet Take 2 tablets by mouth 2 (two) times daily.    sertraline (ZOLOFT) 50 MG tablet     sucralfate (CARAFATE) 1 gram tablet Take 1 g by mouth 4 (four) times daily.     Psychotherapeutics     Start     Stop Route Frequency Ordered    12/30/17 2207  LORazepam tablet 2 mg      -- Oral Every 6 hours PRN 12/30/17 2107        Review of Systems  Strengths and Liabilities: Strength: Patient is expressive/articulate., Liability: Patient is defensive., Liability: Patient lacks coping skills.    Objective:     Vital Signs (Most Recent):  Temp: 97.8 °F (36.6 °C) (12/31/17  "0401)  Pulse: 77 (12/31/17 0401)  Resp: 15 (12/31/17 0401)  BP: 125/78 (12/31/17 0401)  SpO2: 97 % (12/31/17 0401) Vital Signs (24h Range):  Temp:  [97.3 °F (36.3 °C)-98.6 °F (37 °C)] 97.8 °F (36.6 °C)  Pulse:  [56-97] 77  Resp:  [15-19] 15  SpO2:  [96 %-99 %] 97 %  BP: (123-157)/(75-92) 125/78     Height: 5' 4" (162.6 cm)  Weight: 63.5 kg (139 lb 15.9 oz)  Body mass index is 24.03 kg/m².    No intake or output data in the 24 hours ending 12/31/17 0842    Physical Exam   Appearance: AKA, fair grooming/hygeine wearing hospital gown in NAD; diaphoretic, dilated pupils, tremulous  Behavior: somewhat agitated, defensive, minimizing  Speech: normal rate, tone, and volume  Mood: depressed  Affect: anxious  Thought Process: linear  Thought Perceptions: denied AVH  Thought Content: denied SI, HI; no delusions apparent  Sensorium: awake, alert  Attention/Concentration: intact to conversation  Orientation: person, place, time, and situation  Memory: intact (recent, remote)  Abstraction: intact   Insight: poor  Judgment: good       Significant Labs:   Recent Results (from the past 48 hour(s))   Basic metabolic panel    Collection Time: 12/29/17  1:06 PM   Result Value Ref Range    Sodium 145 136 - 145 mmol/L    Potassium 2.3 (LL) 3.5 - 5.1 mmol/L    Chloride 98 95 - 110 mmol/L    CO2 37 (H) 23 - 29 mmol/L    Glucose 177 (H) 70 - 110 mg/dL    BUN, Bld 7 6 - 20 mg/dL    Creatinine 0.7 0.5 - 1.4 mg/dL    Calcium 8.8 8.7 - 10.5 mg/dL    Anion Gap 10 8 - 16 mmol/L    eGFR if African American >60.0 >60 mL/min/1.73 m^2    eGFR if non African American >60.0 >60 mL/min/1.73 m^2   Hepatic function panel    Collection Time: 12/29/17  1:06 PM   Result Value Ref Range    Total Protein 6.4 6.0 - 8.4 g/dL    Albumin 3.0 (L) 3.5 - 5.2 g/dL    Total Bilirubin 0.3 0.1 - 1.0 mg/dL    Bilirubin, Direct 0.2 0.1 - 0.3 mg/dL    AST 36 10 - 40 U/L    ALT 40 10 - 44 U/L    Alkaline Phosphatase 99 55 - 135 U/L   Acetaminophen level    Collection Time: " 12/29/17  4:45 PM   Result Value Ref Range    Acetaminophen (Tylenol), Serum <3.0 (L) 10.0 - 20.0 ug/mL   Hepatitis panel, acute    Collection Time: 12/29/17  4:45 PM   Result Value Ref Range    Hepatitis B Surface Ag Negative     Hep B C IgM Negative     Hep A IgM Negative     Hepatitis C Ab Negative    HIV-1 and HIV-2 antibodies    Collection Time: 12/29/17  4:45 PM   Result Value Ref Range    HIV 1/2 Ag/Ab Negative Negative   Comprehensive metabolic panel    Collection Time: 12/29/17  4:45 PM   Result Value Ref Range    Sodium 145 136 - 145 mmol/L    Potassium 2.2 (LL) 3.5 - 5.1 mmol/L    Chloride 98 95 - 110 mmol/L    CO2 38 (H) 23 - 29 mmol/L    Glucose 168 (H) 70 - 110 mg/dL    BUN, Bld 5 (L) 6 - 20 mg/dL    Creatinine 0.7 0.5 - 1.4 mg/dL    Calcium 9.1 8.7 - 10.5 mg/dL    Total Protein 6.8 6.0 - 8.4 g/dL    Albumin 3.2 (L) 3.5 - 5.2 g/dL    Total Bilirubin 0.3 0.1 - 1.0 mg/dL    Alkaline Phosphatase 106 55 - 135 U/L    AST 36 10 - 40 U/L    ALT 38 10 - 44 U/L    Anion Gap 9 8 - 16 mmol/L    eGFR if African American >60.0 >60 mL/min/1.73 m^2    eGFR if non African American >60.0 >60 mL/min/1.73 m^2   Comprehensive metabolic panel    Collection Time: 12/29/17  9:49 PM   Result Value Ref Range    Sodium 144 136 - 145 mmol/L    Potassium 2.6 (LL) 3.5 - 5.1 mmol/L    Chloride 105 95 - 110 mmol/L    CO2 29 23 - 29 mmol/L    Glucose 98 70 - 110 mg/dL    BUN, Bld 5 (L) 6 - 20 mg/dL    Creatinine 0.7 0.5 - 1.4 mg/dL    Calcium 9.3 8.7 - 10.5 mg/dL    Total Protein 7.0 6.0 - 8.4 g/dL    Albumin 3.3 (L) 3.5 - 5.2 g/dL    Total Bilirubin 0.3 0.1 - 1.0 mg/dL    Alkaline Phosphatase 111 55 - 135 U/L    AST 36 10 - 40 U/L    ALT 38 10 - 44 U/L    Anion Gap 10 8 - 16 mmol/L    eGFR if African American >60.0 >60 mL/min/1.73 m^2    eGFR if non African American >60.0 >60 mL/min/1.73 m^2   Comprehensive metabolic panel    Collection Time: 12/30/17  4:46 AM   Result Value Ref Range    Sodium 142 136 - 145 mmol/L    Potassium 3.1  (L) 3.5 - 5.1 mmol/L    Chloride 104 95 - 110 mmol/L    CO2 29 23 - 29 mmol/L    Glucose 75 70 - 110 mg/dL    BUN, Bld 5 (L) 6 - 20 mg/dL    Creatinine 0.7 0.5 - 1.4 mg/dL    Calcium 9.3 8.7 - 10.5 mg/dL    Total Protein 6.7 6.0 - 8.4 g/dL    Albumin 3.2 (L) 3.5 - 5.2 g/dL    Total Bilirubin 0.3 0.1 - 1.0 mg/dL    Alkaline Phosphatase 108 55 - 135 U/L    AST 34 10 - 40 U/L    ALT 36 10 - 44 U/L    Anion Gap 9 8 - 16 mmol/L    eGFR if African American >60.0 >60 mL/min/1.73 m^2    eGFR if non African American >60.0 >60 mL/min/1.73 m^2   Magnesium    Collection Time: 12/30/17  4:46 AM   Result Value Ref Range    Magnesium 1.6 1.6 - 2.6 mg/dL   Phosphorus    Collection Time: 12/30/17  4:46 AM   Result Value Ref Range    Phosphorus 2.5 (L) 2.7 - 4.5 mg/dL   CBC auto differential    Collection Time: 12/30/17  4:46 AM   Result Value Ref Range    WBC 11.53 3.90 - 12.70 K/uL    RBC 4.01 4.00 - 5.40 M/uL    Hemoglobin 12.0 12.0 - 16.0 g/dL    Hematocrit 34.7 (L) 37.0 - 48.5 %    MCV 87 82 - 98 fL    MCH 29.9 27.0 - 31.0 pg    MCHC 34.6 32.0 - 36.0 g/dL    RDW 12.0 11.5 - 14.5 %    Platelets 327 150 - 350 K/uL    MPV 10.3 9.2 - 12.9 fL    Immature Granulocytes 0.3 0.0 - 0.5 %    Gran # 8.0 (H) 1.8 - 7.7 K/uL    Immature Grans (Abs) 0.04 0.00 - 0.04 K/uL    Lymph # 2.6 1.0 - 4.8 K/uL    Mono # 0.6 0.3 - 1.0 K/uL    Eos # 0.2 0.0 - 0.5 K/uL    Baso # 0.04 0.00 - 0.20 K/uL    nRBC 0 0 /100 WBC    Gran% 69.8 38.0 - 73.0 %    Lymph% 22.9 18.0 - 48.0 %    Mono% 5.0 4.0 - 15.0 %    Eosinophil% 1.7 0.0 - 8.0 %    Basophil% 0.3 0.0 - 1.9 %    Differential Method Automated    Acetaminophen level    Collection Time: 12/30/17  4:46 AM   Result Value Ref Range    Acetaminophen (Tylenol), Serum <3.0 (L) 10.0 - 20.0 ug/mL   Protime-INR    Collection Time: 12/30/17  4:46 AM   Result Value Ref Range    Prothrombin Time 12.8 (H) 9.0 - 12.5 sec    INR 1.2 0.8 - 1.2   Tricyclic Screen, Serum    Collection Time: 12/30/17  4:46 AM   Result Value  Ref Range    TCA Scrn 312.5 See Text ng/mL   Acetaminophen level    Collection Time: 12/30/17  4:25 PM   Result Value Ref Range    Acetaminophen (Tylenol), Serum <3.0 (L) 10.0 - 20.0 ug/mL   Urinalysis    Collection Time: 12/31/17  3:13 AM   Result Value Ref Range    Specimen UA Urine, Clean Catch     Color, UA Lisa Yellow, Straw, Lisa    Appearance, UA Cloudy (A) Clear    pH, UA 7.0 5.0 - 8.0    Specific Gravity, UA 1.025 1.005 - 1.030    Protein, UA 1+ (A) Negative    Glucose, UA Negative Negative    Ketones, UA 1+ (A) Negative    Bilirubin (UA) Negative Negative    Occult Blood UA Negative Negative    Nitrite, UA Positive (A) Negative    Urobilinogen, UA Negative <2.0 EU/dL    Leukocytes, UA 3+ (A) Negative   Urinalysis Microscopic    Collection Time: 12/31/17  3:13 AM   Result Value Ref Range    RBC, UA 0 0 - 4 /hpf    WBC, UA >100 (H) 0 - 5 /hpf    Bacteria, UA Many (A) None-Occ /hpf    Squam Epithel, UA 2 /hpf    Hyaline Casts, UA 0 0-1/lpf /lpf    Microscopic Comment SEE COMMENT    Comprehensive metabolic panel    Collection Time: 12/31/17  3:55 AM   Result Value Ref Range    Sodium 140 136 - 145 mmol/L    Potassium 3.5 3.5 - 5.1 mmol/L    Chloride 108 95 - 110 mmol/L    CO2 23 23 - 29 mmol/L    Glucose 77 70 - 110 mg/dL    BUN, Bld 12 6 - 20 mg/dL    Creatinine 0.7 0.5 - 1.4 mg/dL    Calcium 9.5 8.7 - 10.5 mg/dL    Total Protein 6.7 6.0 - 8.4 g/dL    Albumin 3.3 (L) 3.5 - 5.2 g/dL    Total Bilirubin 0.2 0.1 - 1.0 mg/dL    Alkaline Phosphatase 103 55 - 135 U/L    AST 25 10 - 40 U/L    ALT 29 10 - 44 U/L    Anion Gap 9 8 - 16 mmol/L    eGFR if African American >60.0 >60 mL/min/1.73 m^2    eGFR if non African American >60.0 >60 mL/min/1.73 m^2   Magnesium    Collection Time: 12/31/17  3:55 AM   Result Value Ref Range    Magnesium 1.7 1.6 - 2.6 mg/dL   Phosphorus    Collection Time: 12/31/17  3:55 AM   Result Value Ref Range    Phosphorus 2.4 (L) 2.7 - 4.5 mg/dL   CBC auto differential    Collection Time:  12/31/17  3:55 AM   Result Value Ref Range    WBC 9.13 3.90 - 12.70 K/uL    RBC 3.72 (L) 4.00 - 5.40 M/uL    Hemoglobin 11.2 (L) 12.0 - 16.0 g/dL    Hematocrit 32.4 (L) 37.0 - 48.5 %    MCV 87 82 - 98 fL    MCH 30.1 27.0 - 31.0 pg    MCHC 34.6 32.0 - 36.0 g/dL    RDW 12.0 11.5 - 14.5 %    Platelets 313 150 - 350 K/uL    MPV 10.2 9.2 - 12.9 fL    Immature Granulocytes 0.3 0.0 - 0.5 %    Gran # 5.8 1.8 - 7.7 K/uL    Immature Grans (Abs) 0.03 0.00 - 0.04 K/uL    Lymph # 2.3 1.0 - 4.8 K/uL    Mono # 0.7 0.3 - 1.0 K/uL    Eos # 0.3 0.0 - 0.5 K/uL    Baso # 0.03 0.00 - 0.20 K/uL    nRBC 0 0 /100 WBC    Gran% 64.0 38.0 - 73.0 %    Lymph% 25.2 18.0 - 48.0 %    Mono% 7.2 4.0 - 15.0 %    Eosinophil% 3.0 0.0 - 8.0 %    Basophil% 0.3 0.0 - 1.9 %    Differential Method Automated    Acetaminophen level    Collection Time: 12/31/17  3:55 AM   Result Value Ref Range    Acetaminophen (Tylenol), Serum <3.0 (L) 10.0 - 20.0 ug/mL   Protime-INR    Collection Time: 12/31/17  3:55 AM   Result Value Ref Range    Prothrombin Time 11.4 9.0 - 12.5 sec    INR 1.1 0.8 - 1.2   Tricyclic Screen, Serum    Collection Time: 12/31/17  3:55 AM   Result Value Ref Range    TCA Scrn 263.2 See Text ng/mL      No results found for: PHENYTOIN, PHENOBARB, VALPROATE, CBMZ      Significant Imaging: I have reviewed all pertinent imaging results/findings within the past 24 hours.    Assessment/Plan:     * Suicide attempt by multiple drug overdose    - Agree with [facility] in setting of deliberate suicide attempt, will seek inpatient placement once medically cleared, currently being admitted from ED to ICU. At this time concern for patient developing possible serotonin syndrome, NMS, tylenol toxicity and TCA toxicity. Overdose on Seroquel also likely given prolonged Qtc (since ED arrival 453 -> 497).  - Per her suicide note recent use of heroin and methamphetamines, UDS + opiates and benzos  - Home regimen per ICU team amphetamine salts, clonidine, flexaril,  cyproheptadine, valium, fentanyl, lamictal, ativan, remeron, zofran, oxycodone, lyrica, seroquel, and zoloft.   - Per primary team: Review of  shows regular prescriptions for oxycodone 20 mg dispense 360 tablets q 30 days, valium 10 mg dispense 30 tablets q 30 days, lorazepam 1 mg dispense 90 tablets q 30 days, fentanyl patches as well. This goes as far back as 1 calendar year.  - Hold all psychotropics at this time in setting of acute overdose.   - May use Ativan 2mg PO or IM q6 hours PRN severe anxiety; would avoid using antipsychotics     Plan:   - Pt in acute opiate withdrawal at this time  - Will discuss initiation of subutex taper detox vs. Continued conservative management  - Will discuss when it is safe to restart some home meds  - Continue [facility] for danger to self and transfer to inpatient psych once medically cleared                 Need for Continued Hospitalization:   Psychiatric illness continues to pose a potential threat to life or bodily function, of self or others, thereby requiring the need for continued inpatient psychiatric hospitalization.    Anticipated Disposition: Psychiatric Hospital     Total time:  35 with greater than 50% of this time spent in counseling and/or coordination of care.       Liam Alcaraz MD  [facility]  12/31/2017 8:51 AM

## 2017-12-31 NOTE — ASSESSMENT & PLAN NOTE
-Patient presented in obtunded state with pinpoint pupils, GCS <8  -Upon administration of Narcan, patient began to wake up, GCS of 9, currently with nasal trumpet tolerating well

## 2017-12-31 NOTE — NURSING
Pt states that her nicotine patch was removed when she had a shower during the day. Pt requests to have new one placed.  JOYCELYN Stephen notified and new orders noted for one time patch.

## 2018-01-01 LAB
ALBUMIN SERPL BCP-MCNC: 3.7 G/DL
ALP SERPL-CCNC: 106 U/L
ALT SERPL W/O P-5'-P-CCNC: 27 U/L
ANION GAP SERPL CALC-SCNC: 8 MMOL/L
APAP SERPL-MCNC: <3 UG/ML
APAP SERPL-MCNC: <3 UG/ML
AST SERPL-CCNC: 21 U/L
BASOPHILS # BLD AUTO: 0.02 K/UL
BASOPHILS NFR BLD: 0.3 %
BILIRUB SERPL-MCNC: 0.4 MG/DL
BUN SERPL-MCNC: 12 MG/DL
CALCIUM SERPL-MCNC: 10 MG/DL
CHLORIDE SERPL-SCNC: 108 MMOL/L
CO2 SERPL-SCNC: 24 MMOL/L
CREAT SERPL-MCNC: 0.8 MG/DL
DIFFERENTIAL METHOD: ABNORMAL
EOSINOPHIL # BLD AUTO: 0.1 K/UL
EOSINOPHIL NFR BLD: 1.9 %
ERYTHROCYTE [DISTWIDTH] IN BLOOD BY AUTOMATED COUNT: 11.9 %
EST. GFR  (AFRICAN AMERICAN): >60 ML/MIN/1.73 M^2
EST. GFR  (NON AFRICAN AMERICAN): >60 ML/MIN/1.73 M^2
GLUCOSE SERPL-MCNC: 93 MG/DL
HCT VFR BLD AUTO: 35 %
HGB BLD-MCNC: 12.1 G/DL
IMM GRANULOCYTES # BLD AUTO: 0.06 K/UL
IMM GRANULOCYTES NFR BLD AUTO: 0.9 %
INR PPP: 1.1
LYMPHOCYTES # BLD AUTO: 1.7 K/UL
LYMPHOCYTES NFR BLD: 23.5 %
MAGNESIUM SERPL-MCNC: 2 MG/DL
MCH RBC QN AUTO: 29.4 PG
MCHC RBC AUTO-ENTMCNC: 34.6 G/DL
MCV RBC AUTO: 85 FL
MONOCYTES # BLD AUTO: 0.4 K/UL
MONOCYTES NFR BLD: 5.4 %
NEUTROPHILS # BLD AUTO: 4.8 K/UL
NEUTROPHILS NFR BLD: 68 %
NRBC BLD-RTO: 0 /100 WBC
PHOSPHATE SERPL-MCNC: 2.6 MG/DL
PLATELET # BLD AUTO: 357 K/UL
PMV BLD AUTO: 10.2 FL
POTASSIUM SERPL-SCNC: 3.6 MMOL/L
PROT SERPL-MCNC: 7.4 G/DL
PROTHROMBIN TIME: 11.4 SEC
RBC # BLD AUTO: 4.11 M/UL
SODIUM SERPL-SCNC: 140 MMOL/L
TRICYCLICS SERPL-MCNC: 232.1 NG/ML
WBC # BLD AUTO: 7.02 K/UL

## 2018-01-01 PROCEDURE — 97161 PT EVAL LOW COMPLEX 20 MIN: CPT

## 2018-01-01 PROCEDURE — 83735 ASSAY OF MAGNESIUM: CPT

## 2018-01-01 PROCEDURE — 85610 PROTHROMBIN TIME: CPT

## 2018-01-01 PROCEDURE — 25000003 PHARM REV CODE 250: Performed by: PHYSICIAN ASSISTANT

## 2018-01-01 PROCEDURE — 93010 ELECTROCARDIOGRAM REPORT: CPT | Mod: ,,, | Performed by: INTERNAL MEDICINE

## 2018-01-01 PROCEDURE — 25000003 PHARM REV CODE 250: Performed by: PSYCHIATRY & NEUROLOGY

## 2018-01-01 PROCEDURE — 63600175 PHARM REV CODE 636 W HCPCS: Performed by: STUDENT IN AN ORGANIZED HEALTH CARE EDUCATION/TRAINING PROGRAM

## 2018-01-01 PROCEDURE — 36415 COLL VENOUS BLD VENIPUNCTURE: CPT

## 2018-01-01 PROCEDURE — 80307 DRUG TEST PRSMV CHEM ANLYZR: CPT

## 2018-01-01 PROCEDURE — 11000001 HC ACUTE MED/SURG PRIVATE ROOM

## 2018-01-01 PROCEDURE — S4991 NICOTINE PATCH NONLEGEND: HCPCS | Performed by: PHYSICIAN ASSISTANT

## 2018-01-01 PROCEDURE — 93005 ELECTROCARDIOGRAM TRACING: CPT

## 2018-01-01 PROCEDURE — 99233 SBSQ HOSP IP/OBS HIGH 50: CPT | Mod: ,,, | Performed by: HOSPITALIST

## 2018-01-01 PROCEDURE — 80053 COMPREHEN METABOLIC PANEL: CPT

## 2018-01-01 PROCEDURE — 80329 ANALGESICS NON-OPIOID 1 OR 2: CPT | Mod: 59

## 2018-01-01 PROCEDURE — 25000003 PHARM REV CODE 250: Performed by: HOSPITALIST

## 2018-01-01 PROCEDURE — 84100 ASSAY OF PHOSPHORUS: CPT

## 2018-01-01 PROCEDURE — 85025 COMPLETE CBC W/AUTO DIFF WBC: CPT

## 2018-01-01 RX ORDER — MIRTAZAPINE 30 MG/1
30 TABLET, FILM COATED ORAL NIGHTLY
Status: DISCONTINUED | OUTPATIENT
Start: 2018-01-01 | End: 2018-01-04 | Stop reason: HOSPADM

## 2018-01-01 RX ORDER — PROMETHAZINE HYDROCHLORIDE 12.5 MG/1
12.5 TABLET ORAL EVERY 6 HOURS PRN
Status: DISCONTINUED | OUTPATIENT
Start: 2018-01-01 | End: 2018-01-04 | Stop reason: HOSPADM

## 2018-01-01 RX ADMIN — PROMETHAZINE HYDROCHLORIDE 12.5 MG: 12.5 TABLET ORAL at 11:01

## 2018-01-01 RX ADMIN — IBUPROFEN 600 MG: 600 TABLET, FILM COATED ORAL at 10:01

## 2018-01-01 RX ADMIN — LORAZEPAM 2 MG: 1 TABLET ORAL at 03:01

## 2018-01-01 RX ADMIN — LORAZEPAM 2 MG: 1 TABLET ORAL at 04:01

## 2018-01-01 RX ADMIN — PROMETHAZINE HYDROCHLORIDE 12.5 MG: 12.5 TABLET ORAL at 09:01

## 2018-01-01 RX ADMIN — LORAZEPAM 2 MG: 1 TABLET ORAL at 09:01

## 2018-01-01 RX ADMIN — NICOTINE 1 PATCH: 21 PATCH, EXTENDED RELEASE TRANSDERMAL at 08:01

## 2018-01-01 RX ADMIN — QUETIAPINE FUMARATE 100 MG: 100 TABLET, FILM COATED ORAL at 09:01

## 2018-01-01 RX ADMIN — SERTRALINE HYDROCHLORIDE 50 MG: 50 TABLET ORAL at 07:01

## 2018-01-01 RX ADMIN — ENOXAPARIN SODIUM 40 MG: 100 INJECTION SUBCUTANEOUS at 04:01

## 2018-01-01 RX ADMIN — LAMOTRIGINE 25 MG: 25 TABLET ORAL at 09:01

## 2018-01-01 RX ADMIN — MIRTAZAPINE 30 MG: 30 TABLET, FILM COATED ORAL at 09:01

## 2018-01-01 NOTE — NURSING
"Patient and patients mother stated patient is not allergic to Phenergan. Patients mother states "she had a reaction to some IV Phenergan years ago it caused her to have some anxiety but she takes oral at home". IM team X notified.   "

## 2018-01-01 NOTE — ASSESSMENT & PLAN NOTE
-Patient ingested unknown amount as well as identity of multiple substances  -UDS positive for benzos and opiates  -Will contact family for more information  -Patient currently PEC'd  -Psychiatry consulted  -Patient's home meds include amphetamine salt, clonidine, flexaril, cyproheptadine, valium, fentanyl, lamictal, ativan, remeron, zofran, oxycodone, lyrica, seroquil, and zoloft  -Salicylate level <5.0  -Acetaminophen level normal  - Addiction Psychiatry consulted   - Awaiting placement to Inpatient Psychiatry unit, will need PT/Ot prior   -Patient started on NAC, will continue to trend acetominophen levels q 12 hours  -Liver enzymes not elevated  - daily TCA levels normal   -Spoke to patient's father who is unfamiliar with patient's medications, however he gave us mother's number. When we tried to contact mother, number was disconnected. Also attempted to contact patient's cell phone, but this number was also disconnected   -EKG showed sinus tachycardia,   -Review of  shows regular prescriptions for oxycodone 20 mg dispense 360 tablets q 30 days, valium 10 mg dispense 30 tablets q 30 days, lorazepam 1 mg dispense 90 tablets q 30 days, fentanyl patches as well. This goes as far back as 1 calendar year.  - Restart  Zoloft, Remeron, Lamictal and Seroquel on 12/31/17

## 2018-01-01 NOTE — ASSESSMENT & PLAN NOTE
-Patient ingested unknown amount as well as identity of multiple substances  -UDS positive for benzos and opiates  -Will contact family for more information  -Patient currently PEC'd  -Psychiatry consulted  -Patient's home meds include amphetamine salt, clonidine, flexaril, cyproheptadine, valium, fentanyl, lamictal, ativan, remeron, zofran, oxycodone, lyrica, seroquil, and zoloft  -Salicylate level <5.0  -Acetaminophen level 21->33  - Awaiting placement to Inpatient Psychiatry unit, will need PT/Ot prior   -Patient started on NAC, will continue to trend acetominophen levels q 12 hours  -Liver enzymes not elevated  -Will obtain lamotrigine and TCA level   -Spoke to patient's father who is unfamiliar with patient's medications, however he gave us mother's number. When we tried to contact mother, number was disconnected. Also attempted to contact patient's cell phone, but this number was also disconnected   -EKG showed sinus tachycardia,   -Review of  shows regular prescriptions for oxycodone 20 mg dispense 360 tablets q 30 days, valium 10 mg dispense 30 tablets q 30 days, lorazepam 1 mg dispense 90 tablets q 30 days, fentanyl patches as well. This goes as far back as 1 calendar year.  - Restart  Zoloft, Remeron, Lamictal and Seroquel on 12/31/17

## 2018-01-01 NOTE — PROGRESS NOTES
"[facility] Hospital Medicine  Progress Note    Patient Name: Val Gil  MRN: [id number]  Patient Class: IP- Inpatient   Admission Date: 12/28/2017  Length of Stay: 3 days  Attending Physician: David London MD  Primary Care Provider: [facility] Medicine Team: [facility] X David London MD    Subjective:     Principal Problem:Suicide attempt by multiple drug overdose    HPI:  38 y/o woman who presents after a suicide attempt and subsequent drug overdose. Pt has an opioid addiction and thought she could cure herself by using methamphetamine.  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 She has taken a lot of pills however we are unsure as to all of what she has taken. EMS did not collect any bottles or pills. Per her mother, she does take Ambien. At the scene a bag of notes were found that said "I have put some toxic substance on blanks and sheets. Do not resuscitate me. This is my time." In these notes, speed and opiates were mentioned. Pt also stuffed notes into her clothing that said "do not resuscitate me." Her father did notice some vomiting earlier today. When her mother arrived at the scene, pt had slurred speech and was subjectively unresponsive. Pt is now being decontaminated.  Critical care was consulted after patient could not be aroused after initial evaluation in the ED. She was given one dose of Narcan, after which she began to awake. She still had slurred speech and would not engage in conversation or follow commands. Will call family to obtain more collateral.        Hospital Course:  Patient was admitted to the ICU for suicide attempt with drug overdose. After monitoring overnight patient was transferred to the floor. Bicarbonate drip started in the ICU but later discontinued. Antipsychotics held by Psychiatry consult.  Zoloft, Remeron, Lamictal and Seroquel,subsequently restarted. Psychiatry had long conversation " with patient and family at bedside, who is still tearful and frustrated as to the expected length of stay and the impending transfer to a Psychiatric facility.     Interval History: Patient seen and examined at bedside,seen by Psychiatry today who recommended restarting   Zoloft, Remeron, Lamictal and Seroquel. Patient still complains of diffuse pain especially of phantom limb.     Review of Systems   Constitutional: Negative for chills and fatigue.   HENT: Positive for drooling. Negative for dental problem.    Eyes: Negative for pain and discharge.   Respiratory: Negative for apnea, cough and chest tightness.    Cardiovascular: Negative for chest pain.   Gastrointestinal: Negative for abdominal distention and abdominal pain.   Genitourinary: Negative for enuresis and flank pain.   Musculoskeletal: Negative for gait problem and joint swelling.   Neurological: Negative for seizures and syncope.   Psychiatric/Behavioral: Positive for agitation, behavioral problems and suicidal ideas. The patient is nervous/anxious.      Objective:     Vital Signs (Most Recent):  Temp: 97.4 °F (36.3 °C) (12/31/17 1612)  Pulse: (!) 58 (12/31/17 1612)  Resp: 17 (12/31/17 1612)  BP: (!) 148/92 (12/31/17 1612)  SpO2: 100 % (12/31/17 1612) Vital Signs (24h Range):  Temp:  [97.4 °F (36.3 °C)-98.6 °F (37 °C)] 97.4 °F (36.3 °C)  Pulse:  [58-80] 58  Resp:  [15-19] 17  SpO2:  [96 %-100 %] 100 %  BP: (115-148)/(65-92) 148/92     Weight: 63.5 kg (139 lb 15.9 oz)  Body mass index is 24.03 kg/m².  No intake or output data in the 24 hours ending 12/31/17 1835   Physical Exam   Constitutional: She appears well-developed and well-nourished.   HENT:   Head: Normocephalic.   Mouth/Throat: No oropharyngeal exudate.   Eyes: No scleral icterus.   Neck: Normal range of motion. No thyromegaly present.   Cardiovascular: Normal rate, regular rhythm, normal heart sounds and intact distal pulses.  Exam reveals no gallop and no friction rub.    No murmur  heard.  Pulmonary/Chest: Effort normal and breath sounds normal. No respiratory distress. She has no wheezes. She exhibits no tenderness.   Abdominal: Soft. She exhibits no distension. There is no tenderness.   Musculoskeletal:   Patient with right AKA   Skin: She is not diaphoretic.       Significant Labs:   CBC:     Recent Labs  Lab 12/30/17 0446 12/31/17  0355   WBC 11.53 9.13   HGB 12.0 11.2*   HCT 34.7* 32.4*    313     CMP:     Recent Labs  Lab 12/29/17 2149 12/30/17 0446 12/31/17  0355    142 140   K 2.6* 3.1* 3.5    104 108   CO2 29 29 23   GLU 98 75 77   BUN 5* 5* 12   CREATININE 0.7 0.7 0.7   CALCIUM 9.3 9.3 9.5   PROT 7.0 6.7 6.7   ALBUMIN 3.3* 3.2* 3.3*   BILITOT 0.3 0.3 0.2   ALKPHOS 111 108 103   AST 36 34 25   ALT 38 36 29   ANIONGAP 10 9 9   EGFRNONAA >60.0 >60.0 >60.0     Coagulation:     Recent Labs  Lab 12/31/17  0355   INR 1.1     Lactic Acid: No results for input(s): LACTATE in the last 48 hours.  Lipase: No results for input(s): LIPASE in the last 48 hours.  Magnesium:     Recent Labs  Lab 12/30/17 0446 12/31/17  0355   MG 1.6 1.7       Significant Imaging: I have reviewed and interpreted all pertinent imaging results/findings within the past 24 hours.    Assessment/Plan:      * Suicide attempt by multiple drug overdose    -Patient ingested unknown amount as well as identity of multiple substances  -UDS positive for benzos and opiates  -Will contact family for more information  -Patient currently PEC'd  -Psychiatry consulted  -Patient's home meds include amphetamine salt, clonidine, flexaril, cyproheptadine, valium, fentanyl, lamictal, ativan, remeron, zofran, oxycodone, lyrica, seroquil, and zoloft  -Salicylate level <5.0  -Acetaminophen level 21->33  - Awaiting placement to Inpatient Psychiatry unit, will need PT/Ot prior   -Patient started on NAC, will continue to trend acetominophen levels q 12 hours  -Liver enzymes not elevated  -Will obtain lamotrigine and TCA level    -Spoke to patient's father who is unfamiliar with patient's medications, however he gave us mother's number. When we tried to contact mother, number was disconnected. Also attempted to contact patient's cell phone, but this number was also disconnected   -EKG showed sinus tachycardia,   -Review of  shows regular prescriptions for oxycodone 20 mg dispense 360 tablets q 30 days, valium 10 mg dispense 30 tablets q 30 days, lorazepam 1 mg dispense 90 tablets q 30 days, fentanyl patches as well. This goes as far back as 1 calendar year.  - Restart  Zoloft, Remeron, Lamictal and Seroquel on 12/31/17        Encephalopathy, toxic    Resolved           Tricyclic overdose, intentional self-harm, initial encounter     >500.0  312.5  TCA Scrn   On Bicarbonate drip while in the ICU, discontinued due to metabolic alkalosis             Acetaminophen overdose, intentional self-harm, initial encounter    Tylenol levels normal, 21 on admission          Benzodiazepine dependence    Ativan PRN          Leucocytosis    Resolved           Anxiety    -Patient known to take benzodiazepenes for anxiety  -Will ensure seizure precautions as well as frequent neuro checks  -Will order ativan prn for withdrawals         Opioid dependence with intoxication    -Patient presented in obtunded state with pinpoint pupils, GCS <8  -Upon administration of Narcan, patient began to wake up, GCS of 9, currently with nasal trumpet tolerating well  - avoid opioids for now  - treat withdrawal symptomatically with clonidine prn        Impaired mobility and ADLs    -Will get PT and OT to evaluate patient          VTE Risk Mitigation         Ordered     enoxaparin injection 40 mg  Daily     Route:  Subcutaneous        12/29/17 0051     Medium Risk of VTE  Once      12/29/17 0102     Place sequential compression device  Until discontinued      12/29/17 0102              David oLndon MD  Department of Hospital Medicine   [facility]  [place]

## 2018-01-01 NOTE — SUBJECTIVE & OBJECTIVE
Interval History: Patient seen and examined at bedside. Reports some mild improvement in symptoms but still reports pain is high. Possible transfer to inpatient Psychiatry facility.     Review of Systems   Constitutional: Negative for chills and fatigue.   HENT: Positive for drooling. Negative for dental problem.    Eyes: Negative for pain and discharge.   Respiratory: Negative for apnea, cough and chest tightness.    Cardiovascular: Negative for chest pain.   Gastrointestinal: Negative for abdominal distention and abdominal pain.   Genitourinary: Negative for enuresis and flank pain.   Musculoskeletal: Negative for gait problem and joint swelling.   Neurological: Negative for seizures and syncope.   Psychiatric/Behavioral: Positive for agitation, behavioral problems and suicidal ideas. The patient is nervous/anxious.      Objective:     Vital Signs (Most Recent):  Temp: 98.4 °F (36.9 °C) (01/01/18 1617)  Pulse: 78 (01/01/18 1617)  Resp: 18 (01/01/18 1617)  BP: (!) 166/82 (01/01/18 1617)  SpO2: 99 % (01/01/18 1617) Vital Signs (24h Range):  Temp:  [97.8 °F (36.6 °C)-98.4 °F (36.9 °C)] 98.4 °F (36.9 °C)  Pulse:  [55-78] 78  Resp:  [18-19] 18  SpO2:  [98 %-100 %] 99 %  BP: (122-176)/(74-85) 166/82     Weight: 63.5 kg (139 lb 15.9 oz)  Body mass index is 24.03 kg/m².  No intake or output data in the 24 hours ending 01/01/18 1642   Physical Exam   Constitutional: She appears well-developed and well-nourished.   HENT:   Head: Normocephalic.   Mouth/Throat: No oropharyngeal exudate.   Eyes: No scleral icterus.   Neck: Normal range of motion. No thyromegaly present.   Cardiovascular: Normal rate, regular rhythm, normal heart sounds and intact distal pulses.  Exam reveals no gallop and no friction rub.    No murmur heard.  Pulmonary/Chest: Effort normal and breath sounds normal. No respiratory distress. She has no wheezes. She exhibits no tenderness.   Abdominal: Soft. She exhibits no distension. There is no tenderness.    Musculoskeletal:   Patient with right AKA   Skin: She is not diaphoretic.       Significant Labs:   CBC:     Recent Labs  Lab 12/31/17  0355 01/01/18  0550   WBC 9.13 7.02   HGB 11.2* 12.1   HCT 32.4* 35.0*    357*     CMP:     Recent Labs  Lab 12/31/17  0355 01/01/18  0550    140   K 3.5 3.6    108   CO2 23 24   GLU 77 93   BUN 12 12   CREATININE 0.7 0.8   CALCIUM 9.5 10.0   PROT 6.7 7.4   ALBUMIN 3.3* 3.7   BILITOT 0.2 0.4   ALKPHOS 103 106   AST 25 21   ALT 29 27   ANIONGAP 9 8   EGFRNONAA >60.0 >60.0     Coagulation:     Recent Labs  Lab 01/01/18  0550   INR 1.1     Lactic Acid: No results for input(s): LACTATE in the last 48 hours.  Lipase: No results for input(s): LIPASE in the last 48 hours.  Magnesium:     Recent Labs  Lab 12/31/17  0355 01/01/18  0550   MG 1.7 2.0       Significant Imaging: I have reviewed and interpreted all pertinent imaging results/findings within the past 24 hours.

## 2018-01-01 NOTE — SUBJECTIVE & OBJECTIVE
Interval History: Patient seen and examined at bedside,seen by Psychiatry today who recommended restarting   Zoloft, Remeron, Lamictal and Seroquel. Patient still complains of diffuse pain especially of phantom limb.     Review of Systems   Constitutional: Negative for chills and fatigue.   HENT: Positive for drooling. Negative for dental problem.    Eyes: Negative for pain and discharge.   Respiratory: Negative for apnea, cough and chest tightness.    Cardiovascular: Negative for chest pain.   Gastrointestinal: Negative for abdominal distention and abdominal pain.   Genitourinary: Negative for enuresis and flank pain.   Musculoskeletal: Negative for gait problem and joint swelling.   Neurological: Negative for seizures and syncope.   Psychiatric/Behavioral: Positive for agitation, behavioral problems and suicidal ideas. The patient is nervous/anxious.      Objective:     Vital Signs (Most Recent):  Temp: 97.4 °F (36.3 °C) (12/31/17 1612)  Pulse: (!) 58 (12/31/17 1612)  Resp: 17 (12/31/17 1612)  BP: (!) 148/92 (12/31/17 1612)  SpO2: 100 % (12/31/17 1612) Vital Signs (24h Range):  Temp:  [97.4 °F (36.3 °C)-98.6 °F (37 °C)] 97.4 °F (36.3 °C)  Pulse:  [58-80] 58  Resp:  [15-19] 17  SpO2:  [96 %-100 %] 100 %  BP: (115-148)/(65-92) 148/92     Weight: 63.5 kg (139 lb 15.9 oz)  Body mass index is 24.03 kg/m².  No intake or output data in the 24 hours ending 12/31/17 1835   Physical Exam   Constitutional: She appears well-developed and well-nourished.   HENT:   Head: Normocephalic.   Mouth/Throat: No oropharyngeal exudate.   Eyes: No scleral icterus.   Neck: Normal range of motion. No thyromegaly present.   Cardiovascular: Normal rate, regular rhythm, normal heart sounds and intact distal pulses.  Exam reveals no gallop and no friction rub.    No murmur heard.  Pulmonary/Chest: Effort normal and breath sounds normal. No respiratory distress. She has no wheezes. She exhibits no tenderness.   Abdominal: Soft. She exhibits no  distension. There is no tenderness.   Musculoskeletal:   Patient with right AKA   Skin: She is not diaphoretic.       Significant Labs:   CBC:     Recent Labs  Lab 12/30/17 0446 12/31/17  0355   WBC 11.53 9.13   HGB 12.0 11.2*   HCT 34.7* 32.4*    313     CMP:     Recent Labs  Lab 12/29/17  2149 12/30/17 0446 12/31/17  0355    142 140   K 2.6* 3.1* 3.5    104 108   CO2 29 29 23   GLU 98 75 77   BUN 5* 5* 12   CREATININE 0.7 0.7 0.7   CALCIUM 9.3 9.3 9.5   PROT 7.0 6.7 6.7   ALBUMIN 3.3* 3.2* 3.3*   BILITOT 0.3 0.3 0.2   ALKPHOS 111 108 103   AST 36 34 25   ALT 38 36 29   ANIONGAP 10 9 9   EGFRNONAA >60.0 >60.0 >60.0     Coagulation:     Recent Labs  Lab 12/31/17 0355   INR 1.1     Lactic Acid: No results for input(s): LACTATE in the last 48 hours.  Lipase: No results for input(s): LIPASE in the last 48 hours.  Magnesium:     Recent Labs  Lab 12/30/17 0446 12/31/17  0355   MG 1.6 1.7       Significant Imaging: I have reviewed and interpreted all pertinent imaging results/findings within the past 24 hours.

## 2018-01-01 NOTE — PT/OT/SLP EVAL
Physical Therapy Evaluation and Discharge Note    Patient Name:  Val Gil   MRN:  [id number]    Recommendations:     Discharge Recommendations:   (no PT needs)   Discharge Equipment Recommendations: none   Barriers to discharge: None    Assessment:     Val Gil is a 37 y.o. female admitted with a medical diagnosis of Suicide attempt by multiple drug overdose. .  At this time, patient is functioning at their prior level of function and does not require further acute PT services.     Recent Surgery: * No surgery found *      Plan:     During this hospitalization, patient does not require further acute PT services.  Please re-consult if situation changes.     Plan of Care Reviewed with: patient, mother    Subjective     Communicated with RN prior to session.  Patient found supine upon PT entry to room, agreeable to evaluation.      Chief Complaint: just waking up  Patient comments/goals: none   Pain/Comfort:  · Pain Rating 1: 0/10  · Pain Rating Post-Intervention 1: 0/10    Patients cultural, spiritual, Sikh conflicts given the current situation: none stated    Living Environment:  Patient lives with family in 1-story home with 3 JENARO bilateral hand rails.  Prior to admission, patients level of function was Indep with crutches and wheelchair for community mobility.  Patient has the following equipment: crutches, axillary, wheelchair.  DME owned (not currently used): none.  Upon discharge, patient will have assistance from family.    Objective:     Patient found with: telemetry (sitter and mother present; patient removed telemetry prior to sitting EOB)     General Precautions: Standard, fall   Orthopedic Precautions:N/A   Braces: N/A     Exams:  · Cognitive Exam:  Patient is oriented to Person, Place, Time and Situation and follows 100% of multi-step commands   · Gross Motor Coordination:  WFL  · Postural Exam:  Patient presented with the following abnormalities:    · -       No postural  abnormalities identified  · Sensation: -       Intact  light/touch BLE  · Skin Integrity/Edema:  -       Skin integrity: Visible skin intact  · RLE ROM: NT; R AKA  · RLE Strength: NT R AKA  · LLE ROM: WFL  · LLE Strength: WFL    Functional Mobility:  · Bed Mobility:  Rolling Left:  independence  · Rolling Right: independence  · Supine to Sit: independence  · Sit to Supine: independence  · Transfers:  Sit to Stand:  modified independence with axillary crutches  · Gait: 40ftx2 using axillary crutches Mod I. Increased maegan decreasing safety. Patient verbalized understanding to slow down mobility with crutches to increase safety  · Balance: minor LOB with ambulation secondary to chair being in way during turn; however, able to self correct  · Stairs:  Pt ascended/descended 3 stair(s) with Axillary crutches with left handrail with Supervision or Set-up Assistance.     AM-PAC 6 CLICK MOBILITY  Total Score:24       Therapeutic Activities and Exercises:   Patient educated on:   - role of PT/POC    - safety with all functional mobility   - gait training with axillary crutches   - stair gait training with axillary crutches and L handrail.   - importance of participation with therapy services   - safest to ambulate with axillary crutches    Verbalized understanding of all education provided.    Increased maegan decreasing safety. Patient aware and verbalizes understanding.     No further skilled acute PT services medically necessary. Safe to DC from acute PT services at this time. Indep with axillary crutches or wheelchair for mobility.    Patient left supine with all lines intact, call button in reach, RN notified and family and sitter present.    GOALS:    Physical Therapy Goals     Not on file                History:     Past Medical History:   Diagnosis Date    Angiosarcoma     Angiosarcoma     Anxiety     Bone cancer     Depression        Past Surgical History:   Procedure Laterality Date    LEG AMPUTATION AT  HIP Left 7/2015       Clinical Decision Making:     History  Co-morbidities and personal factors that may impact the plan of care Examination  Body Structures and Functions, activity limitations and participation restrictions that may impact the plan of care Clinical Presentation   Decision Making/ Complexity Score   Co-morbidities:   [] Time since onset of injury / illness / exacerbation  [] Status of current condition  [x]Patient's cognitive status and safety concerns    [x] Multiple Medical Problems (see med hx)  Personal Factors:   [] Patient's age  [] Prior Level of function   [] Patient's home situation (environment and family support)  [] Patient's level of motivation  [] Expected progression of patient      HISTORY:(criteria)    [] 74864 - no personal factors/history    [x] 33159 - has 1-2 personal factor/comorbidity     [] 24084 - has >3 personal factor/comorbidity     Body Regions:  [] Objective examination findings  [] Head     []  Neck  [] Trunk   [] Upper Extremity  [] Lower Extremity    Body Systems:  [x] For communication ability, affect, cognition, language, and learning style: the assessment of the ability to make needs known, consciousness, orientation (person, place, and time), expected emotional /behavioral responses, and learning preferences (eg, learning barriers, education  needs)  [] For the neuromuscular system: a general assessment of gross coordinated movement (eg, balance, gait, locomotion, transfers, and transitions) and motor function  (motor control and motor learning)  [] For the musculoskeletal system: the assessment of gross symmetry, gross range of motion, gross strength, height, and weight  [] For the integumentary system: the assessment of pliability(texture), presence of scar formation, skin color, and skin integrity  [] For cardiovascular/pulmonary system: the assessment of heart rate, respiratory rate, blood pressure, and edema     Activity limitations:    [x] Patient's  cognitive status and saf ety concerns          [] Status of current condition      [] Weight bearing restriction  [] Cardiopulmunary Restriction    Participation Restrictions:   [] Goals and goal agreement with the patient     [] Rehab potential (prognosis) and probable outcome      Examination of Body System: (criteria)    [x] 91777 - addressing 1-2 elements    [] 05925 - addressing a total of 3 or more elements     [] 97798 -  Addressing a total of 4 or more elements         Clinical Presentation: (criteria)  Stable - 62628     On examination of body system using standardized tests and measures patient presents with 1-2 elements from any of the following: body structures and functions, activity limitations, and/or participation restrictions.  Leading to a clinical presentation that is considered stable and/or uncomplicated                              Clinical Decision Making  (Eval Complexity):  Low- 47683     Time Tracking:     PT Received On: 01/01/18  PT Start Time: 1314     PT Stop Time: 1336  PT Total Time (min): 22 min     Billable Minutes: Evaluation 22      Sam Spicer III, PT  01/01/2018

## 2018-01-01 NOTE — PROGRESS NOTES
"[facility] Hospital Medicine  Progress Note    Patient Name: Val Gil  MRN: [id number]  Patient Class: IP- Inpatient   Admission Date: 12/28/2017  Length of Stay: 4 days  Attending Physician: David London MD  Primary Care Provider: [facility] Medicine Team: [facility]OSP MED X David London MD    Subjective:     Principal Problem:Suicide attempt by multiple drug overdose    HPI:  36 y/o woman who presents after a suicide attempt and subsequent drug overdose. Pt has an opioid addiction and thought she could cure herself by using methamphetamine.  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 She has taken a lot of pills however we are unsure as to all of what she has taken. EMS did not collect any bottles or pills. Per her mother, she does take Ambien. At the scene a bag of notes were found that said "I have put some toxic substance on blanks and sheets. Do not resuscitate me. This is my time." In these notes, speed and opiates were mentioned. Pt also stuffed notes into her clothing that said "do not resuscitate me." Her father did notice some vomiting earlier today. When her mother arrived at the scene, pt had slurred speech and was subjectively unresponsive. Pt is now being decontaminated.  Critical care was consulted after patient could not be aroused after initial evaluation in the ED. She was given one dose of Narcan, after which she began to awake. She still had slurred speech and would not engage in conversation or follow commands. Will call family to obtain more collateral.        Hospital Course:  Patient was admitted to the ICU for suicide attempt with drug overdose. After monitoring overnight patient was transferred to the floor. Bicarbonate drip started in the ICU but later discontinued. Antipsychotics held by Psychiatry consult.  Zoloft, Remeron, Lamictal and Seroquel,subsequently restarted. Psychiatry had long conversation " with patient and family at bedside, who is still tearful and frustrated as to the expected length of stay and the impending transfer to an inpatient Psychiatric facility. Patient is actively complaining of pain and discomfort.     Interval History: Patient seen and examined at bedside. Reports some mild improvement in symptoms but still reports pain is high and is requesting subutex therapy. Spoke with mother at bedside. Possible transfer to inpatient Psychiatry facility.     Review of Systems   Constitutional: Negative for chills and fatigue.   HENT: Positive for drooling. Negative for dental problem.    Eyes: Negative for pain and discharge.   Respiratory: Negative for apnea, cough and chest tightness.    Cardiovascular: Negative for chest pain.   Gastrointestinal: Negative for abdominal distention and abdominal pain.   Genitourinary: Negative for enuresis and flank pain.   Musculoskeletal: Negative for gait problem and joint swelling.   Neurological: Negative for seizures and syncope.   Psychiatric/Behavioral: Positive for agitation, behavioral problems and suicidal ideas. The patient is nervous/anxious.      Objective:     Vital Signs (Most Recent):  Temp: 98.4 °F (36.9 °C) (01/01/18 1617)  Pulse: 78 (01/01/18 1617)  Resp: 18 (01/01/18 1617)  BP: (!) 166/82 (01/01/18 1617)  SpO2: 99 % (01/01/18 1617) Vital Signs (24h Range):  Temp:  [97.8 °F (36.6 °C)-98.4 °F (36.9 °C)] 98.4 °F (36.9 °C)  Pulse:  [55-78] 78  Resp:  [18-19] 18  SpO2:  [98 %-100 %] 99 %  BP: (122-176)/(74-85) 166/82     Weight: 63.5 kg (139 lb 15.9 oz)  Body mass index is 24.03 kg/m².  No intake or output data in the 24 hours ending 01/01/18 1642   Physical Exam   Constitutional: She appears well-developed and well-nourished.   HENT:   Head: Normocephalic.   Mouth/Throat: No oropharyngeal exudate.   Eyes: No scleral icterus.   Neck: Normal range of motion. No thyromegaly present.   Cardiovascular: Normal rate, regular rhythm, normal heart sounds  and intact distal pulses.  Exam reveals no gallop and no friction rub.    No murmur heard.  Pulmonary/Chest: Effort normal and breath sounds normal. No respiratory distress. She has no wheezes. She exhibits no tenderness.   Abdominal: Soft. She exhibits no distension. There is no tenderness.   Musculoskeletal:   Patient with right AKA   Skin: She is not diaphoretic.       Significant Labs:   CBC:     Recent Labs  Lab 12/31/17  0355 01/01/18  0550   WBC 9.13 7.02   HGB 11.2* 12.1   HCT 32.4* 35.0*    357*     CMP:     Recent Labs  Lab 12/31/17  0355 01/01/18  0550    140   K 3.5 3.6    108   CO2 23 24   GLU 77 93   BUN 12 12   CREATININE 0.7 0.8   CALCIUM 9.5 10.0   PROT 6.7 7.4   ALBUMIN 3.3* 3.7   BILITOT 0.2 0.4   ALKPHOS 103 106   AST 25 21   ALT 29 27   ANIONGAP 9 8   EGFRNONAA >60.0 >60.0     Coagulation:     Recent Labs  Lab 01/01/18  0550   INR 1.1     Lactic Acid: No results for input(s): LACTATE in the last 48 hours.  Lipase: No results for input(s): LIPASE in the last 48 hours.  Magnesium:     Recent Labs  Lab 12/31/17  0355 01/01/18  0550   MG 1.7 2.0       Significant Imaging: I have reviewed and interpreted all pertinent imaging results/findings within the past 24 hours.    Assessment/Plan:      * Suicide attempt by multiple drug overdose    -Patient ingested unknown amount as well as identity of multiple substances  -UDS positive for benzos and opiates  -Will contact family for more information  -Patient currently PEC'd  -Psychiatry consulted  -Patient's home meds include amphetamine salt, clonidine, flexaril, cyproheptadine, valium, fentanyl, lamictal, ativan, remeron, zofran, oxycodone, lyrica, seroquil, and zoloft  -Salicylate level <5.0  -Acetaminophen level normal  - Addiction Psychiatry consulted   - Awaiting placement to Inpatient Psychiatry unit, will need PT/Ot prior   -Patient started on NAC, will continue to trend acetominophen levels q 12 hours  -Liver enzymes not  elevated  - daily TCA levels normal   -Spoke to patient's father who is unfamiliar with patient's medications, however he gave us mother's number. When we tried to contact mother, number was disconnected. Also attempted to contact patient's cell phone, but this number was also disconnected   -EKG showed sinus tachycardia,   -Review of  shows regular prescriptions for oxycodone 20 mg dispense 360 tablets q 30 days, valium 10 mg dispense 30 tablets q 30 days, lorazepam 1 mg dispense 90 tablets q 30 days, fentanyl patches as well. This goes as far back as 1 calendar year.  - Restart  Zoloft, Remeron, Lamictal and Seroquel on 12/31/17        Encephalopathy, toxic    Resolved           Tricyclic overdose, intentional self-harm, initial encounter     >500.0  312.5  TCA Scrn   On Bicarbonate drip while in the ICU, discontinued due to metabolic alkalosis             Acetaminophen overdose, intentional self-harm, initial encounter    Tylenol levels normal, 21 on admission          Benzodiazepine dependence    Ativan PRN          Leucocytosis    Resolved           Anxiety    -Patient known to take benzodiazepenes for anxiety  -Will ensure seizure precautions as well as frequent neuro checks  -Will order ativan prn for withdrawals         Opioid dependence with intoxication    -Patient presented in obtunded state with pinpoint pupils, GCS <8  -Upon administration of Narcan, patient began to wake up, GCS of 9, currently with nasal trumpet tolerating well  - avoid opioids for now  - treat withdrawal symptomatically with clonidine prn        Impaired mobility and ADLs    -Will get PT and OT to evaluate patient          VTE Risk Mitigation         Ordered     enoxaparin injection 40 mg  Daily     Route:  Subcutaneous        12/29/17 0051     Medium Risk of VTE  Once      12/29/17 0102     Place sequential compression device  Until discontinued      12/29/17 0102              David London MD  Department of Hospital  Medicine   [facility]

## 2018-01-01 NOTE — PROGRESS NOTES
PEC/CEC patient resting in bed with sitter at bedside.  Q 15 minute rounds sheet reviewed.  Spoke with arianna De Souza who stated no safety concerns at this time.  Family member asked for patient to go to inpatient psych.  Instructed family member to speak with the team doctor or the nurse on the floor.  Environmental safety rounds completed.

## 2018-01-01 NOTE — ASSESSMENT & PLAN NOTE
-Patient presented in obtunded state with pinpoint pupils, GCS <8  -Upon administration of Narcan, patient began to wake up, GCS of 9, currently with nasal trumpet tolerating well  - avoid opioids for now  - treat withdrawal symptomatically with clonidine prn

## 2018-01-02 LAB
ALBUMIN SERPL BCP-MCNC: 3.6 G/DL
ALP SERPL-CCNC: 105 U/L
ALT SERPL W/O P-5'-P-CCNC: 37 U/L
ANION GAP SERPL CALC-SCNC: 8 MMOL/L
APAP SERPL-MCNC: <3 UG/ML
AST SERPL-CCNC: 33 U/L
BASOPHILS # BLD AUTO: 0.03 K/UL
BASOPHILS NFR BLD: 0.4 %
BILIRUB SERPL-MCNC: 0.3 MG/DL
BUN SERPL-MCNC: 12 MG/DL
CALCIUM SERPL-MCNC: 9.5 MG/DL
CHLORIDE SERPL-SCNC: 106 MMOL/L
CO2 SERPL-SCNC: 25 MMOL/L
CREAT SERPL-MCNC: 0.7 MG/DL
DIFFERENTIAL METHOD: ABNORMAL
EOSINOPHIL # BLD AUTO: 0.1 K/UL
EOSINOPHIL NFR BLD: 1.4 %
ERYTHROCYTE [DISTWIDTH] IN BLOOD BY AUTOMATED COUNT: 11.9 %
EST. GFR  (AFRICAN AMERICAN): >60 ML/MIN/1.73 M^2
EST. GFR  (NON AFRICAN AMERICAN): >60 ML/MIN/1.73 M^2
GLUCOSE SERPL-MCNC: 87 MG/DL
HCT VFR BLD AUTO: 34.1 %
HGB BLD-MCNC: 11.9 G/DL
IMM GRANULOCYTES # BLD AUTO: 0.09 K/UL
IMM GRANULOCYTES NFR BLD AUTO: 1.3 %
INR PPP: 1.1
LAMOTRIGINE SERPL-MCNC: 0.7 UG/ML (ref 2–15)
LYMPHOCYTES # BLD AUTO: 1.4 K/UL
LYMPHOCYTES NFR BLD: 20 %
MAGNESIUM SERPL-MCNC: 2.2 MG/DL
MCH RBC QN AUTO: 29.8 PG
MCHC RBC AUTO-ENTMCNC: 34.9 G/DL
MCV RBC AUTO: 85 FL
MONOCYTES # BLD AUTO: 0.6 K/UL
MONOCYTES NFR BLD: 8.4 %
NEUTROPHILS # BLD AUTO: 4.9 K/UL
NEUTROPHILS NFR BLD: 68.5 %
NRBC BLD-RTO: 0 /100 WBC
PHOSPHATE SERPL-MCNC: 3.2 MG/DL
PLATELET # BLD AUTO: 324 K/UL
PMV BLD AUTO: 10.1 FL
POTASSIUM SERPL-SCNC: 3.3 MMOL/L
PROT SERPL-MCNC: 7.2 G/DL
PROTHROMBIN TIME: 11.3 SEC
RBC # BLD AUTO: 4 M/UL
SODIUM SERPL-SCNC: 139 MMOL/L
TRICYCLICS SERPL-MCNC: 211.5 NG/ML
WBC # BLD AUTO: 7.16 K/UL

## 2018-01-02 PROCEDURE — 25000003 PHARM REV CODE 250: Performed by: HOSPITALIST

## 2018-01-02 PROCEDURE — 80329 ANALGESICS NON-OPIOID 1 OR 2: CPT

## 2018-01-02 PROCEDURE — 93005 ELECTROCARDIOGRAM TRACING: CPT

## 2018-01-02 PROCEDURE — 80053 COMPREHEN METABOLIC PANEL: CPT

## 2018-01-02 PROCEDURE — 85025 COMPLETE CBC W/AUTO DIFF WBC: CPT

## 2018-01-02 PROCEDURE — 36415 COLL VENOUS BLD VENIPUNCTURE: CPT

## 2018-01-02 PROCEDURE — 99233 SBSQ HOSP IP/OBS HIGH 50: CPT | Mod: AF,HB,, | Performed by: PSYCHIATRY & NEUROLOGY

## 2018-01-02 PROCEDURE — S4991 NICOTINE PATCH NONLEGEND: HCPCS | Performed by: PHYSICIAN ASSISTANT

## 2018-01-02 PROCEDURE — 99233 SBSQ HOSP IP/OBS HIGH 50: CPT | Mod: ,,, | Performed by: HOSPITALIST

## 2018-01-02 PROCEDURE — 25000003 PHARM REV CODE 250: Performed by: PSYCHIATRY & NEUROLOGY

## 2018-01-02 PROCEDURE — 85610 PROTHROMBIN TIME: CPT

## 2018-01-02 PROCEDURE — 80307 DRUG TEST PRSMV CHEM ANLYZR: CPT

## 2018-01-02 PROCEDURE — 93010 ELECTROCARDIOGRAM REPORT: CPT | Mod: ,,, | Performed by: INTERNAL MEDICINE

## 2018-01-02 PROCEDURE — 84100 ASSAY OF PHOSPHORUS: CPT

## 2018-01-02 PROCEDURE — 11000001 HC ACUTE MED/SURG PRIVATE ROOM

## 2018-01-02 PROCEDURE — 25000003 PHARM REV CODE 250: Performed by: PHYSICIAN ASSISTANT

## 2018-01-02 PROCEDURE — 83735 ASSAY OF MAGNESIUM: CPT

## 2018-01-02 PROCEDURE — 63600175 PHARM REV CODE 636 W HCPCS: Performed by: STUDENT IN AN ORGANIZED HEALTH CARE EDUCATION/TRAINING PROGRAM

## 2018-01-02 RX ORDER — IBUPROFEN 200 MG
1 TABLET ORAL DAILY
Status: DISCONTINUED | OUTPATIENT
Start: 2018-01-02 | End: 2018-01-04 | Stop reason: HOSPADM

## 2018-01-02 RX ORDER — POLYETHYLENE GLYCOL 3350 17 G/17G
17 POWDER, FOR SOLUTION ORAL 2 TIMES DAILY PRN
Status: DISCONTINUED | OUTPATIENT
Start: 2018-01-02 | End: 2018-01-04 | Stop reason: HOSPADM

## 2018-01-02 RX ORDER — LACTULOSE 10 G/15ML
200 SOLUTION ORAL; RECTAL ONCE
Status: COMPLETED | OUTPATIENT
Start: 2018-01-02 | End: 2018-01-02

## 2018-01-02 RX ADMIN — NICOTINE 1 PATCH: 21 PATCH, EXTENDED RELEASE TRANSDERMAL at 08:01

## 2018-01-02 RX ADMIN — LAMOTRIGINE 25 MG: 25 TABLET ORAL at 11:01

## 2018-01-02 RX ADMIN — POLYETHYLENE GLYCOL 3350 17 G: 17 POWDER, FOR SOLUTION ORAL at 02:01

## 2018-01-02 RX ADMIN — LORAZEPAM 2 MG: 1 TABLET ORAL at 02:01

## 2018-01-02 RX ADMIN — PROMETHAZINE HYDROCHLORIDE 12.5 MG: 12.5 TABLET ORAL at 10:01

## 2018-01-02 RX ADMIN — LORAZEPAM 2 MG: 1 TABLET ORAL at 10:01

## 2018-01-02 RX ADMIN — MIRTAZAPINE 30 MG: 30 TABLET, FILM COATED ORAL at 08:01

## 2018-01-02 RX ADMIN — QUETIAPINE FUMARATE 100 MG: 100 TABLET, FILM COATED ORAL at 08:01

## 2018-01-02 RX ADMIN — LACTULOSE 200 G: 10 SOLUTION ORAL at 05:01

## 2018-01-02 RX ADMIN — IBUPROFEN 600 MG: 600 TABLET, FILM COATED ORAL at 08:01

## 2018-01-02 RX ADMIN — LORAZEPAM 2 MG: 1 TABLET ORAL at 08:01

## 2018-01-02 RX ADMIN — ENOXAPARIN SODIUM 40 MG: 100 INJECTION SUBCUTANEOUS at 05:01

## 2018-01-02 RX ADMIN — IBUPROFEN 600 MG: 600 TABLET, FILM COATED ORAL at 02:01

## 2018-01-02 NOTE — SUBJECTIVE & OBJECTIVE
Patient History           Medical as of 1/2/2018     Past Medical History     Diagnosis Date Comments Source    Angiosarcoma -- -- Provider    Angiosarcoma -- -- Provider    Anxiety -- -- Provider    Bone cancer -- -- Provider    Depression -- -- Provider          Pertinent Negatives     Diagnosis Date Noted Comments Source    Asthma 6/29/2015 -- Provider    COPD (chronic obstructive pulmonary disease) 6/29/2015 -- Provider    Diabetes mellitus 1/31/2015 -- Provider    Hypertension 6/29/2015 -- Provider                  Surgical as of 1/2/2018     Past Surgical History     Procedure Laterality Date Comments Source    LEG AMPUTATION AT HIP Left 7/2015 -- Provider                  Family as of 1/2/2018     Problem Relation Name Age of Onset Comments Source    Cancer Paternal Uncle great uncle -- lung Provider    Cancer Maternal Grandmother -- -- breast Provider    Heart attack Maternal Grandmother -- -- -- Provider    Arthritis Maternal Grandmother -- -- -- Provider    Heart attack Maternal Grandfather -- -- -- Provider    Arthritis Maternal Grandfather -- -- -- Provider    Diabetes Paternal Grandmother -- -- -- Provider    Hypertension Paternal Grandfather -- -- -- Provider    Heart attack Paternal Grandfather -- -- -- Provider    No Known Problems Father -- -- -- Provider            Tobacco Use as of 1/2/2018     Smoking Status Smoking Start Date Smoking Quit Date Packs/day Years Used    Former Smoker -- -- -- --    Types Comments Smokeless Tobacco Status Smokeless Tobacco Quit Date Source    -- -- Never Used -- Provider            Alcohol Use as of 1/2/2018     Alcohol Use Drinks/Week Alcohol/Week Comments Source    No -- -- -- Provider            Drug Use as of 1/2/2018     Drug Use Types Frequency Comments Source    No -- -- -- Provider            Sexual Activity as of 1/2/2018     Sexually Active Birth Control Partners Comments Source    Not Asked -- -- -- Provider            Activities of Daily Living as  of 1/2/2018    **None**           Social Documentation as of 1/2/2018    **None**           Occupational as of 1/2/2018    **None**           Socioeconomic as of 1/2/2018     Marital Status Spouse Name Number of Children Years Education Preferred Language Ethnicity Race Source    Single -- -- -- English /White White --         Pertinent History Q A Comments    as of 1/2/2018 Lives with      Place in Birth Order      Lives in      Number of Siblings      Raised by      Legal Involvement      Childhood Trauma      Criminal History of      Financial Status      Highest Level of Education      Does patient have access to a firearm?       Service      Primary Leisure Activity      Spirituality       Past Medical History:   Diagnosis Date    Angiosarcoma     Angiosarcoma     Anxiety     Bone cancer     Depression      Past Surgical History:   Procedure Laterality Date    LEG AMPUTATION AT HIP Left 7/2015     Family History     Problem Relation (Age of Onset)    Arthritis Maternal Grandmother, Maternal Grandfather    Cancer Paternal Uncle, Maternal Grandmother    Diabetes Paternal Grandmother    Heart attack Maternal Grandmother, Maternal Grandfather, Paternal Grandfather    Hypertension Paternal Grandfather    No Known Problems Father        Social History Main Topics    Smoking status: Former Smoker    Smokeless tobacco: Never Used    Alcohol use No    Drug use: No    Sexual activity: Not on file     Review of patient's allergies indicates:   Allergen Reactions    Phenergan [promethazine] Anxiety     Pt reports that she can take po phenergan without any reaction.        No current facility-administered medications on file prior to encounter.      Current Outpatient Prescriptions on File Prior to Encounter   Medication Sig    AMPHETAMINE SALT COMBO 15 MG tablet 30 mg.     cloNIDine (CATAPRES) 0.1 MG tablet Take 1 tablet (0.1 mg total) by mouth 3 (three) times daily as needed (withdrawal).     cyclobenzaprine (FLEXERIL) 10 MG tablet     cyproheptadine (PERIACTIN) 4 mg tablet     diazepam (VALIUM) 5 MG tablet 10 mg.     fentanyl (DURAGESIC) 12 mcg/hr PT72     fentaNYL (DURAGESIC) 25 mcg/hr     lamotrigine (LAMICTAL) 25 MG tablet Take 2 tablets (50 mg total) by mouth once daily.    lorazepam (ATIVAN) 0.5 MG tablet Take 0.5 mg by mouth 2 (two) times daily.     lorazepam (ATIVAN) 1 MG tablet     mirtazapine (REMERON) 45 MG tablet Take 1 tablet (45 mg total) by mouth every evening.    ondansetron (ZOFRAN) 8 MG tablet Take 1 tablet (8 mg total) by mouth every 6 (six) hours as needed.    oxycodone (ROXICODONE) 10 mg Tab immediate release tablet Take 1-2 tablets (10-20 mg total) by mouth every 6 (six) hours as needed for Pain.    oxycodone (ROXICODONE) 5 MG immediate release tablet Take 2 tablets (10 mg total) by mouth every 6 (six) hours as needed for Pain.    pregabalin (LYRICA) 50 MG capsule Take 1 capsule (50 mg total) by mouth once daily.    promethazine (PHENERGAN) 25 MG tablet     quetiapine (SEROQUEL) 100 MG Tab Take 1 tablet (100 mg total) by mouth every evening.    senna-docusate 8.6-50 mg (PERICOLACE) 8.6-50 mg per tablet Take 2 tablets by mouth 2 (two) times daily.    sertraline (ZOLOFT) 50 MG tablet     sucralfate (CARAFATE) 1 gram tablet Take 1 g by mouth 4 (four) times daily.     Psychotherapeutics     Start     Stop Route Frequency Ordered    01/01/18 2100  mirtazapine tablet 30 mg      -- Oral Nightly 01/01/18 1904    01/01/18 0900  sertraline tablet 50 mg      -- Oral Daily 12/31/17 1538    12/31/17 2100  QUEtiapine tablet 100 mg      -- Oral Nightly 12/31/17 1538 12/30/17 2207  LORazepam tablet 2 mg      -- Oral Every 6 hours PRN 12/30/17 2107        Review of Systems    Strengths and Liabilities: Strength: Patient is expressive/articulate., Liability: Patient is defensive., Liability: Patient lacks coping skills.    Objective:     Vital Signs (Most Recent):  Temp: 97.6  "°F (36.4 °C) (01/02/18 1545)  Pulse: 95 (01/02/18 1545)  Resp: 18 (01/02/18 1545)  BP: (!) 147/91 (01/02/18 1545)  SpO2: 97 % (01/02/18 1545) Vital Signs (24h Range):  Temp:  [97.6 °F (36.4 °C)-98.5 °F (36.9 °C)] 97.6 °F (36.4 °C)  Pulse:  [] 95  Resp:  [16-18] 18  SpO2:  [97 %-99 %] 97 %  BP: (132-152)/(82-91) 147/91     Height: 5' 4" (162.6 cm)  Weight: 63.5 kg (139 lb 15.9 oz)  Body mass index is 24.03 kg/m².      Intake/Output Summary (Last 24 hours) at 01/02/18 1706  Last data filed at 01/02/18 0600   Gross per 24 hour   Intake              240 ml   Output                0 ml   Net              240 ml       Physical Exam     Appearance: AKA, fair grooming/hygeine wearing hospital gown in NAD; diaphoretic, dilated pupils, tremulous  Behavior: somewhat agitated, defensive, minimizing  Speech: normal rate, tone, and volume  Mood: depressed  Affect: anxious  Thought Process: linear  Thought Perceptions: denied AVH  Thought Content: denied SI, HI; no delusions apparent  Sensorium: awake, alert  Attention/Concentration: intact to conversation  Orientation: person, place, time, and situation  Memory: intact (recent, remote)  Abstraction: intact   Insight: poor  Judgment: good         Significant Labs:   Recent Results (from the past 48 hour(s))   Comprehensive metabolic panel    Collection Time: 01/01/18  5:50 AM   Result Value Ref Range    Sodium 140 136 - 145 mmol/L    Potassium 3.6 3.5 - 5.1 mmol/L    Chloride 108 95 - 110 mmol/L    CO2 24 23 - 29 mmol/L    Glucose 93 70 - 110 mg/dL    BUN, Bld 12 6 - 20 mg/dL    Creatinine 0.8 0.5 - 1.4 mg/dL    Calcium 10.0 8.7 - 10.5 mg/dL    Total Protein 7.4 6.0 - 8.4 g/dL    Albumin 3.7 3.5 - 5.2 g/dL    Total Bilirubin 0.4 0.1 - 1.0 mg/dL    Alkaline Phosphatase 106 55 - 135 U/L    AST 21 10 - 40 U/L    ALT 27 10 - 44 U/L    Anion Gap 8 8 - 16 mmol/L    eGFR if African American >60.0 >60 mL/min/1.73 m^2    eGFR if non African American >60.0 >60 mL/min/1.73 m^2 "   Magnesium    Collection Time: 01/01/18  5:50 AM   Result Value Ref Range    Magnesium 2.0 1.6 - 2.6 mg/dL   Phosphorus    Collection Time: 01/01/18  5:50 AM   Result Value Ref Range    Phosphorus 2.6 (L) 2.7 - 4.5 mg/dL   CBC auto differential    Collection Time: 01/01/18  5:50 AM   Result Value Ref Range    WBC 7.02 3.90 - 12.70 K/uL    RBC 4.11 4.00 - 5.40 M/uL    Hemoglobin 12.1 12.0 - 16.0 g/dL    Hematocrit 35.0 (L) 37.0 - 48.5 %    MCV 85 82 - 98 fL    MCH 29.4 27.0 - 31.0 pg    MCHC 34.6 32.0 - 36.0 g/dL    RDW 11.9 11.5 - 14.5 %    Platelets 357 (H) 150 - 350 K/uL    MPV 10.2 9.2 - 12.9 fL    Immature Granulocytes 0.9 (H) 0.0 - 0.5 %    Gran # 4.8 1.8 - 7.7 K/uL    Immature Grans (Abs) 0.06 (H) 0.00 - 0.04 K/uL    Lymph # 1.7 1.0 - 4.8 K/uL    Mono # 0.4 0.3 - 1.0 K/uL    Eos # 0.1 0.0 - 0.5 K/uL    Baso # 0.02 0.00 - 0.20 K/uL    nRBC 0 0 /100 WBC    Gran% 68.0 38.0 - 73.0 %    Lymph% 23.5 18.0 - 48.0 %    Mono% 5.4 4.0 - 15.0 %    Eosinophil% 1.9 0.0 - 8.0 %    Basophil% 0.3 0.0 - 1.9 %    Differential Method Automated    Acetaminophen level    Collection Time: 01/01/18  5:50 AM   Result Value Ref Range    Acetaminophen (Tylenol), Serum <3.0 (L) 10.0 - 20.0 ug/mL   Protime-INR    Collection Time: 01/01/18  5:50 AM   Result Value Ref Range    Prothrombin Time 11.4 9.0 - 12.5 sec    INR 1.1 0.8 - 1.2   Tricyclic Screen, Serum    Collection Time: 01/01/18  5:50 AM   Result Value Ref Range    TCA Scrn 232.1 See Text ng/mL   Acetaminophen level    Collection Time: 01/01/18  4:45 PM   Result Value Ref Range    Acetaminophen (Tylenol), Serum <3.0 (L) 10.0 - 20.0 ug/mL   Comprehensive metabolic panel    Collection Time: 01/02/18  4:19 AM   Result Value Ref Range    Sodium 139 136 - 145 mmol/L    Potassium 3.3 (L) 3.5 - 5.1 mmol/L    Chloride 106 95 - 110 mmol/L    CO2 25 23 - 29 mmol/L    Glucose 87 70 - 110 mg/dL    BUN, Bld 12 6 - 20 mg/dL    Creatinine 0.7 0.5 - 1.4 mg/dL    Calcium 9.5 8.7 - 10.5 mg/dL     Total Protein 7.2 6.0 - 8.4 g/dL    Albumin 3.6 3.5 - 5.2 g/dL    Total Bilirubin 0.3 0.1 - 1.0 mg/dL    Alkaline Phosphatase 105 55 - 135 U/L    AST 33 10 - 40 U/L    ALT 37 10 - 44 U/L    Anion Gap 8 8 - 16 mmol/L    eGFR if African American >60.0 >60 mL/min/1.73 m^2    eGFR if non African American >60.0 >60 mL/min/1.73 m^2   Magnesium    Collection Time: 01/02/18  4:19 AM   Result Value Ref Range    Magnesium 2.2 1.6 - 2.6 mg/dL   Phosphorus    Collection Time: 01/02/18  4:19 AM   Result Value Ref Range    Phosphorus 3.2 2.7 - 4.5 mg/dL   CBC auto differential    Collection Time: 01/02/18  4:19 AM   Result Value Ref Range    WBC 7.16 3.90 - 12.70 K/uL    RBC 4.00 4.00 - 5.40 M/uL    Hemoglobin 11.9 (L) 12.0 - 16.0 g/dL    Hematocrit 34.1 (L) 37.0 - 48.5 %    MCV 85 82 - 98 fL    MCH 29.8 27.0 - 31.0 pg    MCHC 34.9 32.0 - 36.0 g/dL    RDW 11.9 11.5 - 14.5 %    Platelets 324 150 - 350 K/uL    MPV 10.1 9.2 - 12.9 fL    Immature Granulocytes 1.3 (H) 0.0 - 0.5 %    Gran # 4.9 1.8 - 7.7 K/uL    Immature Grans (Abs) 0.09 (H) 0.00 - 0.04 K/uL    Lymph # 1.4 1.0 - 4.8 K/uL    Mono # 0.6 0.3 - 1.0 K/uL    Eos # 0.1 0.0 - 0.5 K/uL    Baso # 0.03 0.00 - 0.20 K/uL    nRBC 0 0 /100 WBC    Gran% 68.5 38.0 - 73.0 %    Lymph% 20.0 18.0 - 48.0 %    Mono% 8.4 4.0 - 15.0 %    Eosinophil% 1.4 0.0 - 8.0 %    Basophil% 0.4 0.0 - 1.9 %    Differential Method Automated    Acetaminophen level    Collection Time: 01/02/18  4:19 AM   Result Value Ref Range    Acetaminophen (Tylenol), Serum <3.0 (L) 10.0 - 20.0 ug/mL   Protime-INR    Collection Time: 01/02/18  4:19 AM   Result Value Ref Range    Prothrombin Time 11.3 9.0 - 12.5 sec    INR 1.1 0.8 - 1.2   Tricyclic Screen, Serum    Collection Time: 01/02/18  4:19 AM   Result Value Ref Range    TCA Scrn 211.5 See Text ng/mL      No results found for: PHENYTOIN, PHENOBARB, VALPROATE, CBMZ      Significant Imaging: I have reviewed all pertinent imaging results/findings within the past 24  hours.

## 2018-01-02 NOTE — CONSULTS
Inpatient consult to Psychiatry    Consult received and full note to follow shortly    Please contact ON CALL psychiatry service for any acute and urgent issues that may arise.

## 2018-01-02 NOTE — PROGRESS NOTES
"[facility]  Psychiatry  Progress Note    Patient Name: Val Gil  MRN: [id number]   Code Status: Full Code  Admission Date: 12/28/2017  Hospital Length of Stay: 4 days  Expected Discharge Date:   Attending Physician: David London MD  Primary Care Provider: [facility]    [place] [facility]    Patient information was obtained from patient.     Subjective:     Principal Problem:Suicide attempt by multiple drug overdose    Chief Complaint: Overdose, Request for Subutex    HPI: Val Gil is a 37 y.o. female with past psychiatric history of GLORIA, Depression and Adjustment d/o and PMH of Sarcoma s/p R AKA 2015 who was BIB EMS after intentional overdose in suicide attempt.    Per ED MD note:  History gained from EMS and from patient's mother and from a note that was left by the patient at bedside. Mother reports that patient was home during the day and did not know about patient states until she arrived home at 1800 hrs.  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  Additionally the mother reports here that she was on Percocets but that was eventually shifted to [place].  Her mother also the patient began using heroin in the last month approximately. Patient's note said that she was upset about her heroin addiction, that she had some "speed" that she intended to use as a treatment for her heroin addiction.  The noted initially stated that she applied some toxic substance on the day closed blankets sheets to keep staff away from her.  There was a note additionally under her shirt and said DO NOT RESUSCITATE me I want to die.  Bladder pages of her longer " "noted brittany stated that she asked her father to wash the sheets and blankets so that her dog would not be exposed to whatever toxin that she had spread in the room-there was certainly no further information about what this toxic substance was. EMS reported the presence of multiple police rescue at the bedside already concerned rightly for the problem of hazardous exposures from a possible toxin at bedside.  Patient was poorly responsive as noted requiring painful stimuli to get arousal - no verbal response was detected.     Critical care was consulted after patient could not be aroused after initial evaluation in the ED. Per their note, she was given one dose of Narcan in ED, after which she began to awake. She still had slurred speech and would not engage in conversation or follow commands. They "spoke to patient's father who is unfamiliar with patient's medications, however he gave mother's number. When we tried to contact mother, number was disconnected. Also attempted to contact patient's cell phone, but this number was also disconnected. Patient's home meds include amphetamine salt, clonidine, flexaril, cyproheptadine, valium, fentanyl, lamictal, ativan, remeron, zofran, oxycodone, lyrica, seroquil, and zoloft. Review of  shows regular prescriptions for oxycodone 20 mg dispense 360 tablets q 30 days, valium 10 mg dispense 30 tablets q 30 days, lorazepam 1 mg dispense 90 tablets q 30 days, fentanyl patches as well. This goes as far back as 1 calendar year."    On attempt to interview, patient sleeping on ED stretcher, unable to arouse with verbal stimuli or gentle leg shaking. No family present at bedside.    Per chart review, patient previously seen by psychiatry in 2015 for medication management during admission when she received AKA. She was on Lamictal, Remeron, Seroquel, Zoloft at that time; Zoloft was eventually changed to [place]. Ativan was used PRN anxiety. Patient did not require inpatient " "psychiatric hospitalization at that time.     Interval update 12/31/17: Pt agitated, shivering, pupils dilated, diaphoretic. Pt c/o opiate withdrawal at this time. She is anxious with irritable mood. Pt states multiple life stressors related to her cancer and AKA that have been building for past few years. Pt and her father were kicked out of their rental house ~1 month ago which was "the straw that broke the camel's back." Pt had been thinking of and planning her suicide for 1 week prior to the attempt. Pt becomes tearful many times during interview, and emotionally states "I didn't want to.. Life was just too much. I'm tired of living in pain." Pt may be unreliable, as she denies heroin use (despite admitting it in suicide note) and does not report accurate dose/frequency of her pain regimen. Pt takes Lamictal 75mg qd, Zoloft 100mg qd, Seroquel 100mg qhs, and Remeron 45mg qhs for her depression. Pt again becomes tearful at conclusion of interview, stating that she is "so tired and burnt out on life." Pt minimizes her suicide attempt and drug use and does not think she needs inpatient psych.    Collateral: mother Dilip Reis, [phone]. Rang once and went to voicemail.    Per psychiatry consult note 7/15/15, information will be updated as able:  Past Psychiatric History:   Previous Psychiatric Hospitalizations: No   Previous Medication Trials: Yes  Previous Suicide Attempts: No   History of Violence: No   Outpatient psychiatrist: Dr. 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  Patient and mother wished to discuss options for Subutex/Suboxone.  Patient was counseled that the addiction team could see her in morning to discuss all available options.  Patient states that she is interested in Subutex outpatient treatment for pain management, and was counseled that Psychiatrists use these medications as tools for opiate use disorder along with addiction treatment, not to treat pain.  Also counseled that Subutex outpatient treatment was unlikely.  Patient feels that pain management specialists are unattainable due to price and is not interested in addiction treatment, but was agreeable to meeting with the Addiction consult team tomorrow morning.  Patient was also dissatisfied that her psychotropic medications were restarted at low doses.  She feels her depression and anxiety are "out of control" and slept poorly last night.      No new subjective & objective note has been filed under this hospital service since the last note was generated.    Assessment/Plan:     * Suicide attempt by multiple drug overdose    - Agree with PEC in setting of deliberate suicide attempt, will seek inpatient placement once medically cleared, currently being admitted from ED to ICU. At this time concern for patient developing possible serotonin syndrome, NMS, tylenol toxicity and TCA toxicity. Overdose on Seroquel also likely given prolonged Qtc (since ED arrival 453 -> 497).  - Per her suicide note recent use of heroin and methamphetamines, UDS + opiates and benzos  - Home regimen per ICU team amphetamine salts, clonidine, flexaril, cyproheptadine, valium, fentanyl, lamictal, ativan, remeron, " zofran, oxycodone, lyrica, seroquel, and zoloft.   - Per primary team: Review of  shows regular prescriptions for oxycodone 20 mg dispense 360 tablets q 30 days, valium 10 mg dispense 30 tablets q 30 days, lorazepam 1 mg dispense 90 tablets q 30 days, fentanyl patches as well. This goes as far back as 1 calendar year.  - Continue home psychotropic medications, increase Remeron to 30 mg po qhs for mood and sleep, and to gain therapeutic alliance with the patient.   - May use Ativan 2mg PO or IM q6 hours PRN severe anxiety; would avoid using antipsychotics     Plan:   - Pt in acute opiate withdrawal at this time  - Will discuss initiation of subutex taper detox vs. Continued conservative management.  Addiction Psychiatry to meet with patient tomorrow to .   - Continue PEC/CEC for danger to self and transfer to inpatient psych once medically cleared                 Need for Continued Hospitalization:   Requires ongoing hospitalization for stabilization of medications.    Anticipated Disposition: Admitted as an Inpatient     Total time:  25 with greater than 50% of this time spent in counseling and/or coordination of care.       Janae Roman MD   Psychiatry  [facility]

## 2018-01-02 NOTE — ASSESSMENT & PLAN NOTE
-Patient ingested unknown amount as well as identity of multiple substances  -UDS positive for benzos and opiates  -Will contact family for more information  -Patient currently PEC'd  -Psychiatry consulted  -Patient's home meds include amphetamine salt, clonidine, flexaril, cyproheptadine, valium, fentanyl, lamictal, ativan, remeron, zofran, oxycodone, lyrica, seroquil, and zoloft  -Salicylate level <5.0  -Acetaminophen level 21->33  -Patient started on NAC, will continue to trend acetominophen levels q 12 hours  -Liver enzymes not elevated  -Will obtain lamotrigine and TCA level   -Spoke to patient's father who is unfamiliar with patient's medications, however he gave us mother's number. When we tried to contact mother, number was disconnected. Also attempted to contact patient's cell phone, but this number was also disconnected   -EKG showed sinus tachycardia,   -Review of  shows regular prescriptions for oxycodone 20 mg dispense 360 tablets q 30 days, valium 10 mg dispense 30 tablets q 30 days, lorazepam 1 mg dispense 90 tablets q 30 days, fentanyl patches as well. This goes as far back as 1 calendar year.   [facility]    Progress Note    Dominic Reece Patient Status:  Inpatient    3/22/1938 MRN [id number]   Location [facility] 2W/SW Attending Christine Lezama MD   [facility] Day # 10 PCP Loraine Garduno MD     Subjective:     Constitut 

## 2018-01-02 NOTE — PLAN OF CARE
Referral sent to     [facility]  [facility] [phone]  [facility] [phone]  [facility] [phone]  [facility] [phone]  [facility] [phone]  [facility] [phone]  [facility] [phone]    Juliane is f/u.

## 2018-01-02 NOTE — PSYCH
PEC/CEC patient resting in bed with sitter at bedside.  Q 15 minute rounds sheet reviewed.  Spoke with Harlan who stated no safety concerns at this time.  Environmental safety rounds completed.

## 2018-01-02 NOTE — PLAN OF CARE
Pt is PEC/ CEC, and d/c plan will be to admit to an inpatient psych facility. Referral will be sent via [facility] per [facility]

## 2018-01-02 NOTE — HOSPITAL COURSE
"1/1/2018: Visited patient at bedside as requested by primary team.  Patient and mother wished to discuss options for Subutex/Suboxone.  Patient was counseled that the addiction team could see her in morning to discuss all available options.  Patient states that she is interested in Subutex outpatient treatment for pain management, and was counseled that Psychiatrists use these medications as tools for opiate use disorder along with addiction treatment, not to treat pain.  Also counseled that Subutex outpatient treatment was unlikely.  Patient feels that pain management specialists are unattainable due to price and is not interested in addiction treatment, but was agreeable to meeting with the Addiction consult team tomorrow morning.  Patient was also dissatisfied that her psychotropic medications were restarted at low doses.  She feels her depression and anxiety are "out of control" and slept poorly last night.    1/2: This morning pt reports she is not doing well because her room is so cold. Pt reports she has had trouble sleeping 2/2 this. She has been unable to take a shower 2/2 hot water not working in her room. Pt is requesting valium and ativan for her anxiety. She says she wants to go to [facility] so she can be treated with Subutex.     1/3: This morning pt reports she is more comfortable as she has been moved to a different room that is much warmer. Pt reports she did not sleep well last night because there was too much noise and people coming in and out of the room. Also complains of psychiatric medications not yet being at home doses. Pt otherwise doing okay this morning. She is planning to go to [facility] or [place]. Pt telling me she is being discharged today.     1/3/17: Pt seen for addiction psychiatry visit this morning to determine appropriateness for initiating subutex/suboxone. Pt seen in room without mother present. Pt reports that she has began taking oral opiates for pain control related to her sarcoma. " About 8 to 9 months ago, pt was having increased tolerance to pain medications and increased her frequency of use to the point that she was running out of medications about 3 weeks into the month. Pt began supplementing with heroin to avoid withdrawal. Since that time pt has increased her use of heroin to about 1g/ $100 of heroin intranasally a day in addition to taking oral oxycodone. Pt reported taking only 12 of her 20mg oxycodone pills a day during this visit- \    1/4/18: Pt reports she continues to have trouble sleeping. Pt is discouraged about not being a candidate for suboxone. Pt continues to wish to go to [facility] or [place] and is asking about the plans for getting there. No other complaints at this time.

## 2018-01-02 NOTE — PROGRESS NOTES
"[facility]  Psychiatry  Progress Note    Patient Name: Val Gil  MRN: [id number]   Code Status: Full Code  Admission Date: 12/28/2017  Hospital Length of Stay: 5 days  Expected Discharge Date: 1/3/2018  Attending Physician: David London MD  Primary Care Provider: [facility]    Current Legal Status: [facility]    Patient information was obtained from patient and parent.     Subjective:     Principal Problem:Suicide attempt by multiple drug overdose    Chief Complaint: SA    HPI: Val Gil is a 37 y.o. female with past psychiatric history of GLORIA, Depression and Adjustment d/o and PMH of Sarcoma s/p R AKA 2015 who was BIB EMS after intentional overdose in suicide attempt.    Per ED MD note:  History gained from EMS and from patient's mother and from a note that was left by the patient at bedside. Mother reports that patient was home during the day and did not know about patient states until she arrived home at 1800 hrs.  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  Additionally the mother reports here that she was on Percocets but that was eventually shifted to [place].  Her mother also the patient began using heroin in the last month approximately. Patient's note said that she was upset about her heroin addiction, that she had some "speed" that she intended to use as a treatment for her heroin addiction.  The noted initially stated that she applied some toxic substance on the day closed blankets sheets to keep staff away from her.  There was a note additionally under her shirt and said DO NOT RESUSCITATE me I want to die.  Bladder pages of her longer noted " "brittany stated that she asked her father to wash the sheets and blankets so that her dog would not be exposed to whatever toxin that she had spread in the room-there was certainly no further information about what this toxic substance was. EMS reported the presence of multiple police rescue at the bedside already concerned rightly for the problem of hazardous exposures from a possible toxin at bedside.  Patient was poorly responsive as noted requiring painful stimuli to get arousal - no verbal response was detected.     Critical care was consulted after patient could not be aroused after initial evaluation in the ED. Per their note, she was given one dose of Narcan in ED, after which she began to awake. She still had slurred speech and would not engage in conversation or follow commands. They "spoke to patient's father who is unfamiliar with patient's medications, however he gave mother's number. When we tried to contact mother, number was disconnected. Also attempted to contact patient's cell phone, but this number was also disconnected. Patient's home meds include amphetamine salt, clonidine, flexaril, cyproheptadine, valium, fentanyl, lamictal, ativan, remeron, zofran, oxycodone, lyrica, seroquil, and zoloft. Review of  shows regular prescriptions for oxycodone 20 mg dispense 360 tablets q 30 days, valium 10 mg dispense 30 tablets q 30 days, lorazepam 1 mg dispense 90 tablets q 30 days, fentanyl patches as well. This goes as far back as 1 calendar year."    On attempt to interview, patient sleeping on ED stretcher, unable to arouse with verbal stimuli or gentle leg shaking. No family present at bedside.    Per chart review, patient previously seen by psychiatry in 2015 for medication management during admission when she received AKA. She was on Lamictal, Remeron, Seroquel, Zoloft at that time; Zoloft was eventually changed to [place]. Ativan was used PRN anxiety. Patient did not require inpatient psychiatric " "hospitalization at that time.     Interval update 12/31/17: Pt agitated, shivering, pupils dilated, diaphoretic. Pt c/o opiate withdrawal at this time. She is anxious with irritable mood. Pt states multiple life stressors related to her cancer and AKA that have been building for past few years. Pt and her father were kicked out of their rental house ~1 month ago which was "the straw that broke the camel's back." Pt had been thinking of and planning her suicide for 1 week prior to the attempt. Pt becomes tearful many times during interview, and emotionally states "I didn't want to.. Life was just too much. I'm tired of living in pain." Pt may be unreliable, as she denies heroin use (despite admitting it in suicide note) and does not report accurate dose/frequency of her pain regimen. Pt takes Lamictal 75mg qd, Zoloft 100mg qd, Seroquel 100mg qhs, and Remeron 45mg qhs for her depression. Pt again becomes tearful at conclusion of interview, stating that she is "so tired and burnt out on life." Pt minimizes her suicide attempt and drug use and does not think she needs inpatient psych.    Collateral: mother Dilip Reis, [phone]. Rang once and went to voicemail.    Per psychiatry consult note 7/15/15, information will be updated as able:  Past Psychiatric History:   Previous Psychiatric Hospitalizations: No   Previous Medication Trials: Yes  Previous Suicide Attempts: No   History of Violence: No   Outpatient psychiatrist: Dr. 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  Patient and mother wished to discuss options for Subutex/Suboxone.  Patient was counseled that the addiction team could see her in morning to discuss all available options.  Patient states that she is interested in Subutex outpatient treatment for pain management, and was counseled that Psychiatrists use these medications as tools for opiate use disorder along with addiction treatment, not to treat pain.  Also counseled that Subutex outpatient treatment was unlikely.  Patient feels that pain management specialists are unattainable due to price and is not interested in addiction treatment, but was agreeable to meeting with the Addiction consult team tomorrow morning.  Patient was also dissatisfied that her psychotropic medications were restarted at low doses.  She feels her depression and anxiety are "out of control" and slept poorly last night.    1/2: This morning pt reports she is not doing well because her room is so cold. Pt reports she has had trouble sleeping 2/2 this. She has been unable to take a shower 2/2 hot water not working in her room. Pt is requesting valium and ativan for her anxiety. She says she wants to go to [facility] so she can be treated with Subutex.          Patient History           Medical as of 1/2/2018     Past Medical History     Diagnosis Date Comments Source    Angiosarcoma -- -- Provider    Angiosarcoma -- -- Provider    Anxiety -- -- Provider    Bone cancer -- -- Provider    Depression -- -- Provider          Pertinent Negatives     Diagnosis Date Noted Comments Source    Asthma 6/29/2015 -- Provider    COPD (chronic obstructive pulmonary disease) 6/29/2015 -- Provider    Diabetes " mellitus 1/31/2015 -- Provider    Hypertension 6/29/2015 -- Provider                  Surgical as of 1/2/2018     Past Surgical History     Procedure Laterality Date Comments Source    LEG AMPUTATION AT HIP Left 7/2015 -- Provider                  Family as of 1/2/2018     Problem Relation Name Age of Onset Comments Source    Cancer Paternal Uncle great uncle -- lung Provider    Cancer Maternal Grandmother -- -- breast Provider    Heart attack Maternal Grandmother -- -- -- Provider    Arthritis Maternal Grandmother -- -- -- Provider    Heart attack Maternal Grandfather -- -- -- Provider    Arthritis Maternal Grandfather -- -- -- Provider    Diabetes Paternal Grandmother -- -- -- Provider    Hypertension Paternal Grandfather -- -- -- Provider    Heart attack Paternal Grandfather -- -- -- Provider    No Known Problems Father -- -- -- Provider            Tobacco Use as of 1/2/2018     Smoking Status Smoking Start Date Smoking Quit Date Packs/day Years Used    Former Smoker -- -- -- --    Types Comments Smokeless Tobacco Status Smokeless Tobacco Quit Date Source    -- -- Never Used -- Provider            Alcohol Use as of 1/2/2018     Alcohol Use Drinks/Week Alcohol/Week Comments Source    No -- -- -- Provider            Drug Use as of 1/2/2018     Drug Use Types Frequency Comments Source    No -- -- -- Provider            Sexual Activity as of 1/2/2018     Sexually Active Birth Control Partners Comments Source    Not Asked -- -- -- Provider            Activities of Daily Living as of 1/2/2018    **None**           Social Documentation as of 1/2/2018    **None**           Occupational as of 1/2/2018    **None**           Socioeconomic as of 1/2/2018     Marital Status Spouse Name Number of Children Years Education Preferred Language Ethnicity Race Source    Single -- -- -- English /White White --         Pertinent History Q A Comments    as of 1/2/2018 Lives with      Place in Birth Order      Lives in       Number of Siblings      Raised by      Legal Involvement      Childhood Trauma      Criminal History of      Financial Status      Highest Level of Education      Does patient have access to a firearm?       Service      Primary Leisure Activity      Spirituality       Past Medical History:   Diagnosis Date    Angiosarcoma     Angiosarcoma     Anxiety     Bone cancer     Depression      Past Surgical History:   Procedure Laterality Date    LEG AMPUTATION AT HIP Left 7/2015     Family History     Problem Relation (Age of Onset)    Arthritis Maternal Grandmother, Maternal Grandfather    Cancer Paternal Uncle, Maternal Grandmother    Diabetes Paternal Grandmother    Heart attack Maternal Grandmother, Maternal Grandfather, Paternal Grandfather    Hypertension Paternal Grandfather    No Known Problems Father        Social History Main Topics    Smoking status: Former Smoker    Smokeless tobacco: Never Used    Alcohol use No    Drug use: No    Sexual activity: Not on file     Review of patient's allergies indicates:   Allergen Reactions    Phenergan [promethazine] Anxiety     Pt reports that she can take po phenergan without any reaction.        No current facility-administered medications on file prior to encounter.      Current Outpatient Prescriptions on File Prior to Encounter   Medication Sig    AMPHETAMINE SALT COMBO 15 MG tablet 30 mg.     cloNIDine (CATAPRES) 0.1 MG tablet Take 1 tablet (0.1 mg total) by mouth 3 (three) times daily as needed (withdrawal).    cyclobenzaprine (FLEXERIL) 10 MG tablet     cyproheptadine (PERIACTIN) 4 mg tablet     diazepam (VALIUM) 5 MG tablet 10 mg.     fentanyl (DURAGESIC) 12 mcg/hr PT72     fentaNYL (DURAGESIC) 25 mcg/hr     lamotrigine (LAMICTAL) 25 MG tablet Take 2 tablets (50 mg total) by mouth once daily.    lorazepam (ATIVAN) 0.5 MG tablet Take 0.5 mg by mouth 2 (two) times daily.     lorazepam (ATIVAN) 1 MG tablet     mirtazapine (REMERON) 45 MG  "tablet Take 1 tablet (45 mg total) by mouth every evening.    ondansetron (ZOFRAN) 8 MG tablet Take 1 tablet (8 mg total) by mouth every 6 (six) hours as needed.    oxycodone (ROXICODONE) 10 mg Tab immediate release tablet Take 1-2 tablets (10-20 mg total) by mouth every 6 (six) hours as needed for Pain.    oxycodone (ROXICODONE) 5 MG immediate release tablet Take 2 tablets (10 mg total) by mouth every 6 (six) hours as needed for Pain.    pregabalin (LYRICA) 50 MG capsule Take 1 capsule (50 mg total) by mouth once daily.    promethazine (PHENERGAN) 25 MG tablet     quetiapine (SEROQUEL) 100 MG Tab Take 1 tablet (100 mg total) by mouth every evening.    senna-docusate 8.6-50 mg (PERICOLACE) 8.6-50 mg per tablet Take 2 tablets by mouth 2 (two) times daily.    sertraline (ZOLOFT) 50 MG tablet     sucralfate (CARAFATE) 1 gram tablet Take 1 g by mouth 4 (four) times daily.     Psychotherapeutics     Start     Stop Route Frequency Ordered    01/01/18 2100  mirtazapine tablet 30 mg      -- Oral Nightly 01/01/18 1904    01/01/18 0900  sertraline tablet 50 mg      -- Oral Daily 12/31/17 1538    12/31/17 2100  QUEtiapine tablet 100 mg      -- Oral Nightly 12/31/17 1538    12/30/17 2207  LORazepam tablet 2 mg      -- Oral Every 6 hours PRN 12/30/17 2107        Review of Systems    Strengths and Liabilities: Strength: Patient is expressive/articulate., Liability: Patient is defensive., Liability: Patient lacks coping skills.    Objective:     Vital Signs (Most Recent):  Temp: 97.6 °F (36.4 °C) (01/02/18 1545)  Pulse: 95 (01/02/18 1545)  Resp: 18 (01/02/18 1545)  BP: (!) 147/91 (01/02/18 1545)  SpO2: 97 % (01/02/18 1545) Vital Signs (24h Range):  Temp:  [97.6 °F (36.4 °C)-98.5 °F (36.9 °C)] 97.6 °F (36.4 °C)  Pulse:  [] 95  Resp:  [16-18] 18  SpO2:  [97 %-99 %] 97 %  BP: (132-152)/(82-91) 147/91     Height: 5' 4" (162.6 cm)  Weight: 63.5 kg (139 lb 15.9 oz)  Body mass index is 24.03 kg/m².      Intake/Output " Summary (Last 24 hours) at 01/02/18 1706  Last data filed at 01/02/18 0600   Gross per 24 hour   Intake              240 ml   Output                0 ml   Net              240 ml       Physical Exam     Appearance: AKA, fair grooming/hygeine wearing hospital gown in NAD; diaphoretic, dilated pupils, tremulous  Behavior: somewhat agitated, defensive, minimizing  Speech: normal rate, tone, and volume  Mood: depressed  Affect: anxious  Thought Process: linear  Thought Perceptions: denied AVH  Thought Content: denied SI, HI; no delusions apparent  Sensorium: awake, alert  Attention/Concentration: intact to conversation  Orientation: person, place, time, and situation  Memory: intact (recent, remote)  Abstraction: intact   Insight: poor  Judgment: good         Significant Labs:   Recent Results (from the past 48 hour(s))   Comprehensive metabolic panel    Collection Time: 01/01/18  5:50 AM   Result Value Ref Range    Sodium 140 136 - 145 mmol/L    Potassium 3.6 3.5 - 5.1 mmol/L    Chloride 108 95 - 110 mmol/L    CO2 24 23 - 29 mmol/L    Glucose 93 70 - 110 mg/dL    BUN, Bld 12 6 - 20 mg/dL    Creatinine 0.8 0.5 - 1.4 mg/dL    Calcium 10.0 8.7 - 10.5 mg/dL    Total Protein 7.4 6.0 - 8.4 g/dL    Albumin 3.7 3.5 - 5.2 g/dL    Total Bilirubin 0.4 0.1 - 1.0 mg/dL    Alkaline Phosphatase 106 55 - 135 U/L    AST 21 10 - 40 U/L    ALT 27 10 - 44 U/L    Anion Gap 8 8 - 16 mmol/L    eGFR if African American >60.0 >60 mL/min/1.73 m^2    eGFR if non African American >60.0 >60 mL/min/1.73 m^2   Magnesium    Collection Time: 01/01/18  5:50 AM   Result Value Ref Range    Magnesium 2.0 1.6 - 2.6 mg/dL   Phosphorus    Collection Time: 01/01/18  5:50 AM   Result Value Ref Range    Phosphorus 2.6 (L) 2.7 - 4.5 mg/dL   CBC auto differential    Collection Time: 01/01/18  5:50 AM   Result Value Ref Range    WBC 7.02 3.90 - 12.70 K/uL    RBC 4.11 4.00 - 5.40 M/uL    Hemoglobin 12.1 12.0 - 16.0 g/dL    Hematocrit 35.0 (L) 37.0 - 48.5 %    MCV  85 82 - 98 fL    MCH 29.4 27.0 - 31.0 pg    MCHC 34.6 32.0 - 36.0 g/dL    RDW 11.9 11.5 - 14.5 %    Platelets 357 (H) 150 - 350 K/uL    MPV 10.2 9.2 - 12.9 fL    Immature Granulocytes 0.9 (H) 0.0 - 0.5 %    Gran # 4.8 1.8 - 7.7 K/uL    Immature Grans (Abs) 0.06 (H) 0.00 - 0.04 K/uL    Lymph # 1.7 1.0 - 4.8 K/uL    Mono # 0.4 0.3 - 1.0 K/uL    Eos # 0.1 0.0 - 0.5 K/uL    Baso # 0.02 0.00 - 0.20 K/uL    nRBC 0 0 /100 WBC    Gran% 68.0 38.0 - 73.0 %    Lymph% 23.5 18.0 - 48.0 %    Mono% 5.4 4.0 - 15.0 %    Eosinophil% 1.9 0.0 - 8.0 %    Basophil% 0.3 0.0 - 1.9 %    Differential Method Automated    Acetaminophen level    Collection Time: 01/01/18  5:50 AM   Result Value Ref Range    Acetaminophen (Tylenol), Serum <3.0 (L) 10.0 - 20.0 ug/mL   Protime-INR    Collection Time: 01/01/18  5:50 AM   Result Value Ref Range    Prothrombin Time 11.4 9.0 - 12.5 sec    INR 1.1 0.8 - 1.2   Tricyclic Screen, Serum    Collection Time: 01/01/18  5:50 AM   Result Value Ref Range    TCA Scrn 232.1 See Text ng/mL   Acetaminophen level    Collection Time: 01/01/18  4:45 PM   Result Value Ref Range    Acetaminophen (Tylenol), Serum <3.0 (L) 10.0 - 20.0 ug/mL   Comprehensive metabolic panel    Collection Time: 01/02/18  4:19 AM   Result Value Ref Range    Sodium 139 136 - 145 mmol/L    Potassium 3.3 (L) 3.5 - 5.1 mmol/L    Chloride 106 95 - 110 mmol/L    CO2 25 23 - 29 mmol/L    Glucose 87 70 - 110 mg/dL    BUN, Bld 12 6 - 20 mg/dL    Creatinine 0.7 0.5 - 1.4 mg/dL    Calcium 9.5 8.7 - 10.5 mg/dL    Total Protein 7.2 6.0 - 8.4 g/dL    Albumin 3.6 3.5 - 5.2 g/dL    Total Bilirubin 0.3 0.1 - 1.0 mg/dL    Alkaline Phosphatase 105 55 - 135 U/L    AST 33 10 - 40 U/L    ALT 37 10 - 44 U/L    Anion Gap 8 8 - 16 mmol/L    eGFR if African American >60.0 >60 mL/min/1.73 m^2    eGFR if non African American >60.0 >60 mL/min/1.73 m^2   Magnesium    Collection Time: 01/02/18  4:19 AM   Result Value Ref Range    Magnesium 2.2 1.6 - 2.6 mg/dL   Phosphorus     Collection Time: 01/02/18  4:19 AM   Result Value Ref Range    Phosphorus 3.2 2.7 - 4.5 mg/dL   CBC auto differential    Collection Time: 01/02/18  4:19 AM   Result Value Ref Range    WBC 7.16 3.90 - 12.70 K/uL    RBC 4.00 4.00 - 5.40 M/uL    Hemoglobin 11.9 (L) 12.0 - 16.0 g/dL    Hematocrit 34.1 (L) 37.0 - 48.5 %    MCV 85 82 - 98 fL    MCH 29.8 27.0 - 31.0 pg    MCHC 34.9 32.0 - 36.0 g/dL    RDW 11.9 11.5 - 14.5 %    Platelets 324 150 - 350 K/uL    MPV 10.1 9.2 - 12.9 fL    Immature Granulocytes 1.3 (H) 0.0 - 0.5 %    Gran # 4.9 1.8 - 7.7 K/uL    Immature Grans (Abs) 0.09 (H) 0.00 - 0.04 K/uL    Lymph # 1.4 1.0 - 4.8 K/uL    Mono # 0.6 0.3 - 1.0 K/uL    Eos # 0.1 0.0 - 0.5 K/uL    Baso # 0.03 0.00 - 0.20 K/uL    nRBC 0 0 /100 WBC    Gran% 68.5 38.0 - 73.0 %    Lymph% 20.0 18.0 - 48.0 %    Mono% 8.4 4.0 - 15.0 %    Eosinophil% 1.4 0.0 - 8.0 %    Basophil% 0.4 0.0 - 1.9 %    Differential Method Automated    Acetaminophen level    Collection Time: 01/02/18  4:19 AM   Result Value Ref Range    Acetaminophen (Tylenol), Serum <3.0 (L) 10.0 - 20.0 ug/mL   Protime-INR    Collection Time: 01/02/18  4:19 AM   Result Value Ref Range    Prothrombin Time 11.3 9.0 - 12.5 sec    INR 1.1 0.8 - 1.2   Tricyclic Screen, Serum    Collection Time: 01/02/18  4:19 AM   Result Value Ref Range    TCA Scrn 211.5 See Text ng/mL      No results found for: PHENYTOIN, PHENOBARB, VALPROATE, CBMZ      Significant Imaging: I have reviewed all pertinent imaging results/findings within the past 24 hours.    Assessment/Plan:     * Suicide attempt by multiple drug overdose    - Agree with PEC in setting of deliberate suicide attempt, will seek inpatient placement once medically cleared, currently being admitted from ED to ICU. At this time concern for patient developing possible serotonin syndrome, NMS, tylenol toxicity and TCA toxicity. Overdose on Seroquel also likely given prolonged Qtc (since ED arrival 453 -> 497).  - Per her suicide note  recent use of heroin and methamphetamines, UDS + opiates and benzos  - Home regimen per ICU team amphetamine salts, clonidine, flexaril, cyproheptadine, valium, fentanyl, lamictal, ativan, remeron, zofran, oxycodone, lyrica, seroquel, and zoloft.   - Per primary team: Review of  shows regular prescriptions for oxycodone 20 mg dispense 360 tablets q 30 days, valium 10 mg dispense 30 tablets q 30 days, lorazepam 1 mg dispense 90 tablets q 30 days, fentanyl patches as well. This goes as far back as 1 calendar year.  - Continue home psychotropic medications, increase Remeron to 30 mg po qhs for mood and sleep, and to gain therapeutic alliance with the patient.   - May use Ativan 2mg PO or IM q6 hours PRN severe anxiety; would avoid using antipsychotics     Plan:   - Pt in acute opiate withdrawal at this time  - Will discuss initiation of subutex taper detox vs. Continued conservative management.  Addiction Psychiatry to meet with patient tomorrow to .   - Continue PEC/CEC for danger to self and transfer to inpatient psych once medically cleared                 Need for Continued Hospitalization:   Psychiatric illness continues to pose a potential threat to life or bodily function, of self or others, thereby requiring the need for continued inpatient psychiatric hospitalization.    Anticipated Disposition: Psychiatric Hospital     Total time:  35  with greater than 50% of this time spent in counseling and/or coordination of care.       Arnaldo Gerber MD   Psychiatry  [facility]

## 2018-01-02 NOTE — PT/OT/SLP DISCHARGE
Occupational Therapy Discharge Summary    Val Gil  MRN: [id number]   Principal Problem: Suicide attempt by multiple drug overdose      Pt reports no OT needs at this time, stating she is Mod (I) with crutches for ADLs and functional mobility.  Pt with complaints of decreased  of L hand and pain on ring and pinky fingers. Potential need for OP OT to address. No acute OT needs. Will discharge at this time.    ERICK Love  1/2/2018

## 2018-01-03 LAB
ALBUMIN SERPL BCP-MCNC: 3.6 G/DL
ALP SERPL-CCNC: 111 U/L
ALT SERPL W/O P-5'-P-CCNC: 45 U/L
AMPHETAMINES SERPL QL: NEGATIVE
ANION GAP SERPL CALC-SCNC: 10 MMOL/L
AST SERPL-CCNC: 27 U/L
BACTERIA BLD CULT: NORMAL
BACTERIA BLD CULT: NORMAL
BARBITURATES SERPL QL SCN: NEGATIVE
BASOPHILS # BLD AUTO: 0.02 K/UL
BASOPHILS NFR BLD: 0.4 %
BENZODIAZ SERPL QL: POSITIVE
BILIRUB SERPL-MCNC: 0.2 MG/DL
BUN SERPL-MCNC: 12 MG/DL
CALCIUM SERPL-MCNC: 9.7 MG/DL
CANNABINOIDS SERPL QL: NEGATIVE
CHLORIDE SERPL-SCNC: 107 MMOL/L
CO2 SERPL-SCNC: 25 MMOL/L
COCAINE, BLOOD: NEGATIVE
CREAT SERPL-MCNC: 0.7 MG/DL
DIFFERENTIAL METHOD: ABNORMAL
EOSINOPHIL # BLD AUTO: 0 K/UL
EOSINOPHIL NFR BLD: 0.5 %
ERYTHROCYTE [DISTWIDTH] IN BLOOD BY AUTOMATED COUNT: 11.9 %
EST. GFR  (AFRICAN AMERICAN): >60 ML/MIN/1.73 M^2
EST. GFR  (NON AFRICAN AMERICAN): >60 ML/MIN/1.73 M^2
ETHANOL SERPL-MCNC: NEGATIVE MG/DL
GLUCOSE SERPL-MCNC: 94 MG/DL
HCT VFR BLD AUTO: 33.9 %
HGB BLD-MCNC: 11.9 G/DL
IMM GRANULOCYTES # BLD AUTO: 0.09 K/UL
IMM GRANULOCYTES NFR BLD AUTO: 1.6 %
INR PPP: 1.1
LYMPHOCYTES # BLD AUTO: 0.9 K/UL
LYMPHOCYTES NFR BLD: 15.2 %
MAGNESIUM SERPL-MCNC: 2 MG/DL
MCH RBC QN AUTO: 29.7 PG
MCHC RBC AUTO-ENTMCNC: 35.1 G/DL
MCV RBC AUTO: 85 FL
METHADONE SERPL QL SCN: NEGATIVE
MONOCYTES # BLD AUTO: 0.7 K/UL
MONOCYTES NFR BLD: 12.4 %
NEUTROPHILS # BLD AUTO: 3.9 K/UL
NEUTROPHILS NFR BLD: 69.9 %
NRBC BLD-RTO: 0 /100 WBC
OPIATES SERPL QL SCN: POSITIVE
PCP SERPL QL SCN: NEGATIVE
PHOSPHATE SERPL-MCNC: 3.1 MG/DL
PLATELET # BLD AUTO: 321 K/UL
PMV BLD AUTO: 9.6 FL
POTASSIUM SERPL-SCNC: 3.3 MMOL/L
PROPOXYPH SERPL QL: NEGATIVE
PROT SERPL-MCNC: 7.3 G/DL
PROTHROMBIN TIME: 11.5 SEC
RBC # BLD AUTO: 4.01 M/UL
SODIUM SERPL-SCNC: 142 MMOL/L
TRICYCLICS SERPL-MCNC: 185.1 NG/ML
WBC # BLD AUTO: 5.58 K/UL

## 2018-01-03 PROCEDURE — S4991 NICOTINE PATCH NONLEGEND: HCPCS | Performed by: PHYSICIAN ASSISTANT

## 2018-01-03 PROCEDURE — 11000001 HC ACUTE MED/SURG PRIVATE ROOM

## 2018-01-03 PROCEDURE — 25000003 PHARM REV CODE 250: Performed by: HOSPITALIST

## 2018-01-03 PROCEDURE — 25000003 PHARM REV CODE 250: Performed by: PSYCHIATRY & NEUROLOGY

## 2018-01-03 PROCEDURE — 93010 ELECTROCARDIOGRAM REPORT: CPT | Mod: ,,, | Performed by: INTERNAL MEDICINE

## 2018-01-03 PROCEDURE — 80307 DRUG TEST PRSMV CHEM ANLYZR: CPT

## 2018-01-03 PROCEDURE — 84100 ASSAY OF PHOSPHORUS: CPT

## 2018-01-03 PROCEDURE — 85610 PROTHROMBIN TIME: CPT

## 2018-01-03 PROCEDURE — 63600175 PHARM REV CODE 636 W HCPCS: Performed by: STUDENT IN AN ORGANIZED HEALTH CARE EDUCATION/TRAINING PROGRAM

## 2018-01-03 PROCEDURE — 83735 ASSAY OF MAGNESIUM: CPT

## 2018-01-03 PROCEDURE — 25000003 PHARM REV CODE 250: Performed by: PHYSICIAN ASSISTANT

## 2018-01-03 PROCEDURE — 36415 COLL VENOUS BLD VENIPUNCTURE: CPT

## 2018-01-03 PROCEDURE — 99232 SBSQ HOSP IP/OBS MODERATE 35: CPT | Mod: AF,HB,, | Performed by: PSYCHIATRY & NEUROLOGY

## 2018-01-03 PROCEDURE — 99233 SBSQ HOSP IP/OBS HIGH 50: CPT | Mod: ,,, | Performed by: HOSPITALIST

## 2018-01-03 PROCEDURE — 80053 COMPREHEN METABOLIC PANEL: CPT

## 2018-01-03 PROCEDURE — 85025 COMPLETE CBC W/AUTO DIFF WBC: CPT

## 2018-01-03 PROCEDURE — 93005 ELECTROCARDIOGRAM TRACING: CPT

## 2018-01-03 RX ORDER — LORAZEPAM 1 MG/1
2 TABLET ORAL ONCE
Status: COMPLETED | OUTPATIENT
Start: 2018-01-03 | End: 2018-01-03

## 2018-01-03 RX ORDER — POTASSIUM CHLORIDE 20 MEQ/1
40 TABLET, EXTENDED RELEASE ORAL ONCE
Status: COMPLETED | OUTPATIENT
Start: 2018-01-03 | End: 2018-01-03

## 2018-01-03 RX ORDER — LAMOTRIGINE 25 MG/1
25 TABLET ORAL ONCE
Status: COMPLETED | OUTPATIENT
Start: 2018-01-03 | End: 2018-01-03

## 2018-01-03 RX ORDER — OXYCODONE HYDROCHLORIDE 20 MG/1
40 TABLET ORAL EVERY 4 HOURS PRN
Status: ON HOLD | COMMUNITY
End: 2018-01-04

## 2018-01-03 RX ORDER — PROMETHAZINE HYDROCHLORIDE 12.5 MG/1
12.5 SUPPOSITORY RECTAL EVERY 6 HOURS PRN
Status: DISCONTINUED | OUTPATIENT
Start: 2018-01-03 | End: 2018-01-04 | Stop reason: HOSPADM

## 2018-01-03 RX ORDER — CALCIUM CARBONATE 200(500)MG
500 TABLET,CHEWABLE ORAL DAILY PRN
Status: DISCONTINUED | OUTPATIENT
Start: 2018-01-03 | End: 2018-01-04 | Stop reason: HOSPADM

## 2018-01-03 RX ORDER — SERTRALINE HYDROCHLORIDE 50 MG/1
50 TABLET, FILM COATED ORAL NIGHTLY
Status: DISCONTINUED | OUTPATIENT
Start: 2018-01-03 | End: 2018-01-04 | Stop reason: HOSPADM

## 2018-01-03 RX ORDER — FENTANYL 100 UG/H
1 PATCH TRANSDERMAL
Status: ON HOLD | COMMUNITY
End: 2018-01-04

## 2018-01-03 RX ADMIN — IBUPROFEN 600 MG: 600 TABLET, FILM COATED ORAL at 08:01

## 2018-01-03 RX ADMIN — LORAZEPAM 2 MG: 1 TABLET ORAL at 08:01

## 2018-01-03 RX ADMIN — LORAZEPAM 2 MG: 1 TABLET ORAL at 01:01

## 2018-01-03 RX ADMIN — CALCIUM CARBONATE (ANTACID) CHEW TAB 500 MG 500 MG: 500 CHEW TAB at 02:01

## 2018-01-03 RX ADMIN — PROMETHAZINE HYDROCHLORIDE 12.5 MG: 12.5 TABLET ORAL at 05:01

## 2018-01-03 RX ADMIN — POTASSIUM CHLORIDE 40 MEQ: 1500 TABLET, EXTENDED RELEASE ORAL at 01:01

## 2018-01-03 RX ADMIN — LAMOTRIGINE 25 MG: 25 TABLET ORAL at 01:01

## 2018-01-03 RX ADMIN — PROMETHAZINE HYDROCHLORIDE 12.5 MG: 12.5 TABLET ORAL at 09:01

## 2018-01-03 RX ADMIN — IBUPROFEN 600 MG: 600 TABLET, FILM COATED ORAL at 01:01

## 2018-01-03 RX ADMIN — SERTRALINE HYDROCHLORIDE 50 MG: 50 TABLET ORAL at 09:01

## 2018-01-03 RX ADMIN — NICOTINE 1 PATCH: 21 PATCH, EXTENDED RELEASE TRANSDERMAL at 08:01

## 2018-01-03 RX ADMIN — LAMOTRIGINE 25 MG: 25 TABLET ORAL at 08:01

## 2018-01-03 RX ADMIN — QUETIAPINE FUMARATE 100 MG: 100 TABLET, FILM COATED ORAL at 09:01

## 2018-01-03 RX ADMIN — Medication 10 ML: at 09:01

## 2018-01-03 RX ADMIN — ENOXAPARIN SODIUM 40 MG: 100 INJECTION SUBCUTANEOUS at 05:01

## 2018-01-03 RX ADMIN — MIRTAZAPINE 30 MG: 30 TABLET, FILM COATED ORAL at 09:01

## 2018-01-03 RX ADMIN — CALCIUM CARBONATE (ANTACID) CHEW TAB 500 MG 500 MG: 500 CHEW TAB at 05:01

## 2018-01-03 RX ADMIN — PROMETHAZINE HYDROCHLORIDE 12.5 MG: 12.5 TABLET ORAL at 01:01

## 2018-01-03 RX ADMIN — PROMETHAZINE HYDROCHLORIDE 12.5 MG: 12.5 SUPPOSITORY RECTAL at 12:01

## 2018-01-03 NOTE — ASSESSMENT & PLAN NOTE
-Patient ingested unknown amount as well as identity of multiple substances  -UDS positive for benzos and opiates  -Will contact family for more information  -Patient currently PEC'd  -Psychiatry consulted  -Patient's home meds include amphetamine salt, clonidine, flexaril, cyproheptadine, valium, fentanyl, lamictal, ativan, remeron, zofran, oxycodone, lyrica, seroquil, and zoloft  -Salicylate level <5.0  -Acetaminophen level normal  - Addiction Psychiatry consulted   - Awaiting placement to Inpatient Psychiatry unit, will need PT/Ot prior   -Patient started on NAC, will continue to trend acetominophen levels q 12 hours  -Liver enzymes not elevated  - daily TCA levels normal   -Spoke to patient's father who is unfamiliar with patient's medications, however he gave us mother's number. When we tried to contact mother, number was disconnected. Also attempted to contact patient's cell phone, but this number was also disconnected   -EKG showed sinus tachycardia,   -Review of  shows regular prescriptions for oxycodone 20 mg dispense 360 tablets q 30 days, valium 10 mg dispense 30 tablets q 30 days, lorazepam 1 mg dispense 90 tablets q 30 days, fentanyl patches as well. This goes as far back as 1 calendar year.  - Restart  Zoloft, Remeron, Lamictal and Seroquel on 12/31/17  - Remeron increased to 30mg QHS on 01/02/18

## 2018-01-03 NOTE — PROGRESS NOTES
[id number] Page placed to medical team Mindi to notify him of pt refusing to take sertraline 50mg Po in the AM due to pt wanting to take at night. Pt also requesting xray of hand, lidocaine patch, and benadryl.       [id number] Paged placed again      [id number] talked to Joselito. Stated he is fixing sertraline to nightly and will talk to pt about other concerns when he is bedside.

## 2018-01-03 NOTE — SUBJECTIVE & OBJECTIVE
Interval History: Patient seen and examined at bedside. Reports some mild improvement in symptoms with heat pads but still reports pain is high. Possible transfer to inpatient Psychiatry facility tomorrow    Review of Systems   Constitutional: Negative for chills and fatigue.   HENT: Positive for drooling. Negative for dental problem.    Eyes: Negative for pain and discharge.   Respiratory: Negative for apnea, cough and chest tightness.    Cardiovascular: Negative for chest pain.   Gastrointestinal: Negative for abdominal distention and abdominal pain.   Genitourinary: Negative for enuresis and flank pain.   Musculoskeletal: Negative for gait problem and joint swelling.   Neurological: Negative for seizures and syncope.   Psychiatric/Behavioral: Positive for agitation, behavioral problems and suicidal ideas. The patient is nervous/anxious.      Objective:     Vital Signs (Most Recent):  Temp: 98 °F (36.7 °C) (01/02/18 1940)  Pulse: 83 (01/02/18 1940)  Resp: 18 (01/02/18 1940)  BP: (!) 135/95 (01/02/18 1940)  SpO2: 98 % (01/02/18 1940) Vital Signs (24h Range):  Temp:  [97.6 °F (36.4 °C)-98.5 °F (36.9 °C)] 98 °F (36.7 °C)  Pulse:  [] 83  Resp:  [16-18] 18  SpO2:  [97 %-99 %] 98 %  BP: (132-152)/(82-95) 135/95     Weight: 63.5 kg (139 lb 15.9 oz)  Body mass index is 24.03 kg/m².    Intake/Output Summary (Last 24 hours) at 01/02/18 2149  Last data filed at 01/02/18 0600   Gross per 24 hour   Intake              240 ml   Output                0 ml   Net              240 ml      Physical Exam   Constitutional: She appears well-developed and well-nourished.   HENT:   Head: Normocephalic.   Mouth/Throat: No oropharyngeal exudate.   Eyes: No scleral icterus.   Neck: Normal range of motion. No thyromegaly present.   Cardiovascular: Normal rate, regular rhythm, normal heart sounds and intact distal pulses.  Exam reveals no gallop and no friction rub.    No murmur heard.  Pulmonary/Chest: Effort normal and breath sounds  normal. No respiratory distress. She has no wheezes. She exhibits no tenderness.   Abdominal: Soft. She exhibits no distension. There is no tenderness.   Musculoskeletal:   Patient with right AKA   Skin: She is not diaphoretic.       Significant Labs:   CBC:     Recent Labs  Lab 01/01/18  0550 01/02/18 0419   WBC 7.02 7.16   HGB 12.1 11.9*   HCT 35.0* 34.1*   * 324     CMP:     Recent Labs  Lab 01/01/18  0550 01/02/18 0419    139   K 3.6 3.3*    106   CO2 24 25   GLU 93 87   BUN 12 12   CREATININE 0.8 0.7   CALCIUM 10.0 9.5   PROT 7.4 7.2   ALBUMIN 3.7 3.6   BILITOT 0.4 0.3   ALKPHOS 106 105   AST 21 33   ALT 27 37   ANIONGAP 8 8   EGFRNONAA >60.0 >60.0     Coagulation:     Recent Labs  Lab 01/02/18 0419   INR 1.1     Lactic Acid: No results for input(s): LACTATE in the last 48 hours.  Lipase: No results for input(s): LIPASE in the last 48 hours.  Magnesium:     Recent Labs  Lab 01/01/18  0550 01/02/18 0419   MG 2.0 2.2       Significant Imaging: I have reviewed and interpreted all pertinent imaging results/findings within the past 24 hours.

## 2018-01-03 NOTE — SUBJECTIVE & OBJECTIVE
Patient History           Medical as of 1/3/2018     Past Medical History     Diagnosis Date Comments Source    Angiosarcoma -- -- Provider    Angiosarcoma -- -- Provider    Anxiety -- -- Provider    Bone cancer -- -- Provider    Depression -- -- Provider          Pertinent Negatives     Diagnosis Date Noted Comments Source    Asthma 6/29/2015 -- Provider    COPD (chronic obstructive pulmonary disease) 6/29/2015 -- Provider    Diabetes mellitus 1/31/2015 -- Provider    Hypertension 6/29/2015 -- Provider                  Surgical as of 1/3/2018     Past Surgical History     Procedure Laterality Date Comments Source    LEG AMPUTATION AT HIP Left 7/2015 -- Provider                  Family as of 1/3/2018     Problem Relation Name Age of Onset Comments Source    Cancer Paternal Uncle great uncle -- lung Provider    Cancer Maternal Grandmother -- -- breast Provider    Heart attack Maternal Grandmother -- -- -- Provider    Arthritis Maternal Grandmother -- -- -- Provider    Heart attack Maternal Grandfather -- -- -- Provider    Arthritis Maternal Grandfather -- -- -- Provider    Diabetes Paternal Grandmother -- -- -- Provider    Hypertension Paternal Grandfather -- -- -- Provider    Heart attack Paternal Grandfather -- -- -- Provider    No Known Problems Father -- -- -- Provider            Tobacco Use as of 1/3/2018     Smoking Status Smoking Start Date Smoking Quit Date Packs/day Years Used    Former Smoker -- -- -- --    Types Comments Smokeless Tobacco Status Smokeless Tobacco Quit Date Source    -- -- Never Used -- Provider            Alcohol Use as of 1/3/2018     Alcohol Use Drinks/Week Alcohol/Week Comments Source    No -- -- -- Provider            Drug Use as of 1/3/2018     Drug Use Types Frequency Comments Source    No -- -- -- Provider            Sexual Activity as of 1/3/2018     Sexually Active Birth Control Partners Comments Source    Not Asked -- -- -- Provider            Activities of Daily Living as  of 1/3/2018    **None**           Social Documentation as of 1/3/2018    **None**           Occupational as of 1/3/2018    **None**           Socioeconomic as of 1/3/2018     Marital Status Spouse Name Number of Children Years Education Preferred Language Ethnicity Race Source    Single -- -- -- English /White White --         Pertinent History Q A Comments    as of 1/3/2018 Lives with      Place in Birth Order      Lives in      Number of Siblings      Raised by      Legal Involvement      Childhood Trauma      Criminal History of      Financial Status      Highest Level of Education      Does patient have access to a firearm?       Service      Primary Leisure Activity      Spirituality       Past Medical History:   Diagnosis Date    Angiosarcoma     Angiosarcoma     Anxiety     Bone cancer     Depression      Past Surgical History:   Procedure Laterality Date    LEG AMPUTATION AT HIP Left 7/2015     Family History     Problem Relation (Age of Onset)    Arthritis Maternal Grandmother, Maternal Grandfather    Cancer Paternal Uncle, Maternal Grandmother    Diabetes Paternal Grandmother    Heart attack Maternal Grandmother, Maternal Grandfather, Paternal Grandfather    Hypertension Paternal Grandfather    No Known Problems Father        Social History Main Topics    Smoking status: Former Smoker    Smokeless tobacco: Never Used    Alcohol use No    Drug use: No    Sexual activity: Not on file     Review of patient's allergies indicates:   Allergen Reactions    Phenergan [promethazine] Anxiety     Pt reports that she can take po phenergan without any reaction.        No current facility-administered medications on file prior to encounter.      Current Outpatient Prescriptions on File Prior to Encounter   Medication Sig    AMPHETAMINE SALT COMBO 15 MG tablet 30 mg.     cloNIDine (CATAPRES) 0.1 MG tablet Take 1 tablet (0.1 mg total) by mouth 3 (three) times daily as needed (withdrawal).     cyclobenzaprine (FLEXERIL) 10 MG tablet     cyproheptadine (PERIACTIN) 4 mg tablet     diazepam (VALIUM) 5 MG tablet 10 mg.     fentanyl (DURAGESIC) 12 mcg/hr PT72     fentaNYL (DURAGESIC) 25 mcg/hr     lamotrigine (LAMICTAL) 25 MG tablet Take 2 tablets (50 mg total) by mouth once daily.    lorazepam (ATIVAN) 0.5 MG tablet Take 0.5 mg by mouth 2 (two) times daily.     lorazepam (ATIVAN) 1 MG tablet     mirtazapine (REMERON) 45 MG tablet Take 1 tablet (45 mg total) by mouth every evening.    ondansetron (ZOFRAN) 8 MG tablet Take 1 tablet (8 mg total) by mouth every 6 (six) hours as needed.    oxycodone (ROXICODONE) 10 mg Tab immediate release tablet Take 1-2 tablets (10-20 mg total) by mouth every 6 (six) hours as needed for Pain.    oxycodone (ROXICODONE) 5 MG immediate release tablet Take 2 tablets (10 mg total) by mouth every 6 (six) hours as needed for Pain.    pregabalin (LYRICA) 50 MG capsule Take 1 capsule (50 mg total) by mouth once daily.    promethazine (PHENERGAN) 25 MG tablet     quetiapine (SEROQUEL) 100 MG Tab Take 1 tablet (100 mg total) by mouth every evening.    senna-docusate 8.6-50 mg (PERICOLACE) 8.6-50 mg per tablet Take 2 tablets by mouth 2 (two) times daily.    sertraline (ZOLOFT) 50 MG tablet     sucralfate (CARAFATE) 1 gram tablet Take 1 g by mouth 4 (four) times daily.     Psychotherapeutics     Start     Stop Route Frequency Ordered    01/03/18 2100  sertraline tablet 50 mg      -- Oral Nightly 01/03/18 1031    01/01/18 2100  mirtazapine tablet 30 mg      -- Oral Nightly 01/01/18 1904    12/31/17 2100  QUEtiapine tablet 100 mg      -- Oral Nightly 12/31/17 1538    12/30/17 2207  LORazepam tablet 2 mg      -- Oral Every 6 hours PRN 12/30/17 2107        Review of Systems    Strengths and Liabilities: Strength: Patient is expressive/articulate., Liability: Patient is defensive., Liability: Patient lacks coping skills.    Objective:     Vital Signs (Most Recent):  Temp:  "98.1 °F (36.7 °C) (01/03/18 0805)  Pulse: 70 (01/03/18 0805)  Resp: 18 (01/03/18 0805)  BP: 130/77 (01/03/18 0805)  SpO2: 98 % (01/03/18 0805) Vital Signs (24h Range):  Temp:  [97.6 °F (36.4 °C)-98.4 °F (36.9 °C)] 98.1 °F (36.7 °C)  Pulse:  [] 70  Resp:  [18] 18  SpO2:  [96 %-99 %] 98 %  BP: (121-147)/(77-95) 130/77     Height: 5' 4" (162.6 cm)  Weight: 63.5 kg (139 lb 15.9 oz)  Body mass index is 24.03 kg/m².    No intake or output data in the 24 hours ending 01/03/18 1033    Physical Exam     Appearance: AKA, fair grooming/hygeine wearing hospital gown in NAD; diaphoretic, dilated pupils, tremulous  Behavior: somewhat agitated, defensive, minimizing  Speech: normal rate, tone, and volume  Mood: depressed  Affect: anxious  Thought Process: linear  Thought Perceptions: denied AVH  Thought Content: denied SI, HI; no delusions apparent  Sensorium: awake, alert  Attention/Concentration: intact to conversation  Orientation: person, place, time, and situation  Memory: intact (recent, remote)  Abstraction: intact   Insight: poor  Judgment: good         Significant Labs:   Recent Results (from the past 48 hour(s))   Acetaminophen level    Collection Time: 01/01/18  4:45 PM   Result Value Ref Range    Acetaminophen (Tylenol), Serum <3.0 (L) 10.0 - 20.0 ug/mL   Comprehensive metabolic panel    Collection Time: 01/02/18  4:19 AM   Result Value Ref Range    Sodium 139 136 - 145 mmol/L    Potassium 3.3 (L) 3.5 - 5.1 mmol/L    Chloride 106 95 - 110 mmol/L    CO2 25 23 - 29 mmol/L    Glucose 87 70 - 110 mg/dL    BUN, Bld 12 6 - 20 mg/dL    Creatinine 0.7 0.5 - 1.4 mg/dL    Calcium 9.5 8.7 - 10.5 mg/dL    Total Protein 7.2 6.0 - 8.4 g/dL    Albumin 3.6 3.5 - 5.2 g/dL    Total Bilirubin 0.3 0.1 - 1.0 mg/dL    Alkaline Phosphatase 105 55 - 135 U/L    AST 33 10 - 40 U/L    ALT 37 10 - 44 U/L    Anion Gap 8 8 - 16 mmol/L    eGFR if African American >60.0 >60 mL/min/1.73 m^2    eGFR if non African American >60.0 >60 mL/min/1.73 " m^2   Magnesium    Collection Time: 01/02/18  4:19 AM   Result Value Ref Range    Magnesium 2.2 1.6 - 2.6 mg/dL   Phosphorus    Collection Time: 01/02/18  4:19 AM   Result Value Ref Range    Phosphorus 3.2 2.7 - 4.5 mg/dL   CBC auto differential    Collection Time: 01/02/18  4:19 AM   Result Value Ref Range    WBC 7.16 3.90 - 12.70 K/uL    RBC 4.00 4.00 - 5.40 M/uL    Hemoglobin 11.9 (L) 12.0 - 16.0 g/dL    Hematocrit 34.1 (L) 37.0 - 48.5 %    MCV 85 82 - 98 fL    MCH 29.8 27.0 - 31.0 pg    MCHC 34.9 32.0 - 36.0 g/dL    RDW 11.9 11.5 - 14.5 %    Platelets 324 150 - 350 K/uL    MPV 10.1 9.2 - 12.9 fL    Immature Granulocytes 1.3 (H) 0.0 - 0.5 %    Gran # 4.9 1.8 - 7.7 K/uL    Immature Grans (Abs) 0.09 (H) 0.00 - 0.04 K/uL    Lymph # 1.4 1.0 - 4.8 K/uL    Mono # 0.6 0.3 - 1.0 K/uL    Eos # 0.1 0.0 - 0.5 K/uL    Baso # 0.03 0.00 - 0.20 K/uL    nRBC 0 0 /100 WBC    Gran% 68.5 38.0 - 73.0 %    Lymph% 20.0 18.0 - 48.0 %    Mono% 8.4 4.0 - 15.0 %    Eosinophil% 1.4 0.0 - 8.0 %    Basophil% 0.4 0.0 - 1.9 %    Differential Method Automated    Acetaminophen level    Collection Time: 01/02/18  4:19 AM   Result Value Ref Range    Acetaminophen (Tylenol), Serum <3.0 (L) 10.0 - 20.0 ug/mL   Protime-INR    Collection Time: 01/02/18  4:19 AM   Result Value Ref Range    Prothrombin Time 11.3 9.0 - 12.5 sec    INR 1.1 0.8 - 1.2   Tricyclic Screen, Serum    Collection Time: 01/02/18  4:19 AM   Result Value Ref Range    TCA Scrn 211.5 See Text ng/mL   Comprehensive metabolic panel    Collection Time: 01/03/18  6:22 AM   Result Value Ref Range    Sodium 142 136 - 145 mmol/L    Potassium 3.3 (L) 3.5 - 5.1 mmol/L    Chloride 107 95 - 110 mmol/L    CO2 25 23 - 29 mmol/L    Glucose 94 70 - 110 mg/dL    BUN, Bld 12 6 - 20 mg/dL    Creatinine 0.7 0.5 - 1.4 mg/dL    Calcium 9.7 8.7 - 10.5 mg/dL    Total Protein 7.3 6.0 - 8.4 g/dL    Albumin 3.6 3.5 - 5.2 g/dL    Total Bilirubin 0.2 0.1 - 1.0 mg/dL    Alkaline Phosphatase 111 55 - 135 U/L     AST 27 10 - 40 U/L    ALT 45 (H) 10 - 44 U/L    Anion Gap 10 8 - 16 mmol/L    eGFR if African American >60.0 >60 mL/min/1.73 m^2    eGFR if non African American >60.0 >60 mL/min/1.73 m^2   Magnesium    Collection Time: 01/03/18  6:22 AM   Result Value Ref Range    Magnesium 2.0 1.6 - 2.6 mg/dL   Phosphorus    Collection Time: 01/03/18  6:22 AM   Result Value Ref Range    Phosphorus 3.1 2.7 - 4.5 mg/dL   CBC auto differential    Collection Time: 01/03/18  6:22 AM   Result Value Ref Range    WBC 5.58 3.90 - 12.70 K/uL    RBC 4.01 4.00 - 5.40 M/uL    Hemoglobin 11.9 (L) 12.0 - 16.0 g/dL    Hematocrit 33.9 (L) 37.0 - 48.5 %    MCV 85 82 - 98 fL    MCH 29.7 27.0 - 31.0 pg    MCHC 35.1 32.0 - 36.0 g/dL    RDW 11.9 11.5 - 14.5 %    Platelets 321 150 - 350 K/uL    MPV 9.6 9.2 - 12.9 fL    Immature Granulocytes 1.6 (H) 0.0 - 0.5 %    Gran # 3.9 1.8 - 7.7 K/uL    Immature Grans (Abs) 0.09 (H) 0.00 - 0.04 K/uL    Lymph # 0.9 (L) 1.0 - 4.8 K/uL    Mono # 0.7 0.3 - 1.0 K/uL    Eos # 0.0 0.0 - 0.5 K/uL    Baso # 0.02 0.00 - 0.20 K/uL    nRBC 0 0 /100 WBC    Gran% 69.9 38.0 - 73.0 %    Lymph% 15.2 (L) 18.0 - 48.0 %    Mono% 12.4 4.0 - 15.0 %    Eosinophil% 0.5 0.0 - 8.0 %    Basophil% 0.4 0.0 - 1.9 %    Differential Method Automated    Protime-INR    Collection Time: 01/03/18  6:22 AM   Result Value Ref Range    Prothrombin Time 11.5 9.0 - 12.5 sec    INR 1.1 0.8 - 1.2   Tricyclic Screen, Serum    Collection Time: 01/03/18  6:22 AM   Result Value Ref Range    TCA Scrn 185.1 See Text ng/mL      No results found for: PHENYTOIN, PHENOBARB, VALPROATE, CBMZ      Significant Imaging: I have reviewed all pertinent imaging results/findings within the past 24 hours.

## 2018-01-03 NOTE — PROGRESS NOTES
"[facility]  Psychiatry  Progress Note    Patient Name: Val Gil  MRN: [id number]   Code Status: Full Code  Admission Date: 12/28/2017  Hospital Length of Stay: 6 days  Expected Discharge Date: 1/3/2018  Attending Physician: Bahman Yee MD  Primary Care Provider: [facility]    Current Legal Status: [facility]    Patient information was obtained from patient and parent.     Subjective:     Principal Problem:Suicide attempt by multiple drug overdose    Chief Complaint: Opiate use disorder     HPI: Val Gil is a 37 y.o. female with past psychiatric history of GLORIA, Depression and Adjustment d/o and PMH of Sarcoma s/p R AKA 2015 who was BIB EMS after intentional overdose in suicide attempt.    Per ED MD note:  History gained from EMS and from patient's mother and from a note that was left by the patient at bedside. Mother reports that patient was home during the day and did not know about patient states until she arrived home at 1800 hrs.  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  Additionally the mother reports here that she was on Percocets but that was eventually shifted to [place].  Her mother also the patient began using heroin in the last month approximately. Patient's note said that she was upset about her heroin addiction, that she had some "speed" that she intended to use as a treatment for her heroin addiction.  The noted initially stated that she applied some toxic substance on the day closed blankets sheets to keep staff away from her.  There was a note additionally under her shirt and said DO NOT RESUSCITATE me I want to die.  Bladder pages of " "her longer noted airbag stated that she asked her father to wash the sheets and blankets so that her dog would not be exposed to whatever toxin that she had spread in the room-there was certainly no further information about what this toxic substance was. EMS reported the presence of multiple police rescue at the bedside already concerned rightly for the problem of hazardous exposures from a possible toxin at bedside.  Patient was poorly responsive as noted requiring painful stimuli to get arousal - no verbal response was detected.     Critical care was consulted after patient could not be aroused after initial evaluation in the ED. Per their note, she was given one dose of Narcan in ED, after which she began to awake. She still had slurred speech and would not engage in conversation or follow commands. They "spoke to patient's father who is unfamiliar with patient's medications, however he gave mother's number. When we tried to contact mother, number was disconnected. Also attempted to contact patient's cell phone, but this number was also disconnected. Patient's home meds include amphetamine salt, clonidine, flexaril, cyproheptadine, valium, fentanyl, lamictal, ativan, remeron, zofran, oxycodone, lyrica, seroquil, and zoloft. Review of  shows regular prescriptions for oxycodone 20 mg dispense 360 tablets q 30 days, valium 10 mg dispense 30 tablets q 30 days, lorazepam 1 mg dispense 90 tablets q 30 days, fentanyl patches as well. This goes as far back as 1 calendar year."    On attempt to interview, patient sleeping on ED stretcher, unable to arouse with verbal stimuli or gentle leg shaking. No family present at bedside.    Per chart review, patient previously seen by psychiatry in 2015 for medication management during admission when she received AKA. She was on Lamictal, Remeron, Seroquel, Zoloft at that time; Zoloft was eventually changed to [place]. Ativan was used PRN anxiety. Patient did not require " "inpatient psychiatric hospitalization at that time.     Interval update 12/31/17: Pt agitated, shivering, pupils dilated, diaphoretic. Pt c/o opiate withdrawal at this time. She is anxious with irritable mood. Pt states multiple life stressors related to her cancer and AKA that have been building for past few years. Pt and her father were kicked out of their rental house ~1 month ago which was "the straw that broke the camel's back." Pt had been thinking of and planning her suicide for 1 week prior to the attempt. Pt becomes tearful many times during interview, and emotionally states "I didn't want to.. Life was just too much. I'm tired of living in pain." Pt may be unreliable, as she denies heroin use (despite admitting it in suicide note) and does not report accurate dose/frequency of her pain regimen. Pt takes Lamictal 75mg qd, Zoloft 100mg qd, Seroquel 100mg qhs, and Remeron 45mg qhs for her depression. Pt again becomes tearful at conclusion of interview, stating that she is "so tired and burnt out on life." Pt minimizes her suicide attempt and drug use and does not think she needs inpatient psych.    Collateral: mother Dilip Reis, [phone]. Rang once and went to voicemail.    Per psychiatry consult note 7/15/15, information will be updated as able:  Past Psychiatric History:   Previous Psychiatric Hospitalizations: No   Previous Medication Trials: Yes  Previous Suicide Attempts: No   History of Violence: No   Outpatient psychiatrist: Dr. 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  Patient and mother wished to discuss options for Subutex/Suboxone.  Patient was counseled that the addiction team could see her in morning to discuss all available options.  Patient states that she is interested in Subutex outpatient treatment for pain management, and was counseled that Psychiatrists use these medications as tools for opiate use disorder along with addiction treatment, not to treat pain.  Also counseled that Subutex outpatient treatment was unlikely.  Patient feels that pain management specialists are unattainable due to price and is not interested in addiction treatment, but was agreeable to meeting with the Addiction consult team tomorrow morning.  Patient was also dissatisfied that her psychotropic medications were restarted at low doses.  She feels her depression and anxiety are "out of control" and slept poorly last night.    1/2: This morning pt reports she is not doing well because her room is so cold. Pt reports she has had trouble sleeping 2/2 this. She has been unable to take a shower 2/2 hot water not working in her room. Pt is requesting valium and ativan for her anxiety. She says she wants to go to [facility] so she can be treated with Subutex.     1/3: This morning pt reports she is more comfortable as she has been moved to a different room that is much warmer. Pt reports she did not sleep well last night because there was too much noise and people coming in and out of the room. Also complains of psychiatric medications not yet being at home doses. Pt otherwise doing okay this morning. She is planning to go to [facility] or [place]. Pt telling me she is being " discharged today.     1/3/17: Pt seen for addiction psychiatry visit this morning to determine appropriateness for initiating subutex/suboxone. Pt seen in room without mother present. Pt reports that she has began taking oral opiates for pain control related to her sarcoma. About 8 to 9 months ago, pt was having increased tolerance to pain medications and increased her frequency of use to the point that she was running out of medications about 3 weeks into the month. Pt began supplementing with heroin to avoid withdrawal and to address pain complaints.  Since that time pt has increased her use of heroin to about 1g/ $100 of heroin intranasally a day in addition to taking oral oxycodone. Pt reported taking only 12 of her 20mg oxycodone pills a day during this visit. P  Pt also admits that she has been taking ativan 1mg tid and valium 10mg daily for control of anxiety symptoms. Pt reports that she has been on some type of benzodiazapine for about 15 yrs. Pt admits that she has also been buying diverted benzos when she runs out of her prescribed medications.   Discussed initiation of buprenorphine product at length with pt. Pt reports that she is very anxious and does not think that she can come off of benzodiazepines. Risk of overdose with combination of benzos and buprenorphine discussed explicitly, pt reports that she had a similar discussion with providers at [facility] and was off benzodiazepines during a brief period of using suboxone in 2015. Pt also request use of subutex only, reports that when she was on suboxone in the past she felt lethargic and had a poor appetite. Also discussed concerns about pain control, when on suboxone in the past pain was not completely controlled.     Also spoke with pt's mother about legal status, transition to an inpatient psych unit. Substance abuse not discussed as per pt's request.         EXAMINATION    VITALS   Vitals:    01/03/18 1114   BP: (!) 148/90   Pulse: 68   Resp: 18    Temp: 97.9 °F (36.6 °C)       CONSTITUTIONAL  General Appearance: laying in bed, in no acute distress     MUSCULOSKELETAL  Muscle Strength and Tone: right leg absent  Gait and Station: abnormal, hopping on left leg    PSYCHIATRIC   Level of Consciousness: awake and alert   Orientation: oriented to person, place, situation   Grooming: fair  Psychomotor Behavior: no PMR or PMA noted   Speech: regular rate and volume   Language: fluent english  Mood:  Anxious   Affect: mood congruent   Thought Process: linear, goal oriented   Associations: intact   Thought Content: no delusional or psychotic thought content   Memory: intact   Attention: attentive to interview   Fund of Knowledge: estimated to be average   Insight: poor   Judgment: poor     Assessment/Plan:     * Suicide attempt by multiple drug overdose: agree with plan as per General Psych team:     - Agree with PEC in setting of deliberate suicide attempt, will seek inpatient placement once medically cleared, currently being admitted from ED to ICU. At this time concern for patient developing possible serotonin syndrome, NMS, tylenol toxicity and TCA toxicity. Overdose on Seroquel also likely given prolonged Qtc (since ED arrival 453 -> 497).  - Per her suicide note recent use of heroin and methamphetamines, UDS + opiates and benzos  - Home regimen per ICU team amphetamine salts, clonidine, flexaril, cyproheptadine, valium, fentanyl, lamictal, ativan, remeron, zofran, oxycodone, lyrica, seroquel, and zoloft.   - Per primary team: Review of  shows regular prescriptions for oxycodone 20 mg dispense 360 tablets q 30 days, valium 10 mg dispense 30 tablets q 30 days, lorazepam 1 mg dispense 90 tablets q 30 days, fentanyl patches as well. This goes as far back as 1 calendar year.  - Continue home psychotropic medications, increase Remeron to 45 mg po qhs for mood and sleep, and to gain therapeutic alliance with the patient.   - May use Ativan 2mg PO or IM q6 hours PRN  severe anxiety; would avoid using antipsychotics     Plan:   - Addiction psych to meet with pt today.   - Continue PEC/CEC for danger to self and transfer to inpatient psych once medically cleared  - Please give Zoloft QHS            Opioid dependence with intoxication    -Pt not appropriate for transition to suboxone at this time. Suboxone can be helpful for pain but would not likely be sufficient for patient's needs.   -Also patient demonstrating poor insight and judgement about substance abuse, very resistant to discussing any alternative treatments for anxiety to reduce benzodiazepine use   -Logistically, unclear where pt will be transferred for further psychiatric care, may not be able to continue use of suboxone/subtex in other treatment settings or maintain follow-up for maintenance  -Can continue with comfort          Benzodiazapine Dependence  -Would discontinue use of ativan 2mg for anxiety. Pt received 10mg of ativan over past 24 hours  -Would use Ativan 2mg only for autonomic insatiability, for SBP>150, DBP> 100 or HR >110  -Continue to monitor for benzodiazepine withdrawal         Need for Continued Hospitalization:   Psychiatric illness continues to pose a potential threat to life or bodily function, of self or others, thereby requiring the need for continued inpatient psychiatric hospitalization.    Anticipated Disposition: Psychiatric Hospital     Total time:  35 with greater than 50% of this time spent in counseling and/or coordination of care.       Pt seen and discussed with Dr. Doyle, staff psychiatrist.     Yaritza Fonseca MD   Psychiatry  [facility]

## 2018-01-03 NOTE — ASSESSMENT & PLAN NOTE
- Agree with PEC in setting of deliberate suicide attempt, will seek inpatient placement once medically cleared, currently being admitted from ED to ICU. At this time concern for patient developing possible serotonin syndrome, NMS, tylenol toxicity and TCA toxicity. Overdose on Seroquel also likely given prolonged Qtc (since ED arrival 453 -> 497).  - Per her suicide note recent use of heroin and methamphetamines, UDS + opiates and benzos  - Home regimen per ICU team amphetamine salts, clonidine, flexaril, cyproheptadine, valium, fentanyl, lamictal, ativan, remeron, zofran, oxycodone, lyrica, seroquel, and zoloft.   - Per primary team: Review of  shows regular prescriptions for oxycodone 20 mg dispense 360 tablets q 30 days, valium 10 mg dispense 30 tablets q 30 days, lorazepam 1 mg dispense 90 tablets q 30 days, fentanyl patches as well. This goes as far back as 1 calendar year.  - Continue home psychotropic medications, increase Remeron to 45 mg po qhs for mood and sleep, and to gain therapeutic alliance with the patient.   - May use Ativan 2mg PO or IM q6 hours PRN severe anxiety; would avoid using antipsychotics     Plan:   - Addiction psych to meet with pt today.   - Continue PEC/CEC for danger to self and transfer to inpatient psych once medically cleared  - Please give Zoloft QHS

## 2018-01-03 NOTE — ASSESSMENT & PLAN NOTE
-Pt not appropriate for transition to suboxone at this time. Suboxone can be helpful for pain but would not likely be sufficient for patient's needs.   -Would consult pain management for complete evaluation of pain symptoms and appropriate

## 2018-01-03 NOTE — PLAN OF CARE
Inpatient Psych placement in progress (refer to [facility]). [facility] unable to meet pt's needs and has denied admission. Pt was a previous pt at [facility] and [facility]. SW will follow up with these two facilities since they are both familiar with taking care of pt in the past

## 2018-01-03 NOTE — PROGRESS NOTES
"[facility] Hospital Medicine  Progress Note    Patient Name: Val Gil  MRN: [id number]  Patient Class: IP- Inpatient   Admission Date: 12/28/2017  Length of Stay: 5 days  Attending Physician: David London MD  Primary Care Provider: [facility] Medicine Team: [facility] HOSP MED X David London MD    Subjective:     Principal Problem:Suicide attempt by multiple drug overdose    HPI:  38 y/o woman who presents after a suicide attempt and subsequent drug overdose. Pt has an opioid addiction and thought she could cure herself by using methamphetamine.  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 She has taken a lot of pills however we are unsure as to all of what she has taken. EMS did not collect any bottles or pills. Per her mother, she does take Ambien. At the scene a bag of notes were found that said "I have put some toxic substance on blanks and sheets. Do not resuscitate me. This is my time." In these notes, speed and opiates were mentioned. Pt also stuffed notes into her clothing that said "do not resuscitate me." Her father did notice some vomiting earlier today. When her mother arrived at the scene, pt had slurred speech and was subjectively unresponsive. Pt is now being decontaminated.  Critical care was consulted after patient could not be aroused after initial evaluation in the ED. She was given one dose of Narcan, after which she began to awake. She still had slurred speech and would not engage in conversation or follow commands. Will call family to obtain more collateral.        Hospital Course:  Patient was admitted to the ICU for suicide attempt with drug overdose. After monitoring overnight patient was transferred to the floor. Bicarbonate drip started in the ICU but later discontinued. Antipsychotics held by Psychiatry consult.  Zoloft, Remeron, Lamictal and Seroquel,subsequently restarted. Psychiatry had long conversation " with patient and family at bedside, who is still tearful and frustrated as to the expected length of stay and the impending transfer to an inpatient Psychiatric facility. Patient is actively complaining of pain and discomfort. Started on home meds. Patient expressed interest in transferring to [facility] or [facility] for treatment of both her depression and addiction.     Interval History: Patient seen and examined at bedside. Reports some mild improvement in symptoms with heat pads but still reports pain is high. Possible transfer to inpatient Psychiatry facility tomorrow    Review of Systems   Constitutional: Negative for chills and fatigue.   HENT: Positive for drooling. Negative for dental problem.    Eyes: Negative for pain and discharge.   Respiratory: Negative for apnea, cough and chest tightness.    Cardiovascular: Negative for chest pain.   Gastrointestinal: Negative for abdominal distention and abdominal pain.   Genitourinary: Negative for enuresis and flank pain.   Musculoskeletal: Negative for gait problem and joint swelling.   Neurological: Negative for seizures and syncope.   Psychiatric/Behavioral: Positive for agitation, behavioral problems and suicidal ideas. The patient is nervous/anxious.      Objective:     Vital Signs (Most Recent):  Temp: 98 °F (36.7 °C) (01/02/18 1940)  Pulse: 83 (01/02/18 1940)  Resp: 18 (01/02/18 1940)  BP: (!) 135/95 (01/02/18 1940)  SpO2: 98 % (01/02/18 1940) Vital Signs (24h Range):  Temp:  [97.6 °F (36.4 °C)-98.5 °F (36.9 °C)] 98 °F (36.7 °C)  Pulse:  [] 83  Resp:  [16-18] 18  SpO2:  [97 %-99 %] 98 %  BP: (132-152)/(82-95) 135/95     Weight: 63.5 kg (139 lb 15.9 oz)  Body mass index is 24.03 kg/m².    Intake/Output Summary (Last 24 hours) at 01/02/18 2149  Last data filed at 01/02/18 0600   Gross per 24 hour   Intake              240 ml   Output                0 ml   Net              240 ml      Physical Exam   Constitutional: She appears well-developed and  well-nourished.   HENT:   Head: Normocephalic.   Mouth/Throat: No oropharyngeal exudate.   Eyes: No scleral icterus.   Neck: Normal range of motion. No thyromegaly present.   Cardiovascular: Normal rate, regular rhythm, normal heart sounds and intact distal pulses.  Exam reveals no gallop and no friction rub.    No murmur heard.  Pulmonary/Chest: Effort normal and breath sounds normal. No respiratory distress. She has no wheezes. She exhibits no tenderness.   Abdominal: Soft. She exhibits no distension. There is no tenderness.   Musculoskeletal:   Patient with right AKA   Skin: She is not diaphoretic.       Significant Labs:   CBC:     Recent Labs  Lab 01/01/18  0550 01/02/18  0419   WBC 7.02 7.16   HGB 12.1 11.9*   HCT 35.0* 34.1*   * 324     CMP:     Recent Labs  Lab 01/01/18  0550 01/02/18 0419    139   K 3.6 3.3*    106   CO2 24 25   GLU 93 87   BUN 12 12   CREATININE 0.8 0.7   CALCIUM 10.0 9.5   PROT 7.4 7.2   ALBUMIN 3.7 3.6   BILITOT 0.4 0.3   ALKPHOS 106 105   AST 21 33   ALT 27 37   ANIONGAP 8 8   EGFRNONAA >60.0 >60.0     Coagulation:     Recent Labs  Lab 01/02/18 0419   INR 1.1     Lactic Acid: No results for input(s): LACTATE in the last 48 hours.  Lipase: No results for input(s): LIPASE in the last 48 hours.  Magnesium:     Recent Labs  Lab 01/01/18 0550 01/02/18 0419   MG 2.0 2.2       Significant Imaging: I have reviewed and interpreted all pertinent imaging results/findings within the past 24 hours.    Assessment/Plan:      * Suicide attempt by multiple drug overdose    -Patient ingested unknown amount as well as identity of multiple substances  -UDS positive for benzos and opiates  -Will contact family for more information  -Patient currently PEC'd  -Psychiatry consulted  -Patient's home meds include amphetamine salt, clonidine, flexaril, cyproheptadine, valium, fentanyl, lamictal, ativan, remeron, zofran, oxycodone, lyrica, seroquil, and zoloft  -Salicylate level  <5.0  -Acetaminophen level normal  - Addiction Psychiatry consulted   - Awaiting placement to Inpatient Psychiatry unit, will need PT/Ot prior   -Patient started on NAC, will continue to trend acetominophen levels q 12 hours  -Liver enzymes not elevated  - daily TCA levels normal   -Spoke to patient's father who is unfamiliar with patient's medications, however he gave us mother's number. When we tried to contact mother, number was disconnected. Also attempted to contact patient's cell phone, but this number was also disconnected   -EKG showed sinus tachycardia,   -Review of  shows regular prescriptions for oxycodone 20 mg dispense 360 tablets q 30 days, valium 10 mg dispense 30 tablets q 30 days, lorazepam 1 mg dispense 90 tablets q 30 days, fentanyl patches as well. This goes as far back as 1 calendar year.  - Restart  Zoloft, Remeron, Lamictal and Seroquel on 12/31/17  - Remeron increased to 30mg QHS on 01/02/18        Encephalopathy, toxic    Resolved           Tricyclic overdose, intentional self-harm, initial encounter     >500.0  312.5  TCA Scrn   On Bicarbonate drip while in the ICU, discontinued due to metabolic alkalosis             Acetaminophen overdose, intentional self-harm, initial encounter    Tylenol levels normal, 21 on admission          Benzodiazepine dependence    Ativan PRN          Leucocytosis    Resolved           Anxiety    -Patient known to take benzodiazepenes for anxiety  -Will ensure seizure precautions as well as frequent neuro checks  -Will order ativan prn for withdrawals         Opioid dependence with intoxication    -Patient presented in obtunded state with pinpoint pupils, GCS <8  -Upon administration of Narcan, patient began to wake up, GCS of 9, currently with nasal trumpet tolerating well  - avoid opioids for now  - treat withdrawal symptomatically with clonidine prn        Impaired mobility and ADLs    - seen by PT/OT, they have cleared patient to go to rehab            VTE Risk Mitigation         Ordered     enoxaparin injection 40 mg  Daily     Route:  Subcutaneous        12/29/17 0051     Medium Risk of VTE  Once      12/29/17 0102     Place sequential compression device  Until discontinued      12/29/17 0102              David London MD  Department of Hospital Medicine   [facility]

## 2018-01-03 NOTE — PLAN OF CARE
Problem: Patient Care Overview  Goal: Plan of Care Review  Outcome: Ongoing (interventions implemented as appropriate)  Pt free of falls or injury during shift. VSS, afebrile, AAOx4. Pain assessed and treated per MD orders. Bed locked and in lowest position, side rails raised x2, call light within reach, sitter at bedside. Will continue to monitor.

## 2018-01-03 NOTE — PLAN OF CARE
[id number] - [place], [place] - [address] AT [address]  [address]  [place] [address]  Phone: [phone] Fax: [phone]    [facility] [place], [place] - [address]  [address] [address]  Phone: [phone] Fax: [phone]      Payor: MEDICAID / Plan: [employer] [facility] ([facility]) / Product Type: Managed Medicaid /        01/02/18 1208   Discharge Assessment   Assessment Type Discharge Planning Assessment   Confirmed/corrected address and phone number on facesheet? Yes   Assessment information obtained from? Patient;Other  (mother at her bedside)   Prior to hospitilization cognitive status: Unable to Assess   Prior to hospitalization functional status: Assistive Equipment   Current cognitive status: Alert/Oriented   Current Functional Status: Assistive Equipment;Needs Assistance   Lives With parent(s)  (father)   Able to Return to Prior Arrangements no   Is patient able to care for self after discharge? No   Who are your caregiver(s) and their phone number(s)? (Artemio Gil  father [phone])   Patient's perception of discharge disposition psychiatric hospital   Equipment Currently Used at Home crutches, axillary;wheelchair   Discharge Plan A Psychiatric hospital  (PEC/ [facility])   Discharge Plan B Psychiatric hospital   Patient/Family In Agreement With Plan yes

## 2018-01-03 NOTE — PLAN OF CARE
Problem: Patient Care Overview  Goal: Plan of Care Review  Outcome: Ongoing (interventions implemented as appropriate)  Patient AAOx4. Patient VSS. Patient complains of body pain; pain managed with PRN medications. Patient free from falls or injury during shift. Patient repositioned freely. Patient in bed, bed in lowest position, call light in reach, bed alarm set, and personal items at bedside. Will continue to monitor.

## 2018-01-04 VITALS
RESPIRATION RATE: 18 BRPM | BODY MASS INDEX: 23.9 KG/M2 | TEMPERATURE: 99 F | HEIGHT: 64 IN | HEART RATE: 80 BPM | SYSTOLIC BLOOD PRESSURE: 138 MMHG | WEIGHT: 140 LBS | DIASTOLIC BLOOD PRESSURE: 91 MMHG | OXYGEN SATURATION: 98 %

## 2018-01-04 LAB
ALBUMIN SERPL BCP-MCNC: 3.6 G/DL
ALP SERPL-CCNC: 105 U/L
ALT SERPL W/O P-5'-P-CCNC: 34 U/L
ANION GAP SERPL CALC-SCNC: 12 MMOL/L
AST SERPL-CCNC: 19 U/L
BASOPHILS # BLD AUTO: 0.02 K/UL
BASOPHILS NFR BLD: 0.4 %
BILIRUB SERPL-MCNC: 0.2 MG/DL
BUN SERPL-MCNC: 13 MG/DL
CALCIUM SERPL-MCNC: 9.5 MG/DL
CHLORIDE SERPL-SCNC: 102 MMOL/L
CO2 SERPL-SCNC: 26 MMOL/L
CREAT SERPL-MCNC: 0.8 MG/DL
DIFFERENTIAL METHOD: ABNORMAL
EOSINOPHIL # BLD AUTO: 0 K/UL
EOSINOPHIL NFR BLD: 0.2 %
ERYTHROCYTE [DISTWIDTH] IN BLOOD BY AUTOMATED COUNT: 12.1 %
EST. GFR  (AFRICAN AMERICAN): >60 ML/MIN/1.73 M^2
EST. GFR  (NON AFRICAN AMERICAN): >60 ML/MIN/1.73 M^2
GLUCOSE SERPL-MCNC: 88 MG/DL
HCT VFR BLD AUTO: 35 %
HGB BLD-MCNC: 12.3 G/DL
IMM GRANULOCYTES # BLD AUTO: 0.07 K/UL
IMM GRANULOCYTES NFR BLD AUTO: 1.3 %
INR PPP: 1.1
LYMPHOCYTES # BLD AUTO: 1 K/UL
LYMPHOCYTES NFR BLD: 18.5 %
MAGNESIUM SERPL-MCNC: 2.1 MG/DL
MCH RBC QN AUTO: 30 PG
MCHC RBC AUTO-ENTMCNC: 35.1 G/DL
MCV RBC AUTO: 85 FL
MONOCYTES # BLD AUTO: 0.8 K/UL
MONOCYTES NFR BLD: 15.1 %
NEUTROPHILS # BLD AUTO: 3.4 K/UL
NEUTROPHILS NFR BLD: 64.5 %
NRBC BLD-RTO: 0 /100 WBC
PHOSPHATE SERPL-MCNC: 3.5 MG/DL
PLATELET # BLD AUTO: 330 K/UL
PMV BLD AUTO: 9.8 FL
POTASSIUM SERPL-SCNC: 3.6 MMOL/L
PROT SERPL-MCNC: 7.4 G/DL
PROTHROMBIN TIME: 11.8 SEC
RBC # BLD AUTO: 4.1 M/UL
SODIUM SERPL-SCNC: 140 MMOL/L
WBC # BLD AUTO: 5.29 K/UL

## 2018-01-04 PROCEDURE — S4991 NICOTINE PATCH NONLEGEND: HCPCS | Performed by: PHYSICIAN ASSISTANT

## 2018-01-04 PROCEDURE — 25000003 PHARM REV CODE 250: Performed by: HOSPITALIST

## 2018-01-04 PROCEDURE — 25000003 PHARM REV CODE 250: Performed by: PSYCHIATRY & NEUROLOGY

## 2018-01-04 PROCEDURE — 84100 ASSAY OF PHOSPHORUS: CPT

## 2018-01-04 PROCEDURE — 83735 ASSAY OF MAGNESIUM: CPT

## 2018-01-04 PROCEDURE — 93010 ELECTROCARDIOGRAM REPORT: CPT | Mod: ,,, | Performed by: INTERNAL MEDICINE

## 2018-01-04 PROCEDURE — 99233 SBSQ HOSP IP/OBS HIGH 50: CPT | Mod: AF,HB,, | Performed by: PSYCHIATRY & NEUROLOGY

## 2018-01-04 PROCEDURE — 85025 COMPLETE CBC W/AUTO DIFF WBC: CPT

## 2018-01-04 PROCEDURE — 93005 ELECTROCARDIOGRAM TRACING: CPT

## 2018-01-04 PROCEDURE — 99233 SBSQ HOSP IP/OBS HIGH 50: CPT | Mod: ,,, | Performed by: HOSPITALIST

## 2018-01-04 PROCEDURE — 25000003 PHARM REV CODE 250: Performed by: PHYSICIAN ASSISTANT

## 2018-01-04 PROCEDURE — 36415 COLL VENOUS BLD VENIPUNCTURE: CPT

## 2018-01-04 PROCEDURE — 80053 COMPREHEN METABOLIC PANEL: CPT

## 2018-01-04 PROCEDURE — 85610 PROTHROMBIN TIME: CPT

## 2018-01-04 RX ORDER — ONDANSETRON 4 MG/1
4 TABLET, ORALLY DISINTEGRATING ORAL EVERY 6 HOURS PRN
Status: DISCONTINUED | OUTPATIENT
Start: 2018-01-04 | End: 2018-01-04 | Stop reason: HOSPADM

## 2018-01-04 RX ORDER — LORAZEPAM 1 MG/1
1 TABLET ORAL 3 TIMES DAILY PRN
Refills: 0
Start: 2018-01-04

## 2018-01-04 RX ADMIN — PROMETHAZINE HYDROCHLORIDE 12.5 MG: 12.5 TABLET ORAL at 12:01

## 2018-01-04 RX ADMIN — CALCIUM CARBONATE (ANTACID) CHEW TAB 500 MG 500 MG: 500 CHEW TAB at 10:01

## 2018-01-04 RX ADMIN — NICOTINE 1 PATCH: 21 PATCH, EXTENDED RELEASE TRANSDERMAL at 11:01

## 2018-01-04 RX ADMIN — LORAZEPAM 2 MG: 1 TABLET ORAL at 12:01

## 2018-01-04 RX ADMIN — LAMOTRIGINE 25 MG: 25 TABLET ORAL at 12:01

## 2018-01-04 RX ADMIN — Medication 10 ML: at 11:01

## 2018-01-04 RX ADMIN — Medication 10 ML: at 02:01

## 2018-01-04 RX ADMIN — ONDANSETRON 4 MG: 4 TABLET, ORALLY DISINTEGRATING ORAL at 03:01

## 2018-01-04 NOTE — PHARMACY MED REC
"MedMined Medication Reconciliation  Template    Admission Medication Reconciliation - Pharmacy Consult Note    The home medication history was taken by Lakeisha Rice, Pharmacy Technician.  Based on information gathered and subsequent review by the clinical pharmacist, the items below may need attention.     You may go to "Admission" then "Reconcile Home Medications" tabs to review and/or act upon these items. Based on information gathered and subsequent review by the clinical pharmacist, the items below may need attention.    Potentially problematic discrepancies with current MAR  o Patient is taking a drug DIFFERENTLY than how ordered upon admit  o Sertraline 150mg po QHS   o Lamotrigine 75mg po daily  o Mirtazapine 45mg po every evening     Please address this information as you see fit.  Feel free to contact us if you have any questions or require assistance.    Buffy Caruso, PharmD, PGY-2 Internal Medicine Resident   EXT [id number]    Patient's prior to admission medication regimen was as follows:  Prescriptions Prior to Admission   Medication Sig Dispense Refill Last Dose    diazePAM (VALIUM) 10 MG Tab Take 10 mg by mouth once daily.   0 7/9/2016    fentaNYL (DURAGESIC) 100 mcg/hr Place 1 patch onto the skin every 72 hours.       LAMOTRIGINE (LAMICTAL ORAL) Take 75 mg by mouth daily       lorazepam (ATIVAN) 1 MG tablet Take 1 mg by mouth 3 (three) times daily.   0     mirtazapine (REMERON) 45 MG tablet Take 1 tablet (45 mg total) by mouth every evening. 30 tablet 1 7/8/2016    oxyCODONE (ROXICODONE) 20 mg Tab immediate release tablet Take 40 mg by mouth every 4 (four) hours as needed for Pain.       promethazine (PHENERGAN) 25 MG tablet Take 25 mg by mouth daily as needed for Nausea.   1 7/9/2016    quetiapine (SEROQUEL) 100 MG Tab Take 1 tablet (100 mg total) by mouth every evening. 30 tablet 1 7/8/2016    SERTRALINE HCL (SERTRALINE ORAL) Take 150 mg by mouth once daily.   1 7/8/2016 "         Please add appropriate    SmartPhrase below:

## 2018-01-04 NOTE — PROGRESS NOTES
Call placed to [facility], complete report given to LUZMA Bourgeois including PEC/CEC status.

## 2018-01-04 NOTE — PLAN OF CARE
NITA followed up with [facility] psych unit [phone], staff currently in treatment team meeting and SW will call this SW back to determine if they will be able to accept.     NITA then placed call to [facility] [phone], spoke to Marine who stated she has all of pt's clinicals and will call SW once they make a determination.     NITA will continue to follow

## 2018-01-04 NOTE — PROGRESS NOTES
"[facility]  Psychiatry  Progress Note    Patient Name: Val Gil  MRN: [id number]   Code Status: Full Code  Admission Date: 12/28/2017  Hospital Length of Stay: 7 days  Expected Discharge Date: 1/3/2018  Attending Physician: Bahman Yee MD  Primary Care Provider: [facility]    Current Legal Status: [facility]    Patient information was obtained from patient.     Subjective:     Principal Problem:Suicide attempt by multiple drug overdose    Chief Complaint: SA    HPI: Val Gil is a 37 y.o. female with past psychiatric history of GLORIA, Depression and Adjustment d/o and PMH of Sarcoma s/p R AKA 2015 who was BIB EMS after intentional overdose in suicide attempt.    Per ED MD note:  History gained from EMS and from patient's mother and from a note that was left by the patient at bedside. Mother reports that patient was home during the day and did not know about patient states until she arrived home at 1800 hrs.  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  Additionally the mother reports here that she was on Percocets but that was eventually shifted to [place].  Her mother also the patient began using heroin in the last month approximately. Patient's note said that she was upset about her heroin addiction, that she had some "speed" that she intended to use as a treatment for her heroin addiction.  The noted initially stated that she applied some toxic substance on the day closed blankets sheets to keep staff away from her.  There was a note additionally under her shirt and said DO NOT RESUSCITATE me I want to die.  Bladder pages of her longer noted airbag stated " "that she asked her father to wash the sheets and blankets so that her dog would not be exposed to whatever toxin that she had spread in the room-there was certainly no further information about what this toxic substance was. EMS reported the presence of multiple police rescue at the bedside already concerned rightly for the problem of hazardous exposures from a possible toxin at bedside.  Patient was poorly responsive as noted requiring painful stimuli to get arousal - no verbal response was detected.     Critical care was consulted after patient could not be aroused after initial evaluation in the ED. Per their note, she was given one dose of Narcan in ED, after which she began to awake. She still had slurred speech and would not engage in conversation or follow commands. They "spoke to patient's father who is unfamiliar with patient's medications, however he gave mother's number. When we tried to contact mother, number was disconnected. Also attempted to contact patient's cell phone, but this number was also disconnected. Patient's home meds include amphetamine salt, clonidine, flexaril, cyproheptadine, valium, fentanyl, lamictal, ativan, remeron, zofran, oxycodone, lyrica, seroquil, and zoloft. Review of  shows regular prescriptions for oxycodone 20 mg dispense 360 tablets q 30 days, valium 10 mg dispense 30 tablets q 30 days, lorazepam 1 mg dispense 90 tablets q 30 days, fentanyl patches as well. This goes as far back as 1 calendar year."    On attempt to interview, patient sleeping on ED stretcher, unable to arouse with verbal stimuli or gentle leg shaking. No family present at bedside.    Per chart review, patient previously seen by psychiatry in 2015 for medication management during admission when she received AKA. She was on Lamictal, Remeron, Seroquel, Zoloft at that time; Zoloft was eventually changed to Savella. Ativan was used PRN anxiety. Patient did not require inpatient psychiatric hospitalization " "at that time.     Interval update 12/31/17: Pt agitated, shivering, pupils dilated, diaphoretic. Pt c/o opiate withdrawal at this time. She is anxious with irritable mood. Pt states multiple life stressors related to her cancer and AKA that have been building for past few years. Pt and her father were kicked out of their rental house ~1 month ago which was "the straw that broke the camel's back." Pt had been thinking of and planning her suicide for 1 week prior to the attempt. Pt becomes tearful many times during interview, and emotionally states "I didn't want to.. Life was just too much. I'm tired of living in pain." Pt may be unreliable, as she denies heroin use (despite admitting it in suicide note) and does not report accurate dose/frequency of her pain regimen. Pt takes Lamictal 75mg qd, Zoloft 100mg qd, Seroquel 100mg qhs, and Remeron 45mg qhs for her depression. Pt again becomes tearful at conclusion of interview, stating that she is "so tired and burnt out on life." Pt minimizes her suicide attempt and drug use and does not think she needs inpatient psych.    Collateral: mother Dilip Reis, [phone]. Rang once and went to voicemail.    Per psychiatry consult note 7/15/15, information will be updated as able:  Past Psychiatric History:   Previous Psychiatric Hospitalizations: No   Previous Medication Trials: Yes  Previous Suicide Attempts: No   History of Violence: No   Outpatient psychiatrist: Dr. 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  Patient and mother wished to discuss options for Subutex/Suboxone.  Patient was counseled that the addiction team could see her in morning to discuss all available options.  Patient states that she is interested in Subutex outpatient treatment for pain management, and was counseled that Psychiatrists use these medications as tools for opiate use disorder along with addiction treatment, not to treat pain.  Also counseled that Subutex outpatient treatment was unlikely.  Patient feels that pain management specialists are unattainable due to price and is not interested in addiction treatment, but was agreeable to meeting with the Addiction consult team tomorrow morning.  Patient was also dissatisfied that her psychotropic medications were restarted at low doses.  She feels her depression and anxiety are "out of control" and slept poorly last night.    1/2: This morning pt reports she is not doing well because her room is so cold. Pt reports she has had trouble sleeping 2/2 this. She has been unable to take a shower 2/2 hot water not working in her room. Pt is requesting valium and ativan for her anxiety. She says she wants to go to [facility] so she can be treated with Subutex.     1/3: This morning pt reports she is more comfortable as she has been moved to a different room that is much warmer. Pt reports she did not sleep well last night because there was too much noise and people coming in and out of the room. Also complains of psychiatric medications not yet being at home doses. Pt otherwise doing okay this morning. She is planning to go to [facility] or [place]. Pt telling me she is being discharged today.     1/3/17: Pt seen " for addiction psychiatry visit this morning to determine appropriateness for initiating subutex/suboxone. Pt seen in room without mother present. Pt reports that she has began taking oral opiates for pain control related to her sarcoma. About 8 to 9 months ago, pt was having increased tolerance to pain medications and increased her frequency of use to the point that she was running out of medications about 3 weeks into the month. Pt began supplementing with heroin to avoid withdrawal. Since that time pt has increased her use of heroin to about 1g/ $100 of heroin intranasally a day in addition to taking oral oxycodone. Pt reported taking only 12 of her 20mg oxycodone pills a day during this visit- \    1/4/18: Pt reports she continues to have trouble sleeping. Pt is discouraged about not being a candidate for suboxone. Pt continues to wish to go to [facility] or [place] and is asking about the plans for getting there. No other complaints at this time.          Patient History           Medical as of 1/4/2018     Past Medical History     Diagnosis Date Comments Source    Angiosarcoma -- -- Provider    Angiosarcoma -- -- Provider    Anxiety -- -- Provider    Bone cancer -- -- Provider    Depression -- -- Provider          Pertinent Negatives     Diagnosis Date Noted Comments Source    Asthma 6/29/2015 -- Provider    COPD (chronic obstructive pulmonary disease) 6/29/2015 -- Provider    Diabetes mellitus 1/31/2015 -- Provider    Hypertension 6/29/2015 -- Provider                  Surgical as of 1/4/2018     Past Surgical History     Procedure Laterality Date Comments Source    LEG AMPUTATION AT HIP Left 7/2015 -- Provider                  Family as of 1/4/2018     Problem Relation Name Age of Onset Comments Source    Cancer Paternal Uncle great uncle -- lung Provider    Cancer Maternal Grandmother -- -- breast Provider    Heart attack Maternal Grandmother -- -- -- Provider    Arthritis Maternal Grandmother -- -- -- Provider     Heart attack Maternal Grandfather -- -- -- Provider    Arthritis Maternal Grandfather -- -- -- Provider    Diabetes Paternal Grandmother -- -- -- Provider    Hypertension Paternal Grandfather -- -- -- Provider    Heart attack Paternal Grandfather -- -- -- Provider    No Known Problems Father -- -- -- Provider            Tobacco Use as of 1/4/2018     Smoking Status Smoking Start Date Smoking Quit Date Packs/day Years Used    Former Smoker -- -- -- --    Types Comments Smokeless Tobacco Status Smokeless Tobacco Quit Date Source    -- -- Never Used -- Provider            Alcohol Use as of 1/4/2018     Alcohol Use Drinks/Week Alcohol/Week Comments Source    No -- -- -- Provider            Drug Use as of 1/4/2018     Drug Use Types Frequency Comments Source    No -- -- -- Provider            Sexual Activity as of 1/4/2018     Sexually Active Birth Control Partners Comments Source    Not Asked -- -- -- Provider            Activities of Daily Living as of 1/4/2018    **None**           Social Documentation as of 1/4/2018    **None**           Occupational as of 1/4/2018    **None**           Socioeconomic as of 1/4/2018     Marital Status Spouse Name Number of Children Years Education Preferred Language Ethnicity Race Source    Single -- -- -- English /White White --         Pertinent History Q A Comments    as of 1/4/2018 Lives with      Place in Birth Order      Lives in      Number of Siblings      Raised by      Legal Involvement      Childhood Trauma      Criminal History of      Financial Status      Highest Level of Education      Does patient have access to a firearm?       Service      Primary Leisure Activity      Spirituality       Past Medical History:   Diagnosis Date    Angiosarcoma     Angiosarcoma     Anxiety     Bone cancer     Depression      Past Surgical History:   Procedure Laterality Date    LEG AMPUTATION AT HIP Left 7/2015     Family History     Problem Relation (Age of  Onset)    Arthritis Maternal Grandmother, Maternal Grandfather    Cancer Paternal Uncle, Maternal Grandmother    Diabetes Paternal Grandmother    Heart attack Maternal Grandmother, Maternal Grandfather, Paternal Grandfather    Hypertension Paternal Grandfather    No Known Problems Father        Social History Main Topics    Smoking status: Former Smoker    Smokeless tobacco: Never Used    Alcohol use No    Drug use: No    Sexual activity: Not on file     Review of patient's allergies indicates:   Allergen Reactions    Phenergan [promethazine] Anxiety     Pt reports that she can take po phenergan without any reaction.        No current facility-administered medications on file prior to encounter.      Current Outpatient Prescriptions on File Prior to Encounter   Medication Sig    diazePAM (VALIUM) 10 MG Tab Take 10 mg by mouth once daily.     lorazepam (ATIVAN) 1 MG tablet Take 1 mg by mouth 3 (three) times daily.     mirtazapine (REMERON) 45 MG tablet Take 1 tablet (45 mg total) by mouth every evening.    promethazine (PHENERGAN) 25 MG tablet Take 25 mg by mouth daily as needed for Nausea.     quetiapine (SEROQUEL) 100 MG Tab Take 1 tablet (100 mg total) by mouth every evening.    SERTRALINE HCL (SERTRALINE ORAL) Take 150 mg by mouth once daily.      Psychotherapeutics     Start     Stop Route Frequency Ordered    01/03/18 2100  sertraline tablet 50 mg      -- Oral Nightly 01/03/18 1031    01/01/18 2100  mirtazapine tablet 30 mg      -- Oral Nightly 01/01/18 1904    12/31/17 2100  QUEtiapine tablet 100 mg      -- Oral Nightly 12/31/17 1538    12/30/17 2207  LORazepam tablet 2 mg      -- Oral Every 6 hours PRN 12/30/17 2107        Review of Systems    Strengths and Liabilities: Strength: Patient is expressive/articulate., Liability: Patient is defensive., Liability: Patient lacks coping skills.    Objective:     Vital Signs (Most Recent):  Temp: 97.5 °F (36.4 °C) (01/04/18 0841)  Pulse: 91 (01/04/18  "0841)  Resp: 18 (01/04/18 0841)  BP: (!) 137/90 (01/04/18 0841)  SpO2: 97 % (01/04/18 0841) Vital Signs (24h Range):  Temp:  [96.2 °F (35.7 °C)-99 °F (37.2 °C)] 97.5 °F (36.4 °C)  Pulse:  [54-97] 91  Resp:  [15-19] 18  SpO2:  [94 %-98 %] 97 %  BP: (128-148)/(68-90) 137/90     Height: 5' 4" (162.6 cm)  Weight: 63.5 kg (139 lb 15.9 oz)  Body mass index is 24.03 kg/m².    No intake or output data in the 24 hours ending 01/04/18 1006    Physical Exam     Appearance: AKA, fair grooming/hygeine wearing hospital gown in NAD; diaphoretic, dilated pupils, tremulous  Behavior: somewhat agitated, defensive, minimizing  Speech: normal rate, tone, and volume  Mood: depressed  Affect: anxious  Thought Process: linear  Thought Perceptions: denied AVH  Thought Content: denied SI, HI; no delusions apparent  Sensorium: awake, alert  Attention/Concentration: intact to conversation  Orientation: person, place, time, and situation  Memory: intact (recent, remote)  Abstraction: intact   Insight: poor  Judgment: good         Significant Labs:   Recent Results (from the past 48 hour(s))   Comprehensive metabolic panel    Collection Time: 01/03/18  6:22 AM   Result Value Ref Range    Sodium 142 136 - 145 mmol/L    Potassium 3.3 (L) 3.5 - 5.1 mmol/L    Chloride 107 95 - 110 mmol/L    CO2 25 23 - 29 mmol/L    Glucose 94 70 - 110 mg/dL    BUN, Bld 12 6 - 20 mg/dL    Creatinine 0.7 0.5 - 1.4 mg/dL    Calcium 9.7 8.7 - 10.5 mg/dL    Total Protein 7.3 6.0 - 8.4 g/dL    Albumin 3.6 3.5 - 5.2 g/dL    Total Bilirubin 0.2 0.1 - 1.0 mg/dL    Alkaline Phosphatase 111 55 - 135 U/L    AST 27 10 - 40 U/L    ALT 45 (H) 10 - 44 U/L    Anion Gap 10 8 - 16 mmol/L    eGFR if African American >60.0 >60 mL/min/1.73 m^2    eGFR if non African American >60.0 >60 mL/min/1.73 m^2   Magnesium    Collection Time: 01/03/18  6:22 AM   Result Value Ref Range    Magnesium 2.0 1.6 - 2.6 mg/dL   Phosphorus    Collection Time: 01/03/18  6:22 AM   Result Value Ref Range    " Phosphorus 3.1 2.7 - 4.5 mg/dL   CBC auto differential    Collection Time: 01/03/18  6:22 AM   Result Value Ref Range    WBC 5.58 3.90 - 12.70 K/uL    RBC 4.01 4.00 - 5.40 M/uL    Hemoglobin 11.9 (L) 12.0 - 16.0 g/dL    Hematocrit 33.9 (L) 37.0 - 48.5 %    MCV 85 82 - 98 fL    MCH 29.7 27.0 - 31.0 pg    MCHC 35.1 32.0 - 36.0 g/dL    RDW 11.9 11.5 - 14.5 %    Platelets 321 150 - 350 K/uL    MPV 9.6 9.2 - 12.9 fL    Immature Granulocytes 1.6 (H) 0.0 - 0.5 %    Gran # 3.9 1.8 - 7.7 K/uL    Immature Grans (Abs) 0.09 (H) 0.00 - 0.04 K/uL    Lymph # 0.9 (L) 1.0 - 4.8 K/uL    Mono # 0.7 0.3 - 1.0 K/uL    Eos # 0.0 0.0 - 0.5 K/uL    Baso # 0.02 0.00 - 0.20 K/uL    nRBC 0 0 /100 WBC    Gran% 69.9 38.0 - 73.0 %    Lymph% 15.2 (L) 18.0 - 48.0 %    Mono% 12.4 4.0 - 15.0 %    Eosinophil% 0.5 0.0 - 8.0 %    Basophil% 0.4 0.0 - 1.9 %    Differential Method Automated    Protime-INR    Collection Time: 01/03/18  6:22 AM   Result Value Ref Range    Prothrombin Time 11.5 9.0 - 12.5 sec    INR 1.1 0.8 - 1.2   Tricyclic Screen, Serum    Collection Time: 01/03/18  6:22 AM   Result Value Ref Range    TCA Scrn 185.1 See Text ng/mL   Comprehensive metabolic panel    Collection Time: 01/04/18  4:02 AM   Result Value Ref Range    Sodium 140 136 - 145 mmol/L    Potassium 3.6 3.5 - 5.1 mmol/L    Chloride 102 95 - 110 mmol/L    CO2 26 23 - 29 mmol/L    Glucose 88 70 - 110 mg/dL    BUN, Bld 13 6 - 20 mg/dL    Creatinine 0.8 0.5 - 1.4 mg/dL    Calcium 9.5 8.7 - 10.5 mg/dL    Total Protein 7.4 6.0 - 8.4 g/dL    Albumin 3.6 3.5 - 5.2 g/dL    Total Bilirubin 0.2 0.1 - 1.0 mg/dL    Alkaline Phosphatase 105 55 - 135 U/L    AST 19 10 - 40 U/L    ALT 34 10 - 44 U/L    Anion Gap 12 8 - 16 mmol/L    eGFR if African American >60.0 >60 mL/min/1.73 m^2    eGFR if non African American >60.0 >60 mL/min/1.73 m^2   Magnesium    Collection Time: 01/04/18  4:02 AM   Result Value Ref Range    Magnesium 2.1 1.6 - 2.6 mg/dL   Phosphorus    Collection Time: 01/04/18   4:02 AM   Result Value Ref Range    Phosphorus 3.5 2.7 - 4.5 mg/dL   CBC auto differential    Collection Time: 01/04/18  4:02 AM   Result Value Ref Range    WBC 5.29 3.90 - 12.70 K/uL    RBC 4.10 4.00 - 5.40 M/uL    Hemoglobin 12.3 12.0 - 16.0 g/dL    Hematocrit 35.0 (L) 37.0 - 48.5 %    MCV 85 82 - 98 fL    MCH 30.0 27.0 - 31.0 pg    MCHC 35.1 32.0 - 36.0 g/dL    RDW 12.1 11.5 - 14.5 %    Platelets 330 150 - 350 K/uL    MPV 9.8 9.2 - 12.9 fL    Immature Granulocytes 1.3 (H) 0.0 - 0.5 %    Gran # 3.4 1.8 - 7.7 K/uL    Immature Grans (Abs) 0.07 (H) 0.00 - 0.04 K/uL    Lymph # 1.0 1.0 - 4.8 K/uL    Mono # 0.8 0.3 - 1.0 K/uL    Eos # 0.0 0.0 - 0.5 K/uL    Baso # 0.02 0.00 - 0.20 K/uL    nRBC 0 0 /100 WBC    Gran% 64.5 38.0 - 73.0 %    Lymph% 18.5 18.0 - 48.0 %    Mono% 15.1 (H) 4.0 - 15.0 %    Eosinophil% 0.2 0.0 - 8.0 %    Basophil% 0.4 0.0 - 1.9 %    Differential Method Automated    Protime-INR    Collection Time: 01/04/18  4:02 AM   Result Value Ref Range    Prothrombin Time 11.8 9.0 - 12.5 sec    INR 1.1 0.8 - 1.2      No results found for: PHENYTOIN, PHENOBARB, VALPROATE, CBMZ      Significant Imaging: I have reviewed all pertinent imaging results/findings within the past 24 hours.    Assessment/Plan:     * Suicide attempt by multiple drug overdose    - Agree with PEC in setting of deliberate suicide attempt, will seek inpatient placement once medically cleared, currently being admitted from ED to ICU. At this time concern for patient developing possible serotonin syndrome, NMS, tylenol toxicity and TCA toxicity. Overdose on Seroquel also likely given prolonged Qtc (since ED arrival 453 -> 497).  - Per her suicide note recent use of heroin and methamphetamines, UDS + opiates and benzos  - Home regimen per ICU team amphetamine salts, clonidine, flexaril, cyproheptadine, valium, fentanyl, lamictal, ativan, remeron, zofran, oxycodone, lyrica, seroquel, and zoloft.   - Per primary team: Review of  shows regular  prescriptions for oxycodone 20 mg dispense 360 tablets q 30 days, valium 10 mg dispense 30 tablets q 30 days, lorazepam 1 mg dispense 90 tablets q 30 days, fentanyl patches as well. This goes as far back as 1 calendar year.  - Continue home psychotropic medications, increase Remeron to 45 mg po qhs for mood and sleep, and to gain therapeutic alliance with the patient.   - May use Ativan 2mg PO or IM q6 hours PRN severe anxiety; would avoid using antipsychotics     Plan:   - Addiction psych met with pt yesterday and do not feel that she is a candidate for subutex at this time.    - Continue PEC/CEC for danger to self and transfer to inpatient psych once medically cleared  - Please give Zoloft QHS            Opioid dependence with intoxication    -Pt not appropriate for transition to suboxone at this time. Suboxone can be helpful for pain but would not likely be sufficient for patient's needs.   -Would consult pain management for complete evaluation of pain symptoms and appropriate              Need for Continued Hospitalization:   Psychiatric illness continues to pose a potential threat to life or bodily function, of self or others, thereby requiring the need for continued inpatient psychiatric hospitalization.    Anticipated Disposition: Psychiatric Hospital     Total time:  25 with greater than 50% of this time spent in counseling and/or coordination of care.       Arnaldo Gerber MD   Psychiatry  [facility]

## 2018-01-04 NOTE — PROGRESS NOTES
Called into room by sitter because pt c/o of nausea and vomiting. Offered PRN promethegan suppository- pt states only thing that works is the magic mouthwash. Paged placed to medical team

## 2018-01-04 NOTE — PLAN OF CARE
Problem: Patient Care Overview  Goal: Plan of Care Review  Pt remained free from injury/falls this shift. VSS- no signs of any distress. Pt c/o of generalized pain, ingestion, and nausea- gave prn medications as ordered. Pt repositioned independently, skin intact. Bed locked in lowest position, SR upx2, call light within reach. Will continue to monitor.

## 2018-01-04 NOTE — ASSESSMENT & PLAN NOTE
- Agree with PEC in setting of deliberate suicide attempt, will seek inpatient placement once medically cleared, currently being admitted from ED to ICU. At this time concern for patient developing possible serotonin syndrome, NMS, tylenol toxicity and TCA toxicity. Overdose on Seroquel also likely given prolonged Qtc (since ED arrival 453 -> 497).  - Per her suicide note recent use of heroin and methamphetamines, UDS + opiates and benzos  - Home regimen per ICU team amphetamine salts, clonidine, flexaril, cyproheptadine, valium, fentanyl, lamictal, ativan, remeron, zofran, oxycodone, lyrica, seroquel, and zoloft.   - Per primary team: Review of  shows regular prescriptions for oxycodone 20 mg dispense 360 tablets q 30 days, valium 10 mg dispense 30 tablets q 30 days, lorazepam 1 mg dispense 90 tablets q 30 days, fentanyl patches as well. This goes as far back as 1 calendar year.  - Continue home psychotropic medications, increase Remeron to 45 mg po qhs for mood and sleep, and to gain therapeutic alliance with the patient.   - May use Ativan 2mg PO or IM q6 hours PRN severe anxiety; would avoid using antipsychotics     Plan:   - Addiction psych met with pt yesterday and do not feel that she is a candidate for subutex at this time.    - Continue PEC/CEC for danger to self and transfer to inpatient psych once medically cleared  - Please give Zoloft QHS

## 2018-01-04 NOTE — SUBJECTIVE & OBJECTIVE
Patient History           Medical as of 1/4/2018     Past Medical History     Diagnosis Date Comments Source    Angiosarcoma -- -- Provider    Angiosarcoma -- -- Provider    Anxiety -- -- Provider    Bone cancer -- -- Provider    Depression -- -- Provider          Pertinent Negatives     Diagnosis Date Noted Comments Source    Asthma 6/29/2015 -- Provider    COPD (chronic obstructive pulmonary disease) 6/29/2015 -- Provider    Diabetes mellitus 1/31/2015 -- Provider    Hypertension 6/29/2015 -- Provider                  Surgical as of 1/4/2018     Past Surgical History     Procedure Laterality Date Comments Source    LEG AMPUTATION AT HIP Left 7/2015 -- Provider                  Family as of 1/4/2018     Problem Relation Name Age of Onset Comments Source    Cancer Paternal Uncle great uncle -- lung Provider    Cancer Maternal Grandmother -- -- breast Provider    Heart attack Maternal Grandmother -- -- -- Provider    Arthritis Maternal Grandmother -- -- -- Provider    Heart attack Maternal Grandfather -- -- -- Provider    Arthritis Maternal Grandfather -- -- -- Provider    Diabetes Paternal Grandmother -- -- -- Provider    Hypertension Paternal Grandfather -- -- -- Provider    Heart attack Paternal Grandfather -- -- -- Provider    No Known Problems Father -- -- -- Provider            Tobacco Use as of 1/4/2018     Smoking Status Smoking Start Date Smoking Quit Date Packs/day Years Used    Former Smoker -- -- -- --    Types Comments Smokeless Tobacco Status Smokeless Tobacco Quit Date Source    -- -- Never Used -- Provider            Alcohol Use as of 1/4/2018     Alcohol Use Drinks/Week Alcohol/Week Comments Source    No -- -- -- Provider            Drug Use as of 1/4/2018     Drug Use Types Frequency Comments Source    No -- -- -- Provider            Sexual Activity as of 1/4/2018     Sexually Active Birth Control Partners Comments Source    Not Asked -- -- -- Provider            Activities of Daily Living as  of 1/4/2018    **None**           Social Documentation as of 1/4/2018    **None**           Occupational as of 1/4/2018    **None**           Socioeconomic as of 1/4/2018     Marital Status Spouse Name Number of Children Years Education Preferred Language Ethnicity Race Source    Single -- -- -- English /White White --         Pertinent History Q A Comments    as of 1/4/2018 Lives with      Place in Birth Order      Lives in      Number of Siblings      Raised by      Legal Involvement      Childhood Trauma      Criminal History of      Financial Status      Highest Level of Education      Does patient have access to a firearm?       Service      Primary Leisure Activity      Spirituality       Past Medical History:   Diagnosis Date    Angiosarcoma     Angiosarcoma     Anxiety     Bone cancer     Depression      Past Surgical History:   Procedure Laterality Date    LEG AMPUTATION AT HIP Left 7/2015     Family History     Problem Relation (Age of Onset)    Arthritis Maternal Grandmother, Maternal Grandfather    Cancer Paternal Uncle, Maternal Grandmother    Diabetes Paternal Grandmother    Heart attack Maternal Grandmother, Maternal Grandfather, Paternal Grandfather    Hypertension Paternal Grandfather    No Known Problems Father        Social History Main Topics    Smoking status: Former Smoker    Smokeless tobacco: Never Used    Alcohol use No    Drug use: No    Sexual activity: Not on file     Review of patient's allergies indicates:   Allergen Reactions    Phenergan [promethazine] Anxiety     Pt reports that she can take po phenergan without any reaction.        No current facility-administered medications on file prior to encounter.      Current Outpatient Prescriptions on File Prior to Encounter   Medication Sig    diazePAM (VALIUM) 10 MG Tab Take 10 mg by mouth once daily.     lorazepam (ATIVAN) 1 MG tablet Take 1 mg by mouth 3 (three) times daily.     mirtazapine (REMERON) 45  "MG tablet Take 1 tablet (45 mg total) by mouth every evening.    promethazine (PHENERGAN) 25 MG tablet Take 25 mg by mouth daily as needed for Nausea.     quetiapine (SEROQUEL) 100 MG Tab Take 1 tablet (100 mg total) by mouth every evening.    SERTRALINE HCL (SERTRALINE ORAL) Take 150 mg by mouth once daily.      Psychotherapeutics     Start     Stop Route Frequency Ordered    01/03/18 2100  sertraline tablet 50 mg      -- Oral Nightly 01/03/18 1031    01/01/18 2100  mirtazapine tablet 30 mg      -- Oral Nightly 01/01/18 1904    12/31/17 2100  QUEtiapine tablet 100 mg      -- Oral Nightly 12/31/17 1538    12/30/17 2207  LORazepam tablet 2 mg      -- Oral Every 6 hours PRN 12/30/17 2107        Review of Systems    Strengths and Liabilities: Strength: Patient is expressive/articulate., Liability: Patient is defensive., Liability: Patient lacks coping skills.    Objective:     Vital Signs (Most Recent):  Temp: 97.5 °F (36.4 °C) (01/04/18 0841)  Pulse: 91 (01/04/18 0841)  Resp: 18 (01/04/18 0841)  BP: (!) 137/90 (01/04/18 0841)  SpO2: 97 % (01/04/18 0841) Vital Signs (24h Range):  Temp:  [96.2 °F (35.7 °C)-99 °F (37.2 °C)] 97.5 °F (36.4 °C)  Pulse:  [54-97] 91  Resp:  [15-19] 18  SpO2:  [94 %-98 %] 97 %  BP: (128-148)/(68-90) 137/90     Height: 5' 4" (162.6 cm)  Weight: 63.5 kg (139 lb 15.9 oz)  Body mass index is 24.03 kg/m².    No intake or output data in the 24 hours ending 01/04/18 1006    Physical Exam     Appearance: AKA, fair grooming/hygeine wearing hospital gown in NAD; diaphoretic, dilated pupils, tremulous  Behavior: somewhat agitated, defensive, minimizing  Speech: normal rate, tone, and volume  Mood: depressed  Affect: anxious  Thought Process: linear  Thought Perceptions: denied AVH  Thought Content: denied SI, HI; no delusions apparent  Sensorium: awake, alert  Attention/Concentration: intact to conversation  Orientation: person, place, time, and situation  Memory: intact (recent, remote)  Abstraction: " intact   Insight: poor  Judgment: good         Significant Labs:   Recent Results (from the past 48 hour(s))   Comprehensive metabolic panel    Collection Time: 01/03/18  6:22 AM   Result Value Ref Range    Sodium 142 136 - 145 mmol/L    Potassium 3.3 (L) 3.5 - 5.1 mmol/L    Chloride 107 95 - 110 mmol/L    CO2 25 23 - 29 mmol/L    Glucose 94 70 - 110 mg/dL    BUN, Bld 12 6 - 20 mg/dL    Creatinine 0.7 0.5 - 1.4 mg/dL    Calcium 9.7 8.7 - 10.5 mg/dL    Total Protein 7.3 6.0 - 8.4 g/dL    Albumin 3.6 3.5 - 5.2 g/dL    Total Bilirubin 0.2 0.1 - 1.0 mg/dL    Alkaline Phosphatase 111 55 - 135 U/L    AST 27 10 - 40 U/L    ALT 45 (H) 10 - 44 U/L    Anion Gap 10 8 - 16 mmol/L    eGFR if African American >60.0 >60 mL/min/1.73 m^2    eGFR if non African American >60.0 >60 mL/min/1.73 m^2   Magnesium    Collection Time: 01/03/18  6:22 AM   Result Value Ref Range    Magnesium 2.0 1.6 - 2.6 mg/dL   Phosphorus    Collection Time: 01/03/18  6:22 AM   Result Value Ref Range    Phosphorus 3.1 2.7 - 4.5 mg/dL   CBC auto differential    Collection Time: 01/03/18  6:22 AM   Result Value Ref Range    WBC 5.58 3.90 - 12.70 K/uL    RBC 4.01 4.00 - 5.40 M/uL    Hemoglobin 11.9 (L) 12.0 - 16.0 g/dL    Hematocrit 33.9 (L) 37.0 - 48.5 %    MCV 85 82 - 98 fL    MCH 29.7 27.0 - 31.0 pg    MCHC 35.1 32.0 - 36.0 g/dL    RDW 11.9 11.5 - 14.5 %    Platelets 321 150 - 350 K/uL    MPV 9.6 9.2 - 12.9 fL    Immature Granulocytes 1.6 (H) 0.0 - 0.5 %    Gran # 3.9 1.8 - 7.7 K/uL    Immature Grans (Abs) 0.09 (H) 0.00 - 0.04 K/uL    Lymph # 0.9 (L) 1.0 - 4.8 K/uL    Mono # 0.7 0.3 - 1.0 K/uL    Eos # 0.0 0.0 - 0.5 K/uL    Baso # 0.02 0.00 - 0.20 K/uL    nRBC 0 0 /100 WBC    Gran% 69.9 38.0 - 73.0 %    Lymph% 15.2 (L) 18.0 - 48.0 %    Mono% 12.4 4.0 - 15.0 %    Eosinophil% 0.5 0.0 - 8.0 %    Basophil% 0.4 0.0 - 1.9 %    Differential Method Automated    Protime-INR    Collection Time: 01/03/18  6:22 AM   Result Value Ref Range    Prothrombin Time 11.5 9.0 -  12.5 sec    INR 1.1 0.8 - 1.2   Tricyclic Screen, Serum    Collection Time: 01/03/18  6:22 AM   Result Value Ref Range    TCA Scrn 185.1 See Text ng/mL   Comprehensive metabolic panel    Collection Time: 01/04/18  4:02 AM   Result Value Ref Range    Sodium 140 136 - 145 mmol/L    Potassium 3.6 3.5 - 5.1 mmol/L    Chloride 102 95 - 110 mmol/L    CO2 26 23 - 29 mmol/L    Glucose 88 70 - 110 mg/dL    BUN, Bld 13 6 - 20 mg/dL    Creatinine 0.8 0.5 - 1.4 mg/dL    Calcium 9.5 8.7 - 10.5 mg/dL    Total Protein 7.4 6.0 - 8.4 g/dL    Albumin 3.6 3.5 - 5.2 g/dL    Total Bilirubin 0.2 0.1 - 1.0 mg/dL    Alkaline Phosphatase 105 55 - 135 U/L    AST 19 10 - 40 U/L    ALT 34 10 - 44 U/L    Anion Gap 12 8 - 16 mmol/L    eGFR if African American >60.0 >60 mL/min/1.73 m^2    eGFR if non African American >60.0 >60 mL/min/1.73 m^2   Magnesium    Collection Time: 01/04/18  4:02 AM   Result Value Ref Range    Magnesium 2.1 1.6 - 2.6 mg/dL   Phosphorus    Collection Time: 01/04/18  4:02 AM   Result Value Ref Range    Phosphorus 3.5 2.7 - 4.5 mg/dL   CBC auto differential    Collection Time: 01/04/18  4:02 AM   Result Value Ref Range    WBC 5.29 3.90 - 12.70 K/uL    RBC 4.10 4.00 - 5.40 M/uL    Hemoglobin 12.3 12.0 - 16.0 g/dL    Hematocrit 35.0 (L) 37.0 - 48.5 %    MCV 85 82 - 98 fL    MCH 30.0 27.0 - 31.0 pg    MCHC 35.1 32.0 - 36.0 g/dL    RDW 12.1 11.5 - 14.5 %    Platelets 330 150 - 350 K/uL    MPV 9.8 9.2 - 12.9 fL    Immature Granulocytes 1.3 (H) 0.0 - 0.5 %    Gran # 3.4 1.8 - 7.7 K/uL    Immature Grans (Abs) 0.07 (H) 0.00 - 0.04 K/uL    Lymph # 1.0 1.0 - 4.8 K/uL    Mono # 0.8 0.3 - 1.0 K/uL    Eos # 0.0 0.0 - 0.5 K/uL    Baso # 0.02 0.00 - 0.20 K/uL    nRBC 0 0 /100 WBC    Gran% 64.5 38.0 - 73.0 %    Lymph% 18.5 18.0 - 48.0 %    Mono% 15.1 (H) 4.0 - 15.0 %    Eosinophil% 0.2 0.0 - 8.0 %    Basophil% 0.4 0.0 - 1.9 %    Differential Method Automated    Protime-INR    Collection Time: 01/04/18  4:02 AM   Result Value Ref Range     Prothrombin Time 11.8 9.0 - 12.5 sec    INR 1.1 0.8 - 1.2      No results found for: PHENYTOIN, PHENOBARB, VALPROATE, CBMZ      Significant Imaging: I have reviewed all pertinent imaging results/findings within the past 24 hours.

## 2018-01-04 NOTE — PLAN OF CARE
Call received from [facility] at [facility] stating they can accept pt for admission today. Accepting physician is Dr. Kristofer Vasques, attending nurse will call report to [phone] ask for building 6.     SW called  [facility] at ext. [phone], option 2 spoke to dispatcher who arranged pt's transport by stretcher within the hour. Original PEC/ CEC placed in packet

## 2018-01-04 NOTE — PROGRESS NOTES
Pt escorted off unit via stretcher escorted per [facility] and 2 [facility] Security Officers. [facility]  Given transport packet with active PEC/CEC order. Pt's mother at bedside and informed of transfer to [facility]. Pt's v/s stable,no acute distress noted and skin intact.

## 2018-01-05 PROBLEM — F11.20 OPIOID DEPENDENCE IN CONTROLLED ENVIRONMENT: Status: ACTIVE | Noted: 2018-01-05

## 2018-01-05 NOTE — PLAN OF CARE
01/04/18 1600   Final Note   Assessment Type Final Discharge Note   Discharge Disposition Psych     Follow-up With  Details  Why  Contact Info   [facility]    Psych  [address]  [place] [address]  [phone]

## 2018-01-06 NOTE — DISCHARGE SUMMARY
"[facility] Hospital Medicine  Discharge Summary      Patient Name: Val Gil  MRN: [id number]  Admission Date: 12/28/2017  Hospital Length of Stay: 7 days  Discharge Date and Time: 1/4/18  Attending Physician: No att. providers found   Discharging Provider: Bahman Yee MD  Primary Care Provider: [facility] Medicine Team: [facility] HOSP MED X Bahman Yee MD    HPI:   36 y/o woman who presents after a suicide attempt and subsequent drug overdose. Pt has an opioid addiction and thought she could cure herself by using methamphetamine.  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 She has taken a lot of pills however we are unsure as to all of what she has taken. EMS did not collect any bottles or pills. Per her mother, she does take Ambien. At the scene a bag of notes were found that said "I have put some toxic substance on blanks and sheets. Do not resuscitate me. This is my time." In these notes, speed and opiates were mentioned. Pt also stuffed notes into her clothing that said "do not resuscitate me." Her father did notice some vomiting earlier today. When her mother arrived at the scene, pt had slurred speech and was subjectively unresponsive. Pt is now being decontaminated.  Critical care was consulted after patient could not be aroused after initial evaluation in the ED. She was given one dose of Narcan, after which she began to awake. She still had slurred speech and would not engage in conversation or follow commands. Will call family to obtain more collateral.        * No surgery found *      Hospital Course:   Patient was admitted to the ICU for suicide attempt with drug overdose. After monitoring overnight patient was transferred to the floor. Bicarbonate drip started in the ICU but later discontinued. Antipsychotics held by Psychiatry consult.  Zoloft, Remeron, Lamictal and Seroquel,subsequently restarted. Psychiatry had long " conversation with patient and family at bedside, who is still tearful and frustrated as to the expected length of stay and the impending transfer to an inpatient Psychiatric facility. Patient is actively complaining of pain and discomfort. Started on home meds. Patient expressed interest in transferring to [facility] or possibly [facility] for treatment of both her depression and addiction.     * Suicide attempt by multiple drug overdose     -Patient ingested unknown amount as well as identity of multiple substances  -UDS positive for benzos and opiates  -Will contact family for more information  -Patient currently PEC'd  -Psychiatry consulted  -Patient's home meds include amphetamine salt, clonidine, flexaril, cyproheptadine, valium, fentanyl, lamictal, ativan, remeron, zofran, oxycodone, lyrica, seroquil, and zoloft  -Salicylate level <5.0  -Acetaminophen level normal  - Addiction Psychiatry consulted   - Awaiting placement to Inpatient Psychiatry unit, will need PT/Ot prior   -Patient started on NAC, will continue to trend acetominophen levels q 12 hours  -Liver enzymes not elevated  - daily TCA levels normal   -Spoke to patient's father who is unfamiliar with patient's medications, however he gave us mother's number. When we tried to contact mother, number was disconnected. Also attempted to contact patient's cell phone, but this number was also disconnected   -EKG showed sinus tachycardia,   -Review of  shows regular prescriptions for oxycodone 20 mg dispense 360 tablets q 30 days, valium 10 mg dispense 30 tablets q 30 days, lorazepam 1 mg dispense 90 tablets q 30 days, fentanyl patches as well. This goes as far back as 1 calendar year.  - Restart  Zoloft, Remeron, Lamictal and Seroquel on 12/31/17  - Remeron increased to 30mg QHS on 01/02/18       Encephalopathy, toxic     Resolved           Tricyclic overdose, intentional self-harm, initial encounter       >500.0   312.5   TCA Scrn   On Bicarbonate drip  while in the ICU, discontinued due to metabolic alkalosis              Acetaminophen overdose, intentional self-harm, initial encounter     Tylenol levels normal, 21 on admission          Benzodiazepine dependence     Ativan PRN          Leucocytosis     Resolved           Anxiety     -Patient known to take benzodiazepenes for anxiety  -Will ensure seizure precautions as well as frequent neuro checks  -Will order ativan prn for withdrawals        Opioid dependence with intoxication     -Patient presented in obtunded state with pinpoint pupils, GCS <8  -Upon administration of Narcan, patient began to wake up, GCS of 9, currently with nasal trumpet tolerating well  - avoid opioids for now  - treat withdrawal symptomatically with clonidine prn       Impaired mobility and ADLs     - seen by PT/OT, they have cleared patient to go to rehab                   Consults:   Consults         Status Ordering Provider     Inpatient consult to Critical Care Medicine  Once     Provider:  (Not yet assigned)    Completed YVONNE DAMON II     Inpatient consult to Psychiatry  Once     Provider:  (Not yet assigned)    Completed YVONNE DAMON II     Inpatient consult to Psychiatry  Once     Provider:  (Not yet assigned)    Completed FRANK RAMIREZ          No new Assessment & Plan notes have been filed under this hospital service since the last note was generated.  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.20] 12/31/2017 Yes    Depression [F32.9] 12/31/2017 Yes    Opioid dependence with intoxication [F11.229] 12/29/2017 Yes    Anxiety [F41.9] 12/29/2017 Yes    Leucocytosis [D72.829] 12/29/2017 Yes    Benzodiazepine dependence [F13.20] 12/29/2017 Yes    Acetaminophen  overdose, intentional self-harm, initial encounter [T39.1X2A] 12/29/2017 Yes    Tricyclic overdose, intentional self-harm, initial encounter [T43.012A] 12/29/2017 Yes    Encephalopathy, toxic [G92] 12/29/2017 Yes    Impaired mobility and ADLs [Z74.09] 08/10/2015 Yes      Problems Resolved During this Admission:    Diagnosis Date Noted Date Resolved POA       Discharged Condition: fair    Disposition: Psychiatric Hospital    Follow Up:  Follow-up Information     [facility].    Specialties:  Physical Therapy, Psychiatry, Behavioral Health, Geriatric Medicine  Why:  Psych  Contact information:  [address]  [place] [address]  [phone]                 Patient Instructions:   No discharge procedures on file.        Pending Diagnostic Studies:     None         Medications:  Reconciled Home Medications:   Discharge Medication List as of 1/4/2018  4:47 PM      CONTINUE these medications which have CHANGED    Details   LORazepam (ATIVAN) 1 MG tablet Take 1 tablet (1 mg total) by mouth 3 (three) times daily as needed for Anxiety., Starting Thu 1/4/2018, No Print         CONTINUE these medications which have NOT CHANGED    Details   LAMOTRIGINE (LAMICTAL ORAL) Take 75 mg by mouth daily, Historical Med      mirtazapine (REMERON) 45 MG tablet Take 1 tablet (45 mg total) by mouth every evening., Starting Fri 7/24/2015, Until Wed 1/3/2018, Normal      promethazine (PHENERGAN) 25 MG tablet Take 25 mg by mouth daily as needed for Nausea. , Starting Thu 12/31/2015, Historical Med      SERTRALINE HCL (SERTRALINE ORAL) Take 150 mg by mouth once daily. , Starting Wed 12/23/2015, Historical Med         STOP taking these medications       diazePAM (VALIUM) 10 MG Tab Comments:   Reason for Stopping:         fentaNYL (DURAGESIC) 100 mcg/hr Comments:   Reason for Stopping:         oxyCODONE (ROXICODONE) 20 mg Tab immediate release tablet Comments:   Reason for Stopping:         quetiapine (SEROQUEL) 100 MG Tab  Comments:   Reason for Stopping:               Indwelling Lines/Drains at time of discharge:   Lines/Drains/Airways          No matching active lines, drains, or airways          Time spent on the discharge of patient: 33 minutes  Patient was seen and examined on the date of discharge and determined to be suitable for discharge.         Bahman Yee MD  Department of Hospital Medicine  [facility]

## 2018-01-06 NOTE — SUBJECTIVE & OBJECTIVE
Interval History: Patient seen and examined at bedside. Patient would like to be started on subutex, told patient that is upto psych; waiting placement    Review of Systems   Constitutional: Negative for chills and fatigue.   HENT: Negative for dental problem and drooling.    Eyes: Negative for pain and discharge.   Respiratory: Negative for apnea, cough and chest tightness.    Cardiovascular: Negative for chest pain.   Gastrointestinal: Negative for abdominal distention and abdominal pain.   Genitourinary: Negative for enuresis and flank pain.   Musculoskeletal: Negative for gait problem and joint swelling.   Neurological: Negative for seizures and syncope.   Psychiatric/Behavioral: Positive for suicidal ideas. Negative for agitation and behavioral problems. The patient is nervous/anxious.      Objective:     Vital Signs (Most Recent):  Temp: 98.6 °F (37 °C) (01/04/18 1159)  Pulse: 80 (01/04/18 1159)  Resp: 18 (01/04/18 1159)  BP: (!) 138/91 (01/04/18 1159)  SpO2: 98 % (01/04/18 1159) Vital Signs (24h Range):        Weight: 63.5 kg (139 lb 15.9 oz)  Body mass index is 24.03 kg/m².  No intake or output data in the 24 hours ending 01/06/18 1647   Physical Exam   Constitutional: She appears well-developed and well-nourished.   HENT:   Head: Normocephalic.   Mouth/Throat: No oropharyngeal exudate.   Eyes: No scleral icterus.   Neck: Normal range of motion. No thyromegaly present.   Cardiovascular: Normal rate, regular rhythm, normal heart sounds and intact distal pulses.  Exam reveals no gallop and no friction rub.    No murmur heard.  Pulmonary/Chest: Effort normal and breath sounds normal. No respiratory distress. She has no wheezes. She exhibits no tenderness.   Abdominal: Soft. She exhibits no distension. There is no tenderness.   Musculoskeletal:   Patient with right AKA   Skin: She is not diaphoretic.       Significant Labs:   CBC:   No results for input(s): WBC, HGB, HCT, PLT in the last 48 hours.  CMP:   No  results for input(s): NA, K, CL, CO2, GLU, BUN, CREATININE, CALCIUM, PROT, ALBUMIN, BILITOT, ALKPHOS, AST, ALT, ANIONGAP, EGFRNONAA in the last 48 hours.    Invalid input(s): ESTGFAFRICA  Coagulation:   No results for input(s): PT, INR, APTT in the last 48 hours.  Lactic Acid: No results for input(s): LACTATE in the last 48 hours.  Lipase: No results for input(s): LIPASE in the last 48 hours.  Magnesium:   No results for input(s): MG in the last 48 hours.    Significant Imaging: I have reviewed and interpreted all pertinent imaging results/findings within the past 24 hours.

## 2018-01-06 NOTE — PROGRESS NOTES
"[facility] Hospital Medicine  Progress Note    Patient Name: Val Gil  MRN: [id number]  Patient Class: IP- Inpatient   Admission Date: 12/28/2017  Length of Stay: 7 days  Attending Physician: No att. providers found  Primary Care Provider: [facility] Medicine Team: [facility] X Bahman Yee MD    Subjective:     Principal Problem:Suicide attempt by multiple drug overdose    HPI:  38 y/o woman who presents after a suicide attempt and subsequent drug overdose. Pt has an opioid addiction and thought she could cure herself by using methamphetamine.  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 She has taken a lot of pills however we are unsure as to all of what she has taken. EMS did not collect any bottles or pills. Per her mother, she does take Ambien. At the scene a bag of notes were found that said "I have put some toxic substance on blanks and sheets. Do not resuscitate me. This is my time." In these notes, speed and opiates were mentioned. Pt also stuffed notes into her clothing that said "do not resuscitate me." Her father did notice some vomiting earlier today. When her mother arrived at the scene, pt had slurred speech and was subjectively unresponsive. Pt is now being decontaminated.  Critical care was consulted after patient could not be aroused after initial evaluation in the ED. She was given one dose of Narcan, after which she began to awake. She still had slurred speech and would not engage in conversation or follow commands. Will call family to obtain more collateral.        Hospital Course:  Patient was admitted to the ICU for suicide attempt with drug overdose. After monitoring overnight patient was transferred to the floor. Bicarbonate drip started in the ICU but later discontinued. Antipsychotics held by Psychiatry consult.  Zoloft, Remeron, Lamictal and Seroquel,subsequently restarted. Psychiatry had long " conversation with patient and family at bedside, who is still tearful and frustrated as to the expected length of stay and the impending transfer to an inpatient Psychiatric facility. Patient is actively complaining of pain and discomfort. Started on home meds. Patient expressed interest in transferring to [facility] or possibly [place] for treatment of both her depression and addiction.     Interval History: Patient seen and examined at bedside. Patient would like to be started on subutex, psych recs suboxone, however patient does not want that; has been accepted to [facility]    Review of Systems   Constitutional: Negative for chills and fatigue.   HENT: Negative for dental problem and drooling.    Eyes: Negative for pain and discharge.   Respiratory: Negative for apnea, cough and chest tightness.    Cardiovascular: Negative for chest pain.   Gastrointestinal: Negative for abdominal distention and abdominal pain.   Genitourinary: Negative for enuresis and flank pain.   Musculoskeletal: Negative for gait problem and joint swelling.   Neurological: Negative for seizures and syncope.   Psychiatric/Behavioral: Positive for suicidal ideas. Negative for agitation and behavioral problems. The patient is nervous/anxious.      Objective:     Vital Signs (Most Recent):  Temp: 98.6 °F (37 °C) (01/04/18 1159)  Pulse: 80 (01/04/18 1159)  Resp: 18 (01/04/18 1159)  BP: (!) 138/91 (01/04/18 1159)  SpO2: 98 % (01/04/18 1159) Vital Signs (24h Range):        Weight: 63.5 kg (139 lb 15.9 oz)  Body mass index is 24.03 kg/m².  No intake or output data in the 24 hours ending 01/06/18 0088   Physical Exam   Constitutional: She appears well-developed and well-nourished.   HENT:   Head: Normocephalic.   Mouth/Throat: No oropharyngeal exudate.   Eyes: No scleral icterus.   Neck: Normal range of motion. No thyromegaly present.   Cardiovascular: Normal rate, regular rhythm, normal heart sounds and intact distal pulses.  Exam reveals no gallop and  no friction rub.    No murmur heard.  Pulmonary/Chest: Effort normal and breath sounds normal. No respiratory distress. She has no wheezes. She exhibits no tenderness.   Abdominal: Soft. She exhibits no distension. There is no tenderness.   Musculoskeletal:   Patient with right AKA   Skin: She is not diaphoretic.       Significant Labs:   CBC:   No results for input(s): WBC, HGB, HCT, PLT in the last 48 hours.  CMP:   No results for input(s): NA, K, CL, CO2, GLU, BUN, CREATININE, CALCIUM, PROT, ALBUMIN, BILITOT, ALKPHOS, AST, ALT, ANIONGAP, EGFRNONAA in the last 48 hours.    Invalid input(s): ESTGFAFRICA  Coagulation:   No results for input(s): PT, INR, APTT in the last 48 hours.  Lactic Acid: No results for input(s): LACTATE in the last 48 hours.  Lipase: No results for input(s): LIPASE in the last 48 hours.  Magnesium:   No results for input(s): MG in the last 48 hours.    Significant Imaging: I have reviewed and interpreted all pertinent imaging results/findings within the past 24 hours.    Assessment/Plan:      * Suicide attempt by multiple drug overdose    -Patient ingested unknown amount as well as identity of multiple substances  -UDS positive for benzos and opiates  -Will contact family for more information  -Patient currently PEC'd  -Psychiatry consulted  -Patient's home meds include amphetamine salt, clonidine, flexaril, cyproheptadine, valium, fentanyl, lamictal, ativan, remeron, zofran, oxycodone, lyrica, seroquil, and zoloft  -Salicylate level <5.0  -Acetaminophen level normal  - Addiction Psychiatry consulted   - Awaiting placement to Inpatient Psychiatry unit, will need PT/Ot prior   -Patient started on NAC, will continue to trend acetominophen levels q 12 hours  -Liver enzymes not elevated  - daily TCA levels normal   -Spoke to patient's father who is unfamiliar with patient's medications, however he gave us mother's number. When we tried to contact mother, number was disconnected. Also attempted to  contact patient's cell phone, but this number was also disconnected   -EKG showed sinus tachycardia,   -Review of  shows regular prescriptions for oxycodone 20 mg dispense 360 tablets q 30 days, valium 10 mg dispense 30 tablets q 30 days, lorazepam 1 mg dispense 90 tablets q 30 days, fentanyl patches as well. This goes as far back as 1 calendar year.  - Restart  Zoloft, Remeron, Lamictal and Seroquel on 12/31/17  - Remeron increased to 30mg QHS on 01/02/18        Encephalopathy, toxic    Resolved           Tricyclic overdose, intentional self-harm, initial encounter     >500.0  312.5  TCA Scrn   On Bicarbonate drip while in the ICU, discontinued due to metabolic alkalosis             Acetaminophen overdose, intentional self-harm, initial encounter    Tylenol levels normal, 21 on admission          Benzodiazepine dependence    Ativan PRN          Leucocytosis    Resolved           Anxiety    -Patient known to take benzodiazepenes for anxiety  -Will ensure seizure precautions as well as frequent neuro checks  -Will order ativan prn for withdrawals         Opioid dependence with intoxication    -Patient presented in obtunded state with pinpoint pupils, GCS <8  -Upon administration of Narcan, patient began to wake up, GCS of 9, currently with nasal trumpet tolerating well  - avoid opioids for now  - treat withdrawal symptomatically with clonidine prn        Impaired mobility and ADLs    - seen by PT/OT, they have cleared patient to go to rehab           VTE Risk Mitigation         Ordered     Medium Risk of VTE  Once      12/29/17 0102              Bahman Yee MD  Department of Hospital Medicine   [facility]

## 2018-01-06 NOTE — SUBJECTIVE & OBJECTIVE
Interval History: Patient seen and examined at bedside. Patient would like to be started on subutex, psych recs suboxone, however patient does not want that; has been accepted to [facility]    Review of Systems   Constitutional: Negative for chills and fatigue.   HENT: Negative for dental problem and drooling.    Eyes: Negative for pain and discharge.   Respiratory: Negative for apnea, cough and chest tightness.    Cardiovascular: Negative for chest pain.   Gastrointestinal: Negative for abdominal distention and abdominal pain.   Genitourinary: Negative for enuresis and flank pain.   Musculoskeletal: Negative for gait problem and joint swelling.   Neurological: Negative for seizures and syncope.   Psychiatric/Behavioral: Positive for suicidal ideas. Negative for agitation and behavioral problems. The patient is nervous/anxious.      Objective:     Vital Signs (Most Recent):  Temp: 98.6 °F (37 °C) (01/04/18 1159)  Pulse: 80 (01/04/18 1159)  Resp: 18 (01/04/18 1159)  BP: (!) 138/91 (01/04/18 1159)  SpO2: 98 % (01/04/18 1159) Vital Signs (24h Range):        Weight: 63.5 kg (139 lb 15.9 oz)  Body mass index is 24.03 kg/m².  No intake or output data in the 24 hours ending 01/06/18 1648   Physical Exam   Constitutional: She appears well-developed and well-nourished.   HENT:   Head: Normocephalic.   Mouth/Throat: No oropharyngeal exudate.   Eyes: No scleral icterus.   Neck: Normal range of motion. No thyromegaly present.   Cardiovascular: Normal rate, regular rhythm, normal heart sounds and intact distal pulses.  Exam reveals no gallop and no friction rub.    No murmur heard.  Pulmonary/Chest: Effort normal and breath sounds normal. No respiratory distress. She has no wheezes. She exhibits no tenderness.   Abdominal: Soft. She exhibits no distension. There is no tenderness.   Musculoskeletal:   Patient with right AKA   Skin: She is not diaphoretic.       Significant Labs:   CBC:   No results for input(s): WBC, HGB, HCT,  PLT in the last 48 hours.  CMP:   No results for input(s): NA, K, CL, CO2, GLU, BUN, CREATININE, CALCIUM, PROT, ALBUMIN, BILITOT, ALKPHOS, AST, ALT, ANIONGAP, EGFRNONAA in the last 48 hours.    Invalid input(s): ESTGFAFRICA  Coagulation:   No results for input(s): PT, INR, APTT in the last 48 hours.  Lactic Acid: No results for input(s): LACTATE in the last 48 hours.  Lipase: No results for input(s): LIPASE in the last 48 hours.  Magnesium:   No results for input(s): MG in the last 48 hours.    Significant Imaging: I have reviewed and interpreted all pertinent imaging results/findings within the past 24 hours.

## 2018-01-06 NOTE — PROGRESS NOTES
"[facility] Hospital Medicine  Progress Note    Patient Name: Val Gil  MRN: [id number]  Patient Class: IP- Inpatient   Admission Date: 12/28/2017  Length of Stay: 7 days  Attending Physician: No att. providers found  Primary Care Provider: [facility] Medicine Team: [facility] X Bahman Yee MD    Subjective:     Principal Problem:Suicide attempt by multiple drug overdose    HPI:  38 y/o woman who presents after a suicide attempt and subsequent drug overdose. Pt has an opioid addiction and thought she could cure herself by using methamphetamine.  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 She has taken a lot of pills however we are unsure as to all of what she has taken. EMS did not collect any bottles or pills. Per her mother, she does take Ambien. At the scene a bag of notes were found that said "I have put some toxic substance on blanks and sheets. Do not resuscitate me. This is my time." In these notes, speed and opiates were mentioned. Pt also stuffed notes into her clothing that said "do not resuscitate me." Her father did notice some vomiting earlier today. When her mother arrived at the scene, pt had slurred speech and was subjectively unresponsive. Pt is now being decontaminated.  Critical care was consulted after patient could not be aroused after initial evaluation in the ED. She was given one dose of Narcan, after which she began to awake. She still had slurred speech and would not engage in conversation or follow commands. Will call family to obtain more collateral.        Hospital Course:  Patient was admitted to the ICU for suicide attempt with drug overdose. After monitoring overnight patient was transferred to the floor. Bicarbonate drip started in the ICU but later discontinued. Antipsychotics held by Psychiatry consult.  Zoloft, Remeron, Lamictal and Seroquel,subsequently restarted. Psychiatry had long " conversation with patient and family at bedside, who is still tearful and frustrated as to the expected length of stay and the impending transfer to an inpatient Psychiatric facility. Patient is actively complaining of pain and discomfort. Started on home meds. Patient expressed interest in transferring to [facility] or possibly [place] for treatment of both her depression and addiction.     Interval History: Patient seen and examined at bedside. Patient would like to be started on subutex, told patient that is upto psych; waiting placement    Review of Systems   Constitutional: Negative for chills and fatigue.   HENT: Negative for dental problem and drooling.    Eyes: Negative for pain and discharge.   Respiratory: Negative for apnea, cough and chest tightness.    Cardiovascular: Negative for chest pain.   Gastrointestinal: Negative for abdominal distention and abdominal pain.   Genitourinary: Negative for enuresis and flank pain.   Musculoskeletal: Negative for gait problem and joint swelling.   Neurological: Negative for seizures and syncope.   Psychiatric/Behavioral: Positive for suicidal ideas. Negative for agitation and behavioral problems. The patient is nervous/anxious.      Objective:     Vital Signs (Most Recent):  Temp: 98.6 °F (37 °C) (01/04/18 1159)  Pulse: 80 (01/04/18 1159)  Resp: 18 (01/04/18 1159)  BP: (!) 138/91 (01/04/18 1159)  SpO2: 98 % (01/04/18 1159) Vital Signs (24h Range):        Weight: 63.5 kg (139 lb 15.9 oz)  Body mass index is 24.03 kg/m².  No intake or output data in the 24 hours ending 01/06/18 7177   Physical Exam   Constitutional: She appears well-developed and well-nourished.   HENT:   Head: Normocephalic.   Mouth/Throat: No oropharyngeal exudate.   Eyes: No scleral icterus.   Neck: Normal range of motion. No thyromegaly present.   Cardiovascular: Normal rate, regular rhythm, normal heart sounds and intact distal pulses.  Exam reveals no gallop and no friction rub.    No murmur  heard.  Pulmonary/Chest: Effort normal and breath sounds normal. No respiratory distress. She has no wheezes. She exhibits no tenderness.   Abdominal: Soft. She exhibits no distension. There is no tenderness.   Musculoskeletal:   Patient with right AKA   Skin: She is not diaphoretic.       Significant Labs:   CBC:   No results for input(s): WBC, HGB, HCT, PLT in the last 48 hours.  CMP:   No results for input(s): NA, K, CL, CO2, GLU, BUN, CREATININE, CALCIUM, PROT, ALBUMIN, BILITOT, ALKPHOS, AST, ALT, ANIONGAP, EGFRNONAA in the last 48 hours.    Invalid input(s): ESTGFAFRICA  Coagulation:   No results for input(s): PT, INR, APTT in the last 48 hours.  Lactic Acid: No results for input(s): LACTATE in the last 48 hours.  Lipase: No results for input(s): LIPASE in the last 48 hours.  Magnesium:   No results for input(s): MG in the last 48 hours.    Significant Imaging: I have reviewed and interpreted all pertinent imaging results/findings within the past 24 hours.    Assessment/Plan:      * Suicide attempt by multiple drug overdose    -Patient ingested unknown amount as well as identity of multiple substances  -UDS positive for benzos and opiates  -Will contact family for more information  -Patient currently PEC'd  -Psychiatry consulted  -Patient's home meds include amphetamine salt, clonidine, flexaril, cyproheptadine, valium, fentanyl, lamictal, ativan, remeron, zofran, oxycodone, lyrica, seroquil, and zoloft  -Salicylate level <5.0  -Acetaminophen level normal  - Addiction Psychiatry consulted   - Awaiting placement to Inpatient Psychiatry unit, will need PT/Ot prior   -Patient started on NAC, will continue to trend acetominophen levels q 12 hours  -Liver enzymes not elevated  - daily TCA levels normal   -Spoke to patient's father who is unfamiliar with patient's medications, however he gave us mother's number. When we tried to contact mother, number was disconnected. Also attempted to contact patient's cell phone,  but this number was also disconnected   -EKG showed sinus tachycardia,   -Review of  shows regular prescriptions for oxycodone 20 mg dispense 360 tablets q 30 days, valium 10 mg dispense 30 tablets q 30 days, lorazepam 1 mg dispense 90 tablets q 30 days, fentanyl patches as well. This goes as far back as 1 calendar year.  - Restart  Zoloft, Remeron, Lamictal and Seroquel on 12/31/17  - Remeron increased to 30mg QHS on 01/02/18        Encephalopathy, toxic    Resolved           Tricyclic overdose, intentional self-harm, initial encounter     >500.0  312.5  TCA Scrn   On Bicarbonate drip while in the ICU, discontinued due to metabolic alkalosis             Acetaminophen overdose, intentional self-harm, initial encounter    Tylenol levels normal, 21 on admission          Benzodiazepine dependence    Ativan PRN          Leucocytosis    Resolved           Anxiety    -Patient known to take benzodiazepenes for anxiety  -Will ensure seizure precautions as well as frequent neuro checks  -Will order ativan prn for withdrawals         Opioid dependence with intoxication    -Patient presented in obtunded state with pinpoint pupils, GCS <8  -Upon administration of Narcan, patient began to wake up, GCS of 9, currently with nasal trumpet tolerating well  - avoid opioids for now  - treat withdrawal symptomatically with clonidine prn        Impaired mobility and ADLs    - seen by PT/OT, they have cleared patient to go to rehab           VTE Risk Mitigation         Ordered     Medium Risk of VTE  Once      12/29/17 0102              Bahman Yee MD  Department of Hospital Medicine   [facility]

## 2018-02-02 ENCOUNTER — OFFICE VISIT (OUTPATIENT)
Dept: URGENT CARE | Facility: CLINIC | Age: 38
End: 2018-02-02
Payer: MEDICAID

## 2018-02-02 VITALS
BODY MASS INDEX: 21.66 KG/M2 | SYSTOLIC BLOOD PRESSURE: 121 MMHG | HEART RATE: 86 BPM | TEMPERATURE: 98 F | DIASTOLIC BLOOD PRESSURE: 88 MMHG | OXYGEN SATURATION: 99 % | HEIGHT: 65 IN | WEIGHT: 130 LBS | RESPIRATION RATE: 18 BRPM

## 2018-02-02 DIAGNOSIS — R30.0 DYSURIA: ICD-10-CM

## 2018-02-02 DIAGNOSIS — N39.0 URINARY TRACT INFECTION WITHOUT HEMATURIA, SITE UNSPECIFIED: Primary | ICD-10-CM

## 2018-02-02 LAB
BILIRUB UR QL STRIP: POSITIVE
GLUCOSE UR QL STRIP: POSITIVE
KETONES UR QL STRIP: NEGATIVE
LEUKOCYTE ESTERASE UR QL STRIP: NEGATIVE
PH, POC UA: 5
POC BLOOD, URINE: NEGATIVE
POC NITRATES, URINE: POSITIVE
PROT UR QL STRIP: POSITIVE
SP GR UR STRIP: 1.01 (ref 1–1.03)
UROBILINOGEN UR STRIP-ACNC: POSITIVE (ref 0.1–1.1)

## 2018-02-02 PROCEDURE — 81003 URINALYSIS AUTO W/O SCOPE: CPT | Mod: QW,S$GLB,, | Performed by: EMERGENCY MEDICINE

## 2018-02-02 PROCEDURE — 3008F BODY MASS INDEX DOCD: CPT | Mod: S$GLB,,, | Performed by: NURSE PRACTITIONER

## 2018-02-02 PROCEDURE — 99214 OFFICE O/P EST MOD 30 MIN: CPT | Mod: 25,S$GLB,, | Performed by: NURSE PRACTITIONER

## 2018-02-02 RX ORDER — PHENAZOPYRIDINE HYDROCHLORIDE 200 MG/1
200 TABLET, FILM COATED ORAL 3 TIMES DAILY PRN
Qty: 6 TABLET | Refills: 0 | Status: SHIPPED | OUTPATIENT
Start: 2018-02-02 | End: 2018-02-04

## 2018-02-02 RX ORDER — SULFAMETHOXAZOLE AND TRIMETHOPRIM 400; 80 MG/1; MG/1
1 TABLET ORAL 2 TIMES DAILY
Qty: 10 TABLET | Refills: 0 | Status: SHIPPED | OUTPATIENT
Start: 2018-02-02 | End: 2018-02-07

## 2018-02-02 NOTE — PATIENT INSTRUCTIONS
Call [phone] to confirm appointment                                                                      UTI   If your condition worsens or fails to improve we recommend that you receive another evaluation at the ER immediately or contact your PCP to discuss your concerns or return here. You must understand that you've received an urgent care treatment only and that you may be released before all your medical problems are known or treated. You the patient will arrange for followup care as instructed.   If you had cultures done it will take 3-5 days to result. We will call you with the result.   If you are are female and on BCP use additional methods to prevent pregnancy while on the antibiotics and for one cycle after.   Cranberry juice may help. Get the 100% cranberry juice and mix 4 oz of juice with 4 oz of water and drink this 8 oz glass of liquid once a day.   Increase water intake to at least 8-10 glasses/day.  Do not wear contacts with Pyridium as it will stain them.  You may want to wear a panty liner with Pyridium as it may stain your underwear.  Avoid caffeine, alcohol, or spicy foods as they irritate the bladder.    Consider adding over-the-counter PROBIOTICS to decrease some side-effects associated with antibiotics. When a person takes antibiotics, both the harmful bacteria and the beneficial bacteria are killed. A reduction of beneficial bacteria can lead to digestive problems, such as diarrhea, yeast infections and urinary tract infections.      Bladder Infection, Female (Adult)    Urine is normally doesn't have any bacteria in it. But bacteria can get into the urinary tract from the skin around the rectum. Or they can travel in the blood from elsewhere in the body. Once they are in your urinary tract, they can cause infection in the urethra (urethritis), the bladder (cystitis), or the kidneys (pyelonephritis).  The most common place for an infection is in the bladder. This is called a bladder  "infection. This is one of the most common infections in women. Most bladder infections are easily treated. They are not serious unless the infection spreads to the kidney.  The phrases "bladder infection," "UTI," and "cystitis" are often used to describe the same thing. But they are not always the same. Cystitis is an inflammation of the bladder. The most common cause of cystitis is an infection.  Symptoms  The infection causes inflammation in the urethra and bladder. This causes many of the symptoms. The most common symptoms of a bladder infection are:  · Pain or burning when urinating  · Having to urinate more often than usual  · Urgent need to urinate  · Only a small amount of urine comes out  · Blood in urine  · Abdominal discomfort. This is usually in the lower abdomen above the pubic bone.  · Cloudy urine  · Strong- or bad-smelling urine  · Unable to urinate (urinary retention)  · Unable to hold urine in (urinary incontinence)  · Fever  · Loss of appetite  · Confusion (in older adults)  Causes  Bladder infections are not contagious. You can't get one from someone else, from a toilet seat, or from sharing a bath.  The most common cause of bladder infections is bacteria from the bowels. The bacteria get onto the skin around the opening of the urethra. From there, they can get into the urine and travel up to the bladder, causing inflammation and infection. This usually happens because of:  · Wiping improperly after urinating. Always wipe from front to back.  · Bowel incontinence  · Pregnancy  · Procedures such as having a catheter inserted  · Older age  · Not emptying your bladder. This can allow bacteria a chance to grow in your urine.  · Dehydration  · Constipation  · Sex  · Use of a diaphragm for birth control   Treatment  Bladder infections are diagnosed by a urine test. They are treated with antibiotics and usually clear up quickly without complications. Treatment helps prevent a more serious kidney " infection.  Medicines  Medicines can help in the treatment of a bladder infection:  · Take antibiotics until they are used up, even if you feel better. It is important to finish them to make sure the infection has cleared.  · You can use acetaminophen or ibuprofen for pain, fever, or discomfort, unless another medicine was prescribed. If you have chronic liver or kidney disease, talk with your healthcare provider before using these medicines. Also talk with your provider if you've ever had a stomach ulcer or gastrointestinal bleeding, or are taking blood-thinner medicines.  · If you are given phenazopydridine to reduce burning with urination, it will cause your urine to become a bright orange color. This can stain clothing.  Care and prevention  These self-care steps can help prevent future infections:  · Drink plenty of fluids to prevent dehydration and flush out your bladder. Do this unless you must restrict fluids for other health reasons, or your doctor told you not to.  · Proper cleaning after going to the bathroom is important. Wipe from front to back after using the toilet to prevent the spread of bacteria.  · Urinate more often. Don't try to hold urine in for a long time.  · Wear loose-fitting clothes and cotton underwear. Avoid tight-fitting pants.  · Improve your diet and prevent constipation. Eat more fresh fruit and vegetables, and fiber, and less junk and fatty foods.  · Avoid sex until your symptoms are gone.  · Avoid caffeine, alcohol, and spicy foods. These can irritate your bladder.  · Urinate right after intercourse to flush out your bladder.  · If you use birth control pills and have frequent bladder infections, discuss it with your doctor.  Follow-up care  Call your healthcare provider if all symptoms are not gone after 3 days of treatment. This is especially important if you have repeat infections.  If a culture was done, you will be told if your treatment needs to be changed. If directed, you  can call to find out the results.  If X-rays were done, you will be told if the results will affect your treatment.  Call 911  Call 911 if any of the following occur:  · Trouble breathing  · Hard to wake up or confusion  · Fainting or loss of consciousness  · Rapid heart rate  When to seek medical advice  Call your healthcare provider right away if any of these occur:  · Fever of 100.4ºF (38.0ºC) or higher, or as directed by your healthcare provider  · Symptoms are not better by the third day of treatment  · Back or belly (abdominal) pain that gets worse  · Repeated vomiting, or unable to keep medicine down  · Weakness or dizziness  · Vaginal discharge  · Pain, redness, or swelling in the outer vaginal area (labia)  Date Last Reviewed: 10/1/2016  © 4268-3216 [employer]. [address] [place] [address]. All rights reserved. This information is not intended as a substitute for professional medical care. Always follow your healthcare professional's instructions.        Dysuria with Uncertain Cause (Adult)    The urethra is the tube that allows urine to pass out of the body. In a woman, the urethra is the opening above the vagina. In men, the urethra is the opening on the tip of the penis. Dysuria is the feeling of pain or burning in the urethra when passing urine.  Dysuria can be caused by anything that irritates or inflames the urethra. An infection or chemical irritation can cause this reaction. A bladder infection is the most common cause of dysuria in adults. A urine test can diagnose this. A bladder infection needs antibiotic treatment.  Soaps, lotions, colognes and feminine hygiene products can cause dysuria. So can birth control jellies, creams, and foams. It will go away 1 to 3 days after using these irritants.  Sexually transmitted diseases (STDs) such as chlamydia or gonorrhea can cause dysuria. Your healthcare provider may take a culture sample. Your provider may start you on  antibiotic medicine before the culture test returns.  In women who have gone through menopause, dysuria can be from dryness in the lining of the urethra. This can be treated with hormones. Dysuria becomes long-term (chronic) when it lasts for weeks or months. You may need to see a specialist (urologist) to diagnose and treat chronic dysuria.  Home care  These home care tips may help:  · Don't use any chemicals or products that you think may be causing your symptoms.  · If you were given a prescription medicine, take as directed. Be sure to take it until it is all used up.  · If a culture was taken, don't have sex until you have been told that it is negative. This means you don't have an infection. Then follow your healthcare provider's advice to treat your condition.  If a culture was done and it is positive:  · Both you and your sexual partner may need to be treated. This is true even if your partner has no symptoms.  · Contact your healthcare provider or go to an urgent care clinic or the public health department to be looked at and treated.  · Don't have sex until both you and your partner(s) have finished all antibiotics and your healthcare provider says you are no longer contagious.  · Learn about and use safe sex practices. The safest sex is with a partner who has tested negative and only has sex with you. Condoms can prevent STDs from spreading, but they aren't a guarantee.  Follow-up care  Follow up with your healthcare provider, or as advised. If a culture was taken, you may call as directed for the results. If you have an STD, follow up with your provider or the public health department for a complete STD screening, including HIV testing. For more information, contact CDC-INFO at 720-739-0686.  When to seek medical advice  Call your healthcare provider right away if any of these occur:  · You aren't better after 3 days of treatment  · Fever of 100.4ºF (38ºC) or higher, or as directed by your healthcare  provider  · Back or belly pain that gets worse  · You can't urinate because of pain  · New discharge from the urethra, vagina, or penis  · Painful sores on the penis  · Rash or joint pain  · Painful lumps (lymph nodes) in the groin  · Testicle pain or swelling of the scrotum  Date Last Reviewed: 11/1/2016  © 6731-0335 [employer]. [address] [place] [address], [place] [address]. All rights reserved. This information is not intended as a substitute for professional medical care. Always follow your healthcare professional's instructions.

## 2018-02-02 NOTE — PROGRESS NOTES
"Subjective:       Patient ID: Val Gil is a 37 y.o. female.    Vitals:  height is 5' 5" (1.651 m) and weight is 59 kg (130 lb). Her oral temperature is 98 °F (36.7 °C). Her blood pressure is 121/88 and her pulse is 86. Her respiration is 18 and oxygen saturation is 99%.     Chief Complaint: Urinary Tract Infection     Urinary Tract Infection    This is a new problem. The current episode started more than 1 month ago. The problem occurs every urination. The problem has been unchanged. The quality of the pain is described as burning. The pain is at a severity of 10/10. The pain is severe. There has been no fever. The fever has been present for less than 1 day. She is not sexually active. There is no history of pyelonephritis. Pertinent negatives include no behavior changes, chills, discharge, flank pain, frequency, hematuria, nausea, possible pregnancy, urgency, vomiting or rash. She has tried nothing for the symptoms.     Review of Systems   Constitution: Negative for chills and fever.   Skin: Negative for itching and rash.   Musculoskeletal: Negative for back pain.   Gastrointestinal: Negative for abdominal pain, nausea and vomiting.   Genitourinary: Positive for dysuria. Negative for flank pain, frequency, genital sores, hematuria, missed menses, non-menstrual bleeding and urgency.       Objective:      Physical Exam   Constitutional: She is oriented to person, place, and time. She appears well-developed and well-nourished.   HENT:   Head: Normocephalic and atraumatic.   Right Ear: External ear normal.   Left Ear: External ear normal.   Nose: Nose normal. No nasal deformity. No epistaxis.   Mouth/Throat: Oropharynx is clear and moist and mucous membranes are normal.   Eyes: Conjunctivae and lids are normal.   Neck: Trachea normal, normal range of motion and phonation normal. Neck supple.   Cardiovascular: Normal rate, regular rhythm, normal heart sounds and normal pulses.    Pulmonary/Chest: Effort " normal and breath sounds normal.   Abdominal: Soft. Normal appearance and bowel sounds are normal. She exhibits no distension and no mass. There is tenderness in the suprapubic area. There is no rigidity, no rebound, no guarding and no CVA tenderness.   Mildly tender over bladder   Musculoskeletal:   AKA to RLE   Neurological: She is alert and oriented to person, place, and time.   Skin: Skin is warm, dry and intact.   Psychiatric: She has a normal mood and affect. Her speech is normal and behavior is normal. Cognition and memory are normal.   Nursing note and vitals reviewed.      Assessment:       1. Urinary tract infection without hematuria, site unspecified    2. Dysuria        Plan:         Urinary tract infection without hematuria, site unspecified  -     Ambulatory referral to Urology  -     Urine culture  -     sulfamethoxazole-trimethoprim 400-80mg (BACTRIM) 400-80 mg per tablet; Take 1 tablet by mouth 2 (two) times daily.  Dispense: 10 tablet; Refill: 0    Dysuria  -     POCT Urinalysis, Dipstick, Automated, W/O Scope  -     Ambulatory referral to Urology  -     Urine culture  -     phenazopyridine (PYRIDIUM) 200 MG tablet; Take 1 tablet (200 mg total) by mouth 3 (three) times daily as needed for Pain.  Dispense: 6 tablet; Refill: 0  -     C. trachomatis/N. gonorrhoeae by AMP DNA Urine      Patient Instructions   Call [phone] to confirm appointment                                                                      UTI   If your condition worsens or fails to improve we recommend that you receive another evaluation at the ER immediately or contact your PCP to discuss your concerns or return here. You must understand that you've received an urgent care treatment only and that you may be released before all your medical problems are known or treated. You the patient will arrange for followup care as instructed.   If you had cultures done it will take 3-5 days to result. We will call you with the result.  "  If you are are female and on BCP use additional methods to prevent pregnancy while on the antibiotics and for one cycle after.   Cranberry juice may help. Get the 100% cranberry juice and mix 4 oz of juice with 4 oz of water and drink this 8 oz glass of liquid once a day.   Increase water intake to at least 8-10 glasses/day.  Do not wear contacts with Pyridium as it will stain them.  You may want to wear a panty liner with Pyridium as it may stain your underwear.  Avoid caffeine, alcohol, or spicy foods as they irritate the bladder.    Consider adding over-the-counter PROBIOTICS to decrease some side-effects associated with antibiotics. When a person takes antibiotics, both the harmful bacteria and the beneficial bacteria are killed. A reduction of beneficial bacteria can lead to digestive problems, such as diarrhea, yeast infections and urinary tract infections.      Bladder Infection, Female (Adult)    Urine is normally doesn't have any bacteria in it. But bacteria can get into the urinary tract from the skin around the rectum. Or they can travel in the blood from elsewhere in the body. Once they are in your urinary tract, they can cause infection in the urethra (urethritis), the bladder (cystitis), or the kidneys (pyelonephritis).  The most common place for an infection is in the bladder. This is called a bladder infection. This is one of the most common infections in women. Most bladder infections are easily treated. They are not serious unless the infection spreads to the kidney.  The phrases "bladder infection," "UTI," and "cystitis" are often used to describe the same thing. But they are not always the same. Cystitis is an inflammation of the bladder. The most common cause of cystitis is an infection.  Symptoms  The infection causes inflammation in the urethra and bladder. This causes many of the symptoms. The most common symptoms of a bladder infection are:  · Pain or burning when urinating  · Having to " urinate more often than usual  · Urgent need to urinate  · Only a small amount of urine comes out  · Blood in urine  · Abdominal discomfort. This is usually in the lower abdomen above the pubic bone.  · Cloudy urine  · Strong- or bad-smelling urine  · Unable to urinate (urinary retention)  · Unable to hold urine in (urinary incontinence)  · Fever  · Loss of appetite  · Confusion (in older adults)  Causes  Bladder infections are not contagious. You can't get one from someone else, from a toilet seat, or from sharing a bath.  The most common cause of bladder infections is bacteria from the bowels. The bacteria get onto the skin around the opening of the urethra. From there, they can get into the urine and travel up to the bladder, causing inflammation and infection. This usually happens because of:  · Wiping improperly after urinating. Always wipe from front to back.  · Bowel incontinence  · Pregnancy  · Procedures such as having a catheter inserted  · Older age  · Not emptying your bladder. This can allow bacteria a chance to grow in your urine.  · Dehydration  · Constipation  · Sex  · Use of a diaphragm for birth control   Treatment  Bladder infections are diagnosed by a urine test. They are treated with antibiotics and usually clear up quickly without complications. Treatment helps prevent a more serious kidney infection.  Medicines  Medicines can help in the treatment of a bladder infection:  · Take antibiotics until they are used up, even if you feel better. It is important to finish them to make sure the infection has cleared.  · You can use acetaminophen or ibuprofen for pain, fever, or discomfort, unless another medicine was prescribed. If you have chronic liver or kidney disease, talk with your healthcare provider before using these medicines. Also talk with your provider if you've ever had a stomach ulcer or gastrointestinal bleeding, or are taking blood-thinner medicines.  · If you are  given phenazopydridine to reduce burning with urination, it will cause your urine to become a bright orange color. This can stain clothing.  Care and prevention  These self-care steps can help prevent future infections:  · Drink plenty of fluids to prevent dehydration and flush out your bladder. Do this unless you must restrict fluids for other health reasons, or your doctor told you not to.  · Proper cleaning after going to the bathroom is important. Wipe from front to back after using the toilet to prevent the spread of bacteria.  · Urinate more often. Don't try to hold urine in for a long time.  · Wear loose-fitting clothes and cotton underwear. Avoid tight-fitting pants.  · Improve your diet and prevent constipation. Eat more fresh fruit and vegetables, and fiber, and less junk and fatty foods.  · Avoid sex until your symptoms are gone.  · Avoid caffeine, alcohol, and spicy foods. These can irritate your bladder.  · Urinate right after intercourse to flush out your bladder.  · If you use birth control pills and have frequent bladder infections, discuss it with your doctor.  Follow-up care  Call your healthcare provider if all symptoms are not gone after 3 days of treatment. This is especially important if you have repeat infections.  If a culture was done, you will be told if your treatment needs to be changed. If directed, you can call to find out the results.  If X-rays were done, you will be told if the results will affect your treatment.  Call 911  Call 911 if any of the following occur:  · Trouble breathing  · Hard to wake up or confusion  · Fainting or loss of consciousness  · Rapid heart rate  When to seek medical advice  Call your healthcare provider right away if any of these occur:  · Fever of 100.4ºF (38.0ºC) or higher, or as directed by your healthcare provider  · Symptoms are not better by the third day of treatment  · Back or belly (abdominal) pain that gets worse  · Repeated vomiting, or unable to  keep medicine down  · Weakness or dizziness  · Vaginal discharge  · Pain, redness, or swelling in the outer vaginal area (labia)  Date Last Reviewed: 10/1/2016  © 5026-2962 [employer]. [address] [place] [address], [place] [address]. All rights reserved. This information is not intended as a substitute for professional medical care. Always follow your healthcare professional's instructions.        Dysuria with Uncertain Cause (Adult)    The urethra is the tube that allows urine to pass out of the body. In a woman, the urethra is the opening above the vagina. In men, the urethra is the opening on the tip of the penis. Dysuria is the feeling of pain or burning in the urethra when passing urine.  Dysuria can be caused by anything that irritates or inflames the urethra. An infection or chemical irritation can cause this reaction. A bladder infection is the most common cause of dysuria in adults. A urine test can diagnose this. A bladder infection needs antibiotic treatment.  Soaps, lotions, colognes and feminine hygiene products can cause dysuria. So can birth control jellies, creams, and foams. It will go away 1 to 3 days after using these irritants.  Sexually transmitted diseases (STDs) such as chlamydia or gonorrhea can cause dysuria. Your healthcare provider may take a culture sample. Your provider may start you on antibiotic medicine before the culture test returns.  In women who have gone through menopause, dysuria can be from dryness in the lining of the urethra. This can be treated with hormones. Dysuria becomes long-term (chronic) when it lasts for weeks or months. You may need to see a specialist (urologist) to diagnose and treat chronic dysuria.  Home care  These home care tips may help:  · Don't use any chemicals or products that you think may be causing your symptoms.  · If you were given a prescription medicine, take as directed. Be sure to take it until it is all used up.  · If a culture was  taken, don't have sex until you have been told that it is negative. This means you don't have an infection. Then follow your healthcare provider's advice to treat your condition.  If a culture was done and it is positive:  · Both you and your sexual partner may need to be treated. This is true even if your partner has no symptoms.  · Contact your healthcare provider or go to an urgent care clinic or the public health department to be looked at and treated.  · Don't have sex until both you and your partner(s) have finished all antibiotics and your healthcare provider says you are no longer contagious.  · Learn about and use safe sex practices. The safest sex is with a partner who has tested negative and only has sex with you. Condoms can prevent STDs from spreading, but they aren't a guarantee.  Follow-up care  Follow up with your healthcare provider, or as advised. If a culture was taken, you may call as directed for the results. If you have an STD, follow up with your provider or the public health department for a complete STD screening, including HIV testing. For more information, contact CDC-INFO at 887-087-0363.  When to seek medical advice  Call your healthcare provider right away if any of these occur:  · You aren't better after 3 days of treatment  · Fever of 100.4ºF (38ºC) or higher, or as directed by your healthcare provider  · Back or belly pain that gets worse  · You can't urinate because of pain  · New discharge from the urethra, vagina, or penis  · Painful sores on the penis  · Rash or joint pain  · Painful lumps (lymph nodes) in the groin  · Testicle pain or swelling of the scrotum  Date Last Reviewed: 11/1/2016  © 9803-1165 [employer]. [address] [place] [address], [place] [address]. All rights reserved. This information is not intended as a substitute for professional medical care. Always follow your healthcare professional's instructions.

## 2018-02-05 LAB
BACTERIA UR CULT: NO GROWTH
BACTERIA UR CULT: NORMAL

## 2018-02-06 LAB
C TRACH RRNA SPEC QL NAA+PROBE: NEGATIVE
N GONORRHOEA RRNA SPEC QL NAA+PROBE: NEGATIVE

## 2018-02-15 ENCOUNTER — OFFICE VISIT (OUTPATIENT)
Dept: OBSTETRICS AND GYNECOLOGY | Facility: CLINIC | Age: 38
End: 2018-02-15
Payer: MEDICAID

## 2018-02-15 ENCOUNTER — PROCEDURE VISIT (OUTPATIENT)
Dept: OBSTETRICS AND GYNECOLOGY | Facility: CLINIC | Age: 38
End: 2018-02-15
Payer: MEDICAID

## 2018-02-15 VITALS
DIASTOLIC BLOOD PRESSURE: 74 MMHG | SYSTOLIC BLOOD PRESSURE: 118 MMHG | WEIGHT: 119.94 LBS | HEIGHT: 65 IN | BODY MASS INDEX: 19.98 KG/M2

## 2018-02-15 DIAGNOSIS — Z86.19 HISTORY OF ELISA POSITIVE FOR HSV: ICD-10-CM

## 2018-02-15 DIAGNOSIS — N84.0 ENDOMETRIAL POLYP: ICD-10-CM

## 2018-02-15 DIAGNOSIS — M79.10 MUSCLE PAIN: ICD-10-CM

## 2018-02-15 DIAGNOSIS — Z87.42 HISTORY OF OVARIAN CYST: Primary | ICD-10-CM

## 2018-02-15 DIAGNOSIS — Z87.42 HISTORY OF OVARIAN CYST: ICD-10-CM

## 2018-02-15 DIAGNOSIS — N76.1 SUBACUTE VAGINITIS: ICD-10-CM

## 2018-02-15 DIAGNOSIS — R30.0 DYSURIA: ICD-10-CM

## 2018-02-15 DIAGNOSIS — Z01.419 WELL WOMAN EXAM WITH ROUTINE GYNECOLOGICAL EXAM: ICD-10-CM

## 2018-02-15 PROCEDURE — 76856 US EXAM PELVIC COMPLETE: CPT | Mod: 26,S$PBB,, | Performed by: OBSTETRICS & GYNECOLOGY

## 2018-02-15 PROCEDURE — 99385 PREV VISIT NEW AGE 18-39: CPT | Mod: S$PBB,25,, | Performed by: OBSTETRICS & GYNECOLOGY

## 2018-02-15 PROCEDURE — 88175 CYTOPATH C/V AUTO FLUID REDO: CPT

## 2018-02-15 PROCEDURE — 87086 URINE CULTURE/COLONY COUNT: CPT

## 2018-02-15 PROCEDURE — 99999 PR PBB SHADOW E&M-EST. PATIENT-LVL III: CPT | Mod: PBBFAC,,, | Performed by: OBSTETRICS & GYNECOLOGY

## 2018-02-15 PROCEDURE — 76856 US EXAM PELVIC COMPLETE: CPT | Mod: PBBFAC,PO

## 2018-02-15 PROCEDURE — 99213 OFFICE O/P EST LOW 20 MIN: CPT | Mod: PBBFAC,25,PO | Performed by: OBSTETRICS & GYNECOLOGY

## 2018-02-15 PROCEDURE — 87480 CANDIDA DNA DIR PROBE: CPT

## 2018-02-15 PROCEDURE — 81003 URINALYSIS AUTO W/O SCOPE: CPT

## 2018-02-15 PROCEDURE — 3008F BODY MASS INDEX DOCD: CPT | Mod: ,,, | Performed by: OBSTETRICS & GYNECOLOGY

## 2018-02-15 RX ORDER — VALACYCLOVIR HYDROCHLORIDE 500 MG/1
500 TABLET, FILM COATED ORAL 3 TIMES DAILY
Qty: 30 TABLET | Refills: 3 | Status: SHIPPED | OUTPATIENT
Start: 2018-02-15 | End: 2019-02-21

## 2018-02-15 RX ORDER — GABAPENTIN 400 MG/1
300 CAPSULE ORAL 2 TIMES DAILY
Refills: 0 | COMMUNITY
Start: 2018-02-06 | End: 2019-07-01

## 2018-02-15 RX ORDER — FLUCONAZOLE 150 MG/1
150 TABLET ORAL ONCE
Qty: 1 TABLET | Refills: 3 | Status: SHIPPED | OUTPATIENT
Start: 2018-02-15 | End: 2018-02-15

## 2018-02-15 RX ORDER — SULFAMETHOXAZOLE AND TRIMETHOPRIM 800; 160 MG/1; MG/1
1 TABLET ORAL 2 TIMES DAILY
Qty: 20 TABLET | Refills: 1 | Status: SHIPPED | OUTPATIENT
Start: 2018-02-15 | End: 2019-02-21

## 2018-02-15 RX ORDER — QUETIAPINE FUMARATE 100 MG/1
TABLET, FILM COATED ORAL
Refills: 0 | COMMUNITY
Start: 2018-02-08

## 2018-02-15 RX ORDER — IBUPROFEN 200 MG
TABLET ORAL
Refills: 0 | COMMUNITY
Start: 2018-01-17 | End: 2019-07-01

## 2018-02-15 RX ORDER — BUPRENORPHINE 2 MG/1
TABLET SUBLINGUAL
Refills: 0 | COMMUNITY
Start: 2018-01-13

## 2018-02-15 RX ORDER — DIAZEPAM 10 MG/1
TABLET ORAL
Refills: 0 | COMMUNITY
Start: 2018-01-19

## 2018-02-15 RX ORDER — SERTRALINE HYDROCHLORIDE 100 MG/1
75 TABLET, FILM COATED ORAL NIGHTLY
Refills: 1 | COMMUNITY
Start: 2018-02-08 | End: 2019-07-01

## 2018-02-15 RX ORDER — METHOCARBAMOL 500 MG/1
500 TABLET, FILM COATED ORAL 3 TIMES DAILY
Qty: 30 TABLET | Refills: 3 | Status: SHIPPED | OUTPATIENT
Start: 2018-02-15 | End: 2018-05-16 | Stop reason: SDUPTHER

## 2018-02-15 RX ORDER — LAMOTRIGINE 25 MG/1
TABLET ORAL
Refills: 0 | COMMUNITY
Start: 2018-02-10

## 2018-02-15 NOTE — PROGRESS NOTES
Subjective:       Patient ID: Val Gil is a 37 y.o. female.    Chief Complaint: Gynecologic Exam; Dysuria; and Dysmenorrhea    Patient presents for GYN exam including Pap smear.  She has recently had dysuria and vaginal burning with introital and vulvar inflammation and redness.  Discharge is also been noted.  She has been treated with Macrobid and then Bactrim DS which was successful.  Patient has a history of HSV without recent outbreaks and has used Robaxin for muscle spasm and pain.  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    HPI  Review of Systems   Gastrointestinal: Negative for abdominal distention, abdominal pain, constipation and nausea.   Genitourinary: Negative for dyspareunia, dysuria, genital sores, pelvic pain, vaginal bleeding and vaginal discharge.       Objective:      Physical Exam   Constitutional: She appears well-developed and well-nourished.   Pulmonary/Chest: Right breast exhibits no mass, no nipple discharge, no skin change and no tenderness. Left breast exhibits no mass, no nipple discharge, no skin change and no tenderness.   Abdominal: Soft. Bowel sounds are normal. She exhibits no distension and no mass. There is no tenderness. There is no rebound and no guarding. Hernia confirmed negative in the right inguinal area and confirmed negative in the left inguinal area.   Genitourinary: Rectum normal, vagina normal and uterus normal. No breast tenderness or discharge. There is no lesion on the right labia. There is no lesion on the left labia. Uterus is not fixed and not tender. Cervix exhibits no motion tenderness, no discharge and no friability. Right adnexum displays no mass, no tenderness and no fullness. Left adnexum displays no mass, no tenderness and no fullness. No tenderness in the vagina. No vaginal discharge found.   Lymphadenopathy:        Right: No inguinal adenopathy present.        Left: No inguinal  adenopathy present.       Assessment:       1. History of ovarian cyst    2. Well woman exam with routine gynecological exam    3. Dysuria    4. Subacute vaginitis    5. Muscle pain    6. History of JOVANA positive for HSV        Plan:       Annual GYN visit with Pap.  Start mammograms at age 39.  Urinalysis submitted to the lab along with affirm test and Pap smear.  Ultrasound is done today which shows a possible endometrial polyp and large amount of stool in the lower left quadrant.  Remainder of pelvic ultrasound is normal.  Patient to inquire for dosage or make of nicotine patch usual she was in the hospital and a prescription will be then called out.  Other prescriptions sent to pharmacy for potential recurrence of bladder infection, potential outbreak for herpes, potential yeast overgrowth, muscle medication (Robaxin).

## 2018-02-16 LAB
BILIRUB UR QL STRIP: NEGATIVE
CANDIDA RRNA VAG QL PROBE: NEGATIVE
CLARITY UR REFRACT.AUTO: CLEAR
COLOR UR AUTO: YELLOW
G VAGINALIS RRNA GENITAL QL PROBE: POSITIVE
GLUCOSE UR QL STRIP: NEGATIVE
HGB UR QL STRIP: NEGATIVE
KETONES UR QL STRIP: NEGATIVE
LEUKOCYTE ESTERASE UR QL STRIP: NEGATIVE
NITRITE UR QL STRIP: NEGATIVE
PH UR STRIP: 6 [PH] (ref 5–8)
PROT UR QL STRIP: NEGATIVE
SP GR UR STRIP: 1 (ref 1–1.03)
T VAGINALIS RRNA GENITAL QL PROBE: NEGATIVE
URN SPEC COLLECT METH UR: NORMAL
UROBILINOGEN UR STRIP-ACNC: NEGATIVE EU/DL

## 2018-02-18 LAB — BACTERIA UR CULT: NO GROWTH

## 2018-02-19 ENCOUNTER — TELEPHONE (OUTPATIENT)
Dept: OBSTETRICS AND GYNECOLOGY | Facility: CLINIC | Age: 38
End: 2018-02-19

## 2018-02-19 DIAGNOSIS — R30.1 PAINFUL BLADDER SPASM: Primary | ICD-10-CM

## 2018-02-19 RX ORDER — PHENAZOPYRIDINE HYDROCHLORIDE 200 MG/1
200 TABLET, FILM COATED ORAL
Qty: 30 TABLET | Refills: 3 | Status: SHIPPED | OUTPATIENT
Start: 2018-02-19 | End: 2018-07-01 | Stop reason: SDUPTHER

## 2018-02-19 NOTE — TELEPHONE ENCOUNTER
----- Message from Davina Calero sent at 2/19/2018  2:35 PM CST -----  Contact: [phone] self  Patient would like a Muscle relaxer that's specific for Bladder spasms called in to [facility]. Please call and advise.

## 2018-02-20 NOTE — TELEPHONE ENCOUNTER
Patient is taking pyridium for 3 months and it is not helping.  Could you call in the B&O suppositories

## 2018-02-21 ENCOUNTER — TELEPHONE (OUTPATIENT)
Dept: OBSTETRICS AND GYNECOLOGY | Facility: CLINIC | Age: 38
End: 2018-02-21

## 2018-02-21 NOTE — TELEPHONE ENCOUNTER
Let patient know that the suppositories are belladonna and opium which may not be a good selection due to the Subutex she is taking.  Can try Uribel and if that is not successful, consider visit with urology

## 2018-02-21 NOTE — LETTER
February 21, 2018    Val Gil  [address] [facility] [place] [address]             [facility]  [address], [address]  [address] [facility]  [place] [address]  Phone: [phone] Dear Ms. Gil:    The results of your most recent Pap smear are normal. This means that no cancerous or precancerous cells were seen. We recommend that you come back in 1 year for your annual exam and 1 years for your next Pap smear.    If you have any questions or concerns, please don't hesitate to call.    Sincerely,        Dr. Shay Duvall

## 2018-03-29 ENCOUNTER — TELEPHONE (OUTPATIENT)
Dept: OBSTETRICS AND GYNECOLOGY | Facility: CLINIC | Age: 38
End: 2018-03-29

## 2018-03-29 NOTE — TELEPHONE ENCOUNTER
----- Message from Coreen Dawkins sent at 3/28/2018  1:28 PM CDT -----  Contact: Minna or Diane from [facility]/ [phone]  Pharmacy called in to speak with you since methocarbalmol medication interacts with patients other medications.    Please call and advise.

## 2018-03-29 NOTE — TELEPHONE ENCOUNTER
Diane with [facility] pharmacy states that patients rx of Robaxin interacts with other medications that patient is currently taking. States that it is a level 3 interaction which is the lowest interaction. Diane reported that the pharmacist filled the rx for the patient last night. Just want physician to be aware and make sure that this okay.

## 2018-05-16 DIAGNOSIS — M79.10 MUSCLE PAIN: ICD-10-CM

## 2018-05-16 RX ORDER — METHOCARBAMOL 500 MG/1
500 TABLET, FILM COATED ORAL 3 TIMES DAILY
Qty: 30 TABLET | Refills: 1 | Status: SHIPPED | OUTPATIENT
Start: 2018-05-16 | End: 2018-07-01 | Stop reason: SDUPTHER

## 2018-05-16 NOTE — TELEPHONE ENCOUNTER
----- Message from Magy Sanchez sent at 5/16/2018 10:12 AM CDT -----  Contact: Self [phone]  Patient is calling to get a 3-4 day supply on her medication due to she forgot her medication at home sent to [facility]  [phone]    1. methocarbamol (ROBAXIN) 500 MG Tab 30 tablet   Please advise

## 2018-05-17 RX ORDER — METHOCARBAMOL 500 MG/1
500 TABLET, FILM COATED ORAL 3 TIMES DAILY
Qty: 30 TABLET | Refills: 0 | Status: SHIPPED | OUTPATIENT
Start: 2018-05-17 | End: 2018-05-27

## 2018-05-17 NOTE — TELEPHONE ENCOUNTER
----- Message from Piper Uriostegui sent at 5/16/2018  1:02 PM CDT -----  Contact: J[facility] with [facility] - [phone] ( cell )   Jairo with [facility] - [phone] ( cell )   She needs a few days she is out of town , she needs a 3 day supply      methocarbamol (ROBAXIN) 500 MG Tab    Send to : Jairo with [facility] - [phone] ( cell )     [address] , [address]  [phone]

## 2018-05-17 NOTE — TELEPHONE ENCOUNTER
Okay to call and Robaxin 500 mg, dispense 30, one 3 times a day to the pharmacy noted in the previous message.  Since it is out of town, they might need information such as a RA[facility] number or other identifier.

## 2018-06-14 ENCOUNTER — PATIENT MESSAGE (OUTPATIENT)
Dept: OBSTETRICS AND GYNECOLOGY | Facility: CLINIC | Age: 38
End: 2018-06-14

## 2018-06-15 ENCOUNTER — TELEPHONE (OUTPATIENT)
Dept: OBSTETRICS AND GYNECOLOGY | Facility: CLINIC | Age: 38
End: 2018-06-15

## 2018-06-15 NOTE — TELEPHONE ENCOUNTER
Dr Duvall,   I saw in my chart here that my test results with you turned out positive for Gardnerella vaginalis.   I was just in ER at [facility] on June 12th and don't see my test results yet. The ER doctor said that I had bacterial infection of my vagina and ordered Cipro and Flagyl for me. Those results are not in my chart yet here. Can you see them and let me know what the culture came back as please?   Thank you,   Val Gil

## 2018-06-29 DIAGNOSIS — M79.10 MUSCLE PAIN: ICD-10-CM

## 2018-07-01 RX ORDER — METHOCARBAMOL 500 MG/1
500 TABLET, FILM COATED ORAL 3 TIMES DAILY
Qty: 30 TABLET | Refills: 3 | Status: SHIPPED | OUTPATIENT
Start: 2018-07-01 | End: 2018-07-11

## 2018-07-01 RX ORDER — PHENAZOPYRIDINE HYDROCHLORIDE 200 MG/1
TABLET, FILM COATED ORAL
Qty: 30 TABLET | Refills: 2 | Status: SHIPPED | OUTPATIENT
Start: 2018-07-01 | End: 2018-07-11

## 2019-02-21 ENCOUNTER — HOSPITAL ENCOUNTER (EMERGENCY)
Facility: HOSPITAL | Age: 39
Discharge: HOME | End: 2019-02-21
Attending: EMERGENCY MEDICINE
Payer: MEDICAID

## 2019-02-21 VITALS
TEMPERATURE: 99 F | RESPIRATION RATE: 18 BRPM | HEIGHT: 65 IN | BODY MASS INDEX: 19.16 KG/M2 | DIASTOLIC BLOOD PRESSURE: 79 MMHG | WEIGHT: 115 LBS | HEART RATE: 57 BPM | SYSTOLIC BLOOD PRESSURE: 146 MMHG | OXYGEN SATURATION: 99 %

## 2019-02-21 DIAGNOSIS — M62.830 LUMBAR PARASPINAL MUSCLE SPASM: Primary | ICD-10-CM
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
## 2019-02-21 DIAGNOSIS — S70.01XA CONTUSION OF RIGHT HIP, INITIAL ENCOUNTER: ICD-10-CM

## 2019-02-21 DIAGNOSIS — Z89.619 HISTORY OF LEG AMPUTATION: ICD-10-CM

## 2019-02-21 LAB
B-HCG UR QL: NEGATIVE
BACTERIA #/AREA URNS AUTO: ABNORMAL /HPF
BILIRUB UR QL STRIP: NEGATIVE
CLARITY UR REFRACT.AUTO: CLEAR
COLOR UR AUTO: ABNORMAL
CTP QC/QA: YES
GLUCOSE UR QL STRIP: NEGATIVE
HGB UR QL STRIP: ABNORMAL
KETONES UR QL STRIP: ABNORMAL
LEUKOCYTE ESTERASE UR QL STRIP: NEGATIVE
MICROSCOPIC COMMENT: ABNORMAL
NITRITE UR QL STRIP: NEGATIVE
PH UR STRIP: 8 [PH] (ref 5–8)
PROT UR QL STRIP: NEGATIVE
RBC #/AREA URNS AUTO: 9 /HPF (ref 0–4)
SP GR UR STRIP: 1 (ref 1–1.03)
SQUAMOUS #/AREA URNS AUTO: 2 /HPF
URN SPEC COLLECT METH UR: ABNORMAL
WBC #/AREA URNS AUTO: 0 /HPF (ref 0–5)

## 2019-02-21 PROCEDURE — 96372 THER/PROPH/DIAG INJ SC/IM: CPT

## 2019-02-21 PROCEDURE — 99284 PR EMERGENCY DEPT VISIT,LEVEL IV: ICD-10-PCS | Mod: ,,, | Performed by: EMERGENCY MEDICINE

## 2019-02-21 PROCEDURE — 99284 EMERGENCY DEPT VISIT MOD MDM: CPT | Mod: ,,, | Performed by: EMERGENCY MEDICINE

## 2019-02-21 PROCEDURE — 81001 URINALYSIS AUTO W/SCOPE: CPT

## 2019-02-21 PROCEDURE — 99284 EMERGENCY DEPT VISIT MOD MDM: CPT | Mod: 25

## 2019-02-21 PROCEDURE — 81025 URINE PREGNANCY TEST: CPT | Performed by: PHYSICIAN ASSISTANT

## 2019-02-21 PROCEDURE — 63600175 PHARM REV CODE 636 W HCPCS: Performed by: EMERGENCY MEDICINE

## 2019-02-21 RX ORDER — NAPROXEN 500 MG/1
500 TABLET ORAL 2 TIMES DAILY WITH MEALS
Qty: 10 TABLET | Refills: 0 | Status: SHIPPED | OUTPATIENT
Start: 2019-02-21 | End: 2019-02-26

## 2019-02-21 RX ORDER — KETOROLAC TROMETHAMINE 30 MG/ML
10 INJECTION, SOLUTION INTRAMUSCULAR; INTRAVENOUS
Status: COMPLETED | OUTPATIENT
Start: 2019-02-21 | End: 2019-02-21

## 2019-02-21 RX ORDER — KETOROLAC TROMETHAMINE 30 MG/ML
10 INJECTION, SOLUTION INTRAMUSCULAR; INTRAVENOUS
Status: DISCONTINUED | OUTPATIENT
Start: 2019-02-21 | End: 2019-02-21

## 2019-02-21 RX ORDER — CYCLOBENZAPRINE HCL 5 MG
5 TABLET ORAL
Status: DISCONTINUED | OUTPATIENT
Start: 2019-02-21 | End: 2019-02-21 | Stop reason: HOSPADM

## 2019-02-21 RX ORDER — ORPHENADRINE CITRATE 100 MG/1
100 TABLET, EXTENDED RELEASE ORAL 2 TIMES DAILY
Qty: 20 TABLET | Refills: 0 | Status: SHIPPED | OUTPATIENT
Start: 2019-02-21 | End: 2019-03-03

## 2019-02-21 RX ORDER — ORPHENADRINE CITRATE 30 MG/ML
30 INJECTION INTRAMUSCULAR; INTRAVENOUS
Status: COMPLETED | OUTPATIENT
Start: 2019-02-21 | End: 2019-02-21

## 2019-02-21 RX ADMIN — ORPHENADRINE CITRATE 30 MG: 30 INJECTION INTRAMUSCULAR; INTRAVENOUS at 06:02

## 2019-02-21 RX ADMIN — KETOROLAC TROMETHAMINE 10 MG: 30 INJECTION, SOLUTION INTRAMUSCULAR at 05:02

## 2019-02-21 NOTE — ED TRIAGE NOTES
Patient states he slipped and fell 2 weeks ago with bruise to right muscle/amputation area. Unable to get appointment for 1 month with ortho. On Subutex/Valium/Ativan.

## 2019-02-21 NOTE — ED PROVIDER NOTES
"Encounter Date: 2/21/2019       History     Chief Complaint   Patient presents with    Fall     i wan using my crutches, fell on same side as amputee, no medical tx, still having inc pain     Patient is a 38-yo white female with a PMHx of bone cancer s/p right above knee amputation who presents to the ED for urgent evaluation of right hip pain. She reports a slip and fall two weeks ago while using her crutches onto her right hip. She reports she was able to get back up normally without significant pain; however, over the past two weeks, her hip and buttock pain has become progressively worse. She describes it as a "nerve pain", stating that the pain shoots into her right buttock and her right back with certain movements and has associated numbness/tingling. She states the pain is worst in the morning with gradual relief during the course of the day. She has tried heating pads and topical CBD oil with some relief. She also reports 3-day history of dysuria that is relieved with Pyridium.  She denies fever/chills, chest pain, SOB, skin rash, weakness, or bony tenderness.      The history is provided by the patient.     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    Anxiety     Bone cancer     Depression      Past Surgical History:   Procedure Laterality Date    BLOCK-LUMBAR-SYMPATHETIC Right 7/9/2015    [facility]formed [facility] at [facility]    DISARTICULATION-HIP Right 7/10/2015    Performed by [facility]iga [facility] at [facility] OR 2ND FLR    LEG AMPUTATION AT HIP Left 7/2015     Family History   Problem Relation Age of Onset    Cancer Paternal Uncle         lung    Cancer Maternal Grandmother         breast    Heart attack Maternal Grandmother     Arthritis Maternal Grandmother     Heart attack Maternal Grandfather     Arthritis Maternal Grandfather     " Diabetes Paternal Grandmother     Hypertension Paternal Grandfather     Heart attack Paternal Grandfather     No Known Problems Father      Social History     Tobacco Use    Smoking status: Current Every Day Smoker     Packs/day: 1.00     Types: Cigarettes    Smokeless tobacco: Never Used   Substance Use Topics    Alcohol use: No    Drug use: No     Review of Systems   Constitutional: Negative for chills and fever.   HENT: Negative for sore throat.    Eyes: Negative for visual disturbance.   Respiratory: Negative for shortness of breath.    Cardiovascular: Negative for chest pain.   Gastrointestinal: Negative for abdominal pain, constipation, diarrhea, nausea and vomiting.   Genitourinary: Positive for dysuria. Negative for frequency.   Musculoskeletal: Positive for arthralgias, back pain and myalgias.   Skin: Negative for wound.   Neurological: Positive for numbness. Negative for dizziness and weakness.   Psychiatric/Behavioral: Negative for dysphoric mood.       Physical Exam     Initial Vitals [02/21/19 1446]   BP Pulse Resp Temp SpO2   125/68 85 18 98.8 °F (37.1 °C) 96 %      MAP       --         Physical Exam    Nursing note and vitals reviewed.  Constitutional: She appears well-developed and well-nourished. She is not diaphoretic. No distress.   HENT:   Head: Normocephalic and atraumatic.   Mouth/Throat: Oropharynx is clear and moist. No oropharyngeal exudate.   Eyes: Conjunctivae and EOM are normal. Pupils are equal, round, and reactive to light.   Neck: Normal range of motion. Neck supple.   Cardiovascular: Normal rate, regular rhythm, normal heart sounds and intact distal pulses.   Pulmonary/Chest: Breath sounds normal. No respiratory distress.   Abdominal: Soft. Bowel sounds are normal. There is no tenderness.   Musculoskeletal:   No midline spinal tenderness.  Right above knee amputation.  Tenderness to deep palpation over the right buttock.  Pain with light palpation superior to the right iliac  crest with muscular spasm.  Paresthesia noted to the right limb.     Neurological: She is alert and oriented to person, place, and time. She has normal strength.   Skin: Skin is warm and dry.   Psychiatric: She has a normal mood and affect. Thought content normal.         ED Course   Procedures  Labs Reviewed - No data to display       Imaging Results    None          Medical Decision Making:   Initial Assessment:   38WF with PMHx of bone CA s/p right above knee amputation who presents to the ED for urgent evaluation of right hip and buttock pain following a slip and fall onto the limb 2 weeks ago. PE reveals a non-toxic, afebrile, well-appearing female in no acute distress. Vital signs are stable.  Differential Diagnosis:   DDx includes but is not limited to: sciatica, muscular spasm, vertebral fracture, herniated disc.  Clinical Tests:   Lab Tests: Ordered and Reviewed  Radiological Study: Ordered and Reviewed  ED Management:  Will obtain UA and X-ray right hip. Will give Flexeril 5 mg for symptoms.    Patient reports trying Flexeril in the past and states it has not worked for her.  Would like a nonsedating medication.  Will give Toradol and reassess.    UA unremarkable without evidence of infection.  Hip x-ray without acute displaced fracture or pelvic dislocation.  Will obtain a pelvic CT for further evaluation.    Patient reports symptoms unrelieved with Toradol.  Will try Norflex.    CT results without evidence of acute fracture or other pathology.  Patient reports some relief with Norflex.  I suspect symptoms are secondary to a muscle spasm.  Will give prescription for naproxen and Norflex.  The patient advised to follow up with PCP and physical therapy.  ED return precautions given.  Patient verbalized Understanding and is agreeable. I have discussed patient case with my supervisory physician, who is in agreement with my assessment and plan.                        Clinical Impression:       ICD-10-CM  ICD-9-CM   1. Muscle spasm M62.838 728.85         Disposition:   Disposition: Discharged  Condition: Stable                        Bev Mayfield PA-C  02/22/19 0216

## 2019-02-21 NOTE — ED NOTES
Patient identifiers verified and correct for MS Gil  C/C: Fall Right hip pain SEE NN  APPEARANCE: awake and alert in NAD.  SKIN: warm, dry and intact. No breakdown or bruising.  MUSCULOSKELETAL: Patient moving all extremities spontaneously, no obvious swelling or deformities noted. Ambulates independently.  RESPIRATORY: Denies shortness of breath.Respirations unlabored.   CARDIAC: Denies CP, 2+ distal pulses; no peripheral edema  ABDOMEN: S/ND/NT, Denies nausea  : voids spontaneously, denies difficulty  Neurologic: AAO x 4; follows commands equal strength in all extremities; denies numbness/tingling. Denies dizziness Denies weakness,pain to right hip/amputation area after fall

## 2019-02-22 NOTE — DISCHARGE INSTRUCTIONS
Your X-ray and CT scan were negative for acute fracture or dislocation.  Please take Naproxen and Norflex twice daily for 5 days for treatment of muscle spasms.  Follow-up with your PCP and Physical Therapy.  Return to the ED for any concerning symptoms.    Our goal in the emergency department is to always give you outstanding care and exceptional service. You may receive a survey by mail or e-mail in the next week regarding your experience in our ED. We would greatly appreciate your completing and returning the survey. Your feedback provides us with a way to recognize our staff who give very good care and it helps us learn how to improve when your experience was below our aspiration of excellence.

## 2019-02-22 NOTE — ED NOTES
Denies questions or concerns regarding discharge instructions or fu. No acute distress noted. Went to lobby in wheelchair with mother.

## 2019-03-12 ENCOUNTER — HOSPITAL ENCOUNTER (EMERGENCY)
Facility: HOSPITAL | Age: 39
Discharge: HOME | End: 2019-03-12
Attending: EMERGENCY MEDICINE
Payer: MEDICAID

## 2019-03-12 VITALS
WEIGHT: 115 LBS | RESPIRATION RATE: 16 BRPM | BODY MASS INDEX: 19.16 KG/M2 | DIASTOLIC BLOOD PRESSURE: 67 MMHG | SYSTOLIC BLOOD PRESSURE: 137 MMHG | TEMPERATURE: 98 F | HEIGHT: 65 IN | HEART RATE: 48 BPM | OXYGEN SATURATION: 97 %

## 2019-03-12 DIAGNOSIS — R09.81 NASAL CONGESTION: Primary | ICD-10-CM

## 2019-03-12 DIAGNOSIS — R07.9 CHEST PAIN: ICD-10-CM

## 2019-03-12 DIAGNOSIS — R30.0 DYSURIA: ICD-10-CM

## 2019-03-12 LAB
ALBUMIN SERPL BCP-MCNC: 4.5 G/DL
ALP SERPL-CCNC: 76 U/L
ALT SERPL W/O P-5'-P-CCNC: 13 U/L
ANION GAP SERPL CALC-SCNC: 8 MMOL/L
AST SERPL-CCNC: 15 U/L
B-HCG UR QL: NEGATIVE
BASOPHILS # BLD AUTO: 0.02 K/UL
BASOPHILS NFR BLD: 0.5 %
BILIRUB SERPL-MCNC: 0.3 MG/DL
BILIRUB UR QL STRIP: NEGATIVE
BUN SERPL-MCNC: 8 MG/DL
CALCIUM SERPL-MCNC: 10.4 MG/DL
CHLORIDE SERPL-SCNC: 101 MMOL/L
CLARITY UR REFRACT.AUTO: CLEAR
CO2 SERPL-SCNC: 28 MMOL/L
COLOR UR AUTO: YELLOW
CREAT SERPL-MCNC: 0.8 MG/DL
CTP QC/QA: YES
DIFFERENTIAL METHOD: ABNORMAL
EOSINOPHIL # BLD AUTO: 0.1 K/UL
EOSINOPHIL NFR BLD: 2.7 %
ERYTHROCYTE [DISTWIDTH] IN BLOOD BY AUTOMATED COUNT: 12 %
EST. GFR  (AFRICAN AMERICAN): >60 ML/MIN/1.73 M^2
EST. GFR  (NON AFRICAN AMERICAN): >60 ML/MIN/1.73 M^2
GLUCOSE SERPL-MCNC: 90 MG/DL
GLUCOSE UR QL STRIP: NEGATIVE
HCT VFR BLD AUTO: 38.1 %
HGB BLD-MCNC: 13 G/DL
HGB UR QL STRIP: NEGATIVE
IMM GRANULOCYTES # BLD AUTO: 0.01 K/UL
IMM GRANULOCYTES NFR BLD AUTO: 0.3 %
KETONES UR QL STRIP: NEGATIVE
LEUKOCYTE ESTERASE UR QL STRIP: NEGATIVE
LYMPHOCYTES # BLD AUTO: 1.9 K/UL
LYMPHOCYTES NFR BLD: 50.1 %
MCH RBC QN AUTO: 30.4 PG
MCHC RBC AUTO-ENTMCNC: 34.1 G/DL
MCV RBC AUTO: 89 FL
MONOCYTES # BLD AUTO: 0.3 K/UL
MONOCYTES NFR BLD: 7.2 %
NEUTROPHILS # BLD AUTO: 1.5 K/UL
NEUTROPHILS NFR BLD: 39.2 %
NITRITE UR QL STRIP: NEGATIVE
NRBC BLD-RTO: 0 /100 WBC
PH UR STRIP: 7 [PH] (ref 5–8)
PLATELET # BLD AUTO: 303 K/UL
PMV BLD AUTO: 9.8 FL
POTASSIUM SERPL-SCNC: 4 MMOL/L
PROT SERPL-MCNC: 8.2 G/DL
PROT UR QL STRIP: NEGATIVE
RBC # BLD AUTO: 4.28 M/UL
SODIUM SERPL-SCNC: 137 MMOL/L
SP GR UR STRIP: 1 (ref 1–1.03)
URN SPEC COLLECT METH UR: NORMAL
WBC # BLD AUTO: 3.75 K/UL

## 2019-03-12 PROCEDURE — 99284 EMERGENCY DEPT VISIT MOD MDM: CPT | Mod: ,,, | Performed by: EMERGENCY MEDICINE

## 2019-03-12 PROCEDURE — 80053 COMPREHEN METABOLIC PANEL: CPT

## 2019-03-12 PROCEDURE — 99284 EMERGENCY DEPT VISIT MOD MDM: CPT | Mod: 25

## 2019-03-12 PROCEDURE — 99284 PR EMERGENCY DEPT VISIT,LEVEL IV: ICD-10-PCS | Mod: ,,, | Performed by: EMERGENCY MEDICINE

## 2019-03-12 PROCEDURE — 85025 COMPLETE CBC W/AUTO DIFF WBC: CPT

## 2019-03-12 PROCEDURE — 25000003 PHARM REV CODE 250: Performed by: EMERGENCY MEDICINE

## 2019-03-12 PROCEDURE — 81025 URINE PREGNANCY TEST: CPT | Performed by: EMERGENCY MEDICINE

## 2019-03-12 PROCEDURE — 96360 HYDRATION IV INFUSION INIT: CPT

## 2019-03-12 PROCEDURE — 81003 URINALYSIS AUTO W/O SCOPE: CPT

## 2019-03-12 RX ORDER — AZELASTINE 1 MG/ML
1 SPRAY, METERED NASAL 2 TIMES DAILY
Qty: 30 ML | Refills: 0 | Status: SHIPPED | OUTPATIENT
Start: 2019-03-12 | End: 2019-07-01

## 2019-03-12 RX ORDER — ORPHENADRINE CITRATE 100 MG/1
100 TABLET, EXTENDED RELEASE ORAL 2 TIMES DAILY
COMMUNITY
End: 2019-07-01

## 2019-03-12 RX ORDER — SODIUM CHLORIDE 9 MG/ML
1000 INJECTION, SOLUTION INTRAVENOUS
Status: COMPLETED | OUTPATIENT
Start: 2019-03-12 | End: 2019-03-12

## 2019-03-12 RX ORDER — METHOCARBAMOL 500 MG/1
500 TABLET, FILM COATED ORAL DAILY
COMMUNITY
End: 2019-07-01

## 2019-03-12 RX ADMIN — SODIUM CHLORIDE 1000 ML: 0.9 INJECTION, SOLUTION INTRAVENOUS at 11:03

## 2019-03-12 NOTE — ED TRIAGE NOTES
""it's been about a week or so I've been having all sorts of symptoms - dizziness, ears stuffy, sinus congestion, nauseated, increased anxiety, my mouth feels really dry, I've been sweating a lot. I tried taking dayquil and nyquil but it didn't help. I've been feeling really weak and delirious, and even cut my finger while cutting honey dew melon. My lungs hurt when I breathe in, but just on the right side. I wake up with my hands numb and tingly. I felt some burning with urination a few days ago and took pyridium but it didn't help. Just a whole bunch of things I don't know the cause of. My biggest concern is this lung pain, with my history of cancer and all."  "

## 2019-03-12 NOTE — ED PROVIDER NOTES
"Encounter Date: 3/12/2019       History     Chief Complaint   Patient presents with    multiple complaints     generalized weakness x 1 week. ears feeling blocked. Feeling of delirum. Wheelchair bound. + burning with urination. feeling of dehydration.      38-year-old female with history of sarcoma of the leg s/p amputation, anxiety, depression presents for multiple complaints. Patient reports feeling generally unwell over the past week with sinus and ear fullness, fatigue, generalized weakness, nausea and dysuria.  She reports increased anxiety over the past week and worsening depression over the past month.  States that her Seroquel was recently adjusted by her psychiatrist.  She is also complaining of "lung pain" in her right upper lung over the past 6 months but worsening over the past week.  States pain is made worse with deep breaths and coughing.  She is concerned that she may have metastases to the lung.  She denies recent surgeries or immobilization, not on OCPs, no history of blood clots.  She denies self-harm, SI, HI, hallucinations, delusions, drug or alcohol use.  Denies abdominal pain, palpitations, sick contacts or recent international travel.          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    Anxiety     Bone cancer     Depression      Past Surgical History:   Procedure Laterality Date    BLOCK-LUMBAR-SYMPATHETIC Right 7/9/2015    [facility]    DISARTICULATION-HIP Right 7/10/2015    [facility] 2ND FLR    LEG AMPUTATION AT HIP Left 7/2015     Family History   Problem Relation Age of Onset    Cancer Paternal Uncle         lung    Cancer Maternal Grandmother         breast    Heart attack Maternal Grandmother     Arthritis Maternal Grandmother     Heart attack Maternal " Grandfather     Arthritis Maternal Grandfather     Diabetes Paternal Grandmother     Hypertension Paternal Grandfather     Heart attack Paternal Grandfather     No Known Problems Father      Social History     Tobacco Use    Smoking status: Current Every Day Smoker     Packs/day: 1.00     Types: Cigarettes    Smokeless tobacco: Never Used   Substance Use Topics    Alcohol use: No    Drug use: No     Review of Systems   Constitutional: Positive for diaphoresis, fatigue and fever. Negative for appetite change and chills.   HENT: Positive for congestion and ear pain. Negative for ear discharge, hearing loss, sinus pressure, sinus pain and sore throat.    Respiratory: Negative for cough and shortness of breath.    Cardiovascular: Positive for chest pain. Negative for palpitations and leg swelling.   Gastrointestinal: Positive for constipation and nausea. Negative for abdominal pain, blood in stool, diarrhea and vomiting.   Endocrine: Positive for polyuria. Negative for polydipsia.   Genitourinary: Positive for dysuria and frequency. Negative for difficulty urinating, flank pain, hematuria, pelvic pain, urgency, vaginal bleeding and vaginal discharge.   Musculoskeletal: Negative for arthralgias and myalgias.   Skin: Negative for pallor and wound.   Allergic/Immunologic: Positive for environmental allergies. Negative for immunocompromised state.   Neurological: Positive for light-headedness. Negative for weakness, numbness and headaches.   Psychiatric/Behavioral: Positive for dysphoric mood. Negative for hallucinations, self-injury and suicidal ideas. The patient is nervous/anxious. The patient is not hyperactive.        Physical Exam     Initial Vitals [03/12/19 0915]   BP Pulse Resp Temp SpO2   124/69 71 18 97.6 °F (36.4 °C) 98 %      MAP       --         Physical Exam    Nursing note and vitals reviewed.  Constitutional: She appears well-developed and well-nourished. She is not diaphoretic. No distress.    HENT:   Head: Normocephalic and atraumatic.   Right Ear: Hearing, tympanic membrane, external ear and ear canal normal.   Left Ear: Hearing, tympanic membrane, external ear and ear canal normal.   Nose: Mucosal edema present. Right sinus exhibits no maxillary sinus tenderness and no frontal sinus tenderness. Left sinus exhibits no maxillary sinus tenderness and no frontal sinus tenderness.   Mouth/Throat: Uvula is midline, oropharynx is clear and moist and mucous membranes are normal.   Eyes: EOM are normal. Pupils are equal, round, and reactive to light.   Neck: Normal range of motion. Neck supple.   Cardiovascular: Normal rate, regular rhythm, normal heart sounds and intact distal pulses. Exam reveals no gallop and no friction rub.    No murmur heard.  No lower extremity edema, erythema, tenderness or warmth in left leg.   Pulmonary/Chest: Breath sounds normal. No respiratory distress. She has no wheezes. She has no rhonchi. She has no rales. She exhibits no tenderness.   Abdominal: Soft. Bowel sounds are normal. She exhibits no distension and no mass. There is no tenderness. There is no rebound and no guarding.   Musculoskeletal: Normal range of motion.   Right AKA   Neurological: She is alert and oriented to person, place, and time.   Skin: Skin is warm and dry.   Psychiatric: Her speech is normal and behavior is normal. Thought content normal. Her mood appears anxious. Her affect is not angry, not blunt, not labile and not inappropriate. Thought content is not paranoid and not delusional. She does not exhibit a depressed mood. She expresses no homicidal and no suicidal ideation. She expresses no suicidal plans and no homicidal plans.         ED Course   Procedures  Labs Reviewed   CBC W/ AUTO DIFFERENTIAL - Abnormal; Notable for the following components:       Result Value    WBC 3.75 (*)     Gran # (ANC) 1.5 (*)     Lymph% 50.1 (*)     All other components within normal limits   URINALYSIS, REFLEX TO URINE  "CULTURE    Narrative:     Preferred Collection Type->Urine, Clean Catch   COMPREHENSIVE METABOLIC PANEL   POCT URINE PREGNANCY          Imaging Results          X-Ray Chest PA And Lateral (Final result)  Result time 03/12/19 11:09:07    Final result by Chava Woodson MD (03/12/19 11:09:07)                 Impression:      No acute abnormality.      Electronically signed by: Chava Woodson MD  Date:    03/12/2019  Time:    11:09             Narrative:    EXAMINATION:  XR CHEST PA AND LATERAL    CLINICAL HISTORY:  Chest pain, unspecified    TECHNIQUE:  PA and lateral views of the chest were performed.    COMPARISON:  06/12/2018    FINDINGS:  The lungs are clear, with normal appearance of pulmonary vasculature and no pleural effusion or pneumothorax.    The cardiac silhouette is normal in size. The hilar and mediastinal contours are unremarkable.    Bones are intact.                                 Medical Decision Making:   History:   Old Medical Records: I decided to obtain old medical records.  Clinical Tests:   Lab Tests: Ordered and Reviewed  Radiological Study: Ordered and Reviewed    Additional MDM:   PERC Rule:   Age is greater than or equal to 50 = 0.0  Heart Rate is greater than or equal to 100 = 0.0  SaO2 on room air < 95% = 0.0  Unilateral leg swelling = 0.0  Hemoptysis = 0.0  Recent surgery or trauma = 0.0  Prior PE or DVT =  0.0  Hormone use = 0.00  PERC Score = 0    APC / Resident Notes:   38-year-old female presenting for multiple complaints. Normal vitals with unremarkable exam. Differential diagnosis includes anxiety,  UTI, dehydration, electrolyte derangement. Lower suspicion for pneumonia or lung metastasis. I do not suspect PE give time course of "lung pain", patient is PERC negative. Will check labs, do chest x-ray and re-assess.    Labs unrevealing. Chest x-ray without acute abnormality. I suspect that many of the patient's symptoms are due to anxiety. Given reassuring workup, I do not believe " she requires further ED treatment and is stable for discharge. Stressed the importance of follow-up, strict ED return precautions given.  Patient voiced understanding and is comfortable with discharge. I discussed this patient with my supervising physician.    Diana Jha PA-C                   Clinical Impression:       ICD-10-CM ICD-9-CM   1. Nasal congestion R09.81 478.19   2. Chest pain R07.9 786.50   3. Dysuria R30.0 788.1         Disposition:   Disposition: Discharged  Condition: Stable                        Diana Jha PA-C  03/13/19 2034

## 2019-03-12 NOTE — ED NOTES
"LOC: The patient is awake and alert; oriented x 3 and speaking appropriately.  APPEARANCE: Patient resting comfortably, patient is clean and well groomed.  SKIN: warm and dry, normal skin turgor & moist mucus membranes, skin intact, no breakdown noted. Pt reports recent laceration to L middle finger.  MUSCULOSKELETAL: Patient moving all extremities well, no obvious swelling or deformities noted. Pt reports generalized weakness and fatigue x1 week.  RESPIRATORY: Airway is open and patent; respirations are spontaneous, normal effort and rate. Pt reports pain with inspiration anteriorly and posteriorly over R lung field.  CARDIAC: Patient has a normal rate, no peripheral edema noted, capillary refill < 3 seconds; No complaints of chest pain   ABDOMEN: Soft, tender to palpation above location of bladder, pt reports feeling the need to urinate frequently and painful sensations when bladder is full - pt states "this has been going on for years", no distention noted.   "

## 2019-07-01 ENCOUNTER — OFFICE VISIT (OUTPATIENT)
Dept: OBSTETRICS AND GYNECOLOGY | Facility: CLINIC | Age: 39
End: 2019-07-01
Payer: MEDICAID

## 2019-07-01 VITALS
BODY MASS INDEX: 17.94 KG/M2 | WEIGHT: 107.69 LBS | SYSTOLIC BLOOD PRESSURE: 128 MMHG | DIASTOLIC BLOOD PRESSURE: 80 MMHG | HEIGHT: 65 IN

## 2019-07-01 DIAGNOSIS — Z12.4 SCREENING FOR CERVICAL CANCER: ICD-10-CM

## 2019-07-01 DIAGNOSIS — Z11.3 SCREENING FOR STDS (SEXUALLY TRANSMITTED DISEASES): ICD-10-CM

## 2019-07-01 DIAGNOSIS — N63.0 BREAST LUMP OR MASS: ICD-10-CM

## 2019-07-01 DIAGNOSIS — Z01.419 WELL WOMAN EXAM WITH ROUTINE GYNECOLOGICAL EXAM: Primary | ICD-10-CM

## 2019-07-01 PROCEDURE — 87510 GARDNER VAG DNA DIR PROBE: CPT

## 2019-07-01 PROCEDURE — 99999 PR PBB SHADOW E&M-EST. PATIENT-LVL III: CPT | Mod: PBBFAC,,, | Performed by: OBSTETRICS & GYNECOLOGY

## 2019-07-01 PROCEDURE — 87624 HPV HI-RISK TYP POOLED RSLT: CPT

## 2019-07-01 PROCEDURE — 87491 CHLMYD TRACH DNA AMP PROBE: CPT

## 2019-07-01 PROCEDURE — 99213 OFFICE O/P EST LOW 20 MIN: CPT | Mod: PBBFAC,PO | Performed by: OBSTETRICS & GYNECOLOGY

## 2019-07-01 PROCEDURE — 99395 PREV VISIT EST AGE 18-39: CPT | Mod: S$PBB,,, | Performed by: OBSTETRICS & GYNECOLOGY

## 2019-07-01 PROCEDURE — 88175 CYTOPATH C/V AUTO FLUID REDO: CPT

## 2019-07-01 PROCEDURE — 87086 URINE CULTURE/COLONY COUNT: CPT

## 2019-07-01 PROCEDURE — 99395 PR PREVENTIVE VISIT,EST,18-39: ICD-10-PCS | Mod: S$PBB,,, | Performed by: OBSTETRICS & GYNECOLOGY

## 2019-07-01 PROCEDURE — 99999 PR PBB SHADOW E&M-EST. PATIENT-LVL III: ICD-10-PCS | Mod: PBBFAC,,, | Performed by: OBSTETRICS & GYNECOLOGY

## 2019-07-01 PROCEDURE — 87480 CANDIDA DNA DIR PROBE: CPT

## 2019-07-01 RX ORDER — ONDANSETRON 8 MG/1
8 TABLET, ORALLY DISINTEGRATING ORAL DAILY
Refills: 4 | COMMUNITY
Start: 2019-06-08

## 2019-07-01 RX ORDER — QUETIAPINE FUMARATE 25 MG/1
TABLET, FILM COATED ORAL
Refills: 0 | COMMUNITY
Start: 2019-06-27

## 2019-07-01 RX ORDER — VALACYCLOVIR HYDROCHLORIDE 500 MG/1
TABLET, FILM COATED ORAL
COMMUNITY

## 2019-07-01 RX ORDER — FAMOTIDINE 20 MG/1
20 TABLET, FILM COATED ORAL EVERY MORNING
Refills: 5 | COMMUNITY
Start: 2019-06-07

## 2019-07-01 RX ORDER — SERTRALINE HYDROCHLORIDE 50 MG/1
50 TABLET, FILM COATED ORAL DAILY
Refills: 5 | COMMUNITY
Start: 2019-06-27 | End: 2019-08-12

## 2019-07-01 RX ORDER — SERTRALINE HYDROCHLORIDE 25 MG/1
25 TABLET, FILM COATED ORAL DAILY
Refills: 5 | COMMUNITY
Start: 2019-06-27 | End: 2019-08-12

## 2019-07-01 RX ORDER — GABAPENTIN 300 MG/1
CAPSULE ORAL
COMMUNITY
Start: 2018-12-07

## 2019-07-02 LAB
BACTERIAL VAGINOSIS DNA: POSITIVE
C TRACH DNA SPEC QL NAA+PROBE: NOT DETECTED
CANDIDA GLABRATA DNA: NEGATIVE
CANDIDA KRUSEI DNA: NEGATIVE
CANDIDA RRNA VAG QL PROBE: NEGATIVE
N GONORRHOEA DNA SPEC QL NAA+PROBE: NOT DETECTED
T VAGINALIS RRNA GENITAL QL PROBE: NEGATIVE

## 2019-07-03 LAB — BACTERIA UR CULT: NO GROWTH

## 2019-07-05 ENCOUNTER — TELEPHONE (OUTPATIENT)
Dept: OBSTETRICS AND GYNECOLOGY | Facility: CLINIC | Age: 39
End: 2019-07-05

## 2019-07-05 LAB
HPV HR 12 DNA CVX QL NAA+PROBE: NEGATIVE
HPV16 AG SPEC QL: POSITIVE
HPV18 DNA SPEC QL NAA+PROBE: NEGATIVE

## 2019-07-05 RX ORDER — METRONIDAZOLE 500 MG/1
500 TABLET ORAL EVERY 12 HOURS
Qty: 14 TABLET | Refills: 0 | Status: SHIPPED | OUTPATIENT
Start: 2019-07-05 | End: 2019-07-12

## 2019-07-05 NOTE — TELEPHONE ENCOUNTER
Please let the patient know her cultures show she has bacterial vaginosis.  This is not a sexually transmitted disease.  I have sent in flagyl for her.  Let me know if she has any questions.  Thank you.

## 2019-07-08 NOTE — PROGRESS NOTES
Subjective:       Patient ID: Val Gil is a 38 y.o. female.    Chief Complaint:  Well Woman (discharge and burning and odor)      History of Present Illness  39yo G0 presents for annual exam.  C/o vaginal burning, irritation, and odor.  States she has been treated frequently with Flagyl in the past without relief.  Also c/o lump in right axillary region that has somewhat improved now.  No other complaints today.  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  + Tob use.      GYN & OB History  Patient's last menstrual period was 2019.   Date of Last Pap: 2019    OB History    Para Term  AB Living   0 0 0 0 0 0   SAB TAB Ectopic Multiple Live Births   0 0 0 0         Review of Systems  Review of Systems   Constitutional: Negative for chills, fatigue and fever.   HENT: Negative for nasal congestion and mouth sores.    Eyes: Negative for visual disturbance.   Respiratory: Negative for cough and shortness of breath.    Cardiovascular: Negative for chest pain and palpitations.   Gastrointestinal: Negative for abdominal pain, bloating, constipation, diarrhea, nausea and vomiting.   Genitourinary: Positive for vaginal discharge, vaginal pain and vaginal odor. Negative for dysmenorrhea, dyspareunia, menorrhagia and pelvic pain.   Musculoskeletal: Negative for arthralgias, back pain and myalgias.   Integumentary:  Positive for breast mass. Negative for rash and mole/lesion.   Neurological: Negative for seizures and headaches.   Hematological: Negative for adenopathy. Does not bruise/bleed easily.   Psychiatric/Behavioral: Negative for depression. The patient is not nervous/anxious.    Breast: Positive for mass.Negative for lump and mastodynia          Objective:    Physical Exam:   Constitutional: She is oriented to person, place, and time. She appears well-developed and well-nourished.    HENT:   Head: Normocephalic and atraumatic.    Eyes: Conjunctivae and EOM are normal.    Neck:  "Normal range of motion. Neck supple.    Cardiovascular: Normal rate, regular rhythm and normal heart sounds.     Pulmonary/Chest: Effort normal and breath sounds normal.        Abdominal: Soft. She exhibits no distension and no mass. There is no tenderness. No hernia.     Genitourinary:   Genitourinary Comments: Normal-appearing external female genitalia.  Erythematous vaginal tissue noted.  Small amt thin white discharge noted.  No CMT.  Uterus small, mobile, nontender.  No adnexal masses or tenderness.           Musculoskeletal: Normal range of motion and moves all extremeties.   Right above knee amputation       Neurological: She is alert and oriented to person, place, and time.    Skin: Skin is warm and dry.    Psychiatric: She has a normal mood and affect.   Breast: Symmetric, nontender.  No masses within breasts or axilla.  No lymphadenopathy.  No skin changes.  No nipple discharge.    /80   Ht 5' 5" (1.651 m)   Wt 48.9 kg (107 lb 11.1 oz)   LMP 06/17/2019   BMI 17.92 kg/m²          Assessment:        1. Well woman exam with routine gynecological exam    2. Screening for STDs (sexually transmitted diseases)    3. Screening for cervical cancer    4. Breast lump or mass              Plan:      1.  Routine annual exam with pap smear and breast exam today.  Encourage smoking cessation.  2.  No breast lump on exam today, but will get breast US due to history.    3.  STD screening today.  Will treat empirically with Solosec, as she has failed flagyl in the past.  Cx pending.  4.  Counseling done, precautions given, all questions answered.  RTC 1 year for annual, or prn.        "

## 2019-07-11 ENCOUNTER — TELEPHONE (OUTPATIENT)
Dept: OBSTETRICS AND GYNECOLOGY | Facility: CLINIC | Age: 39
End: 2019-07-11

## 2019-07-11 NOTE — LETTER
July 23, 2019    Val Gil  [address] [place] [id number]             [facility]  [address] [place] [id number]  Phone: [phone] Dear Ms. Gil:    The results of your most recent Pap smear are abnormal. This means that we need to do further testing. We recommend that you come back in to have a colposcopy done as soon as possible.  Please call us at [phone] to schedule testing.    If you have any questions or concerns, please don't hesitate to call.    Sincerely,        Dr. Gregoria Grant

## 2019-07-11 NOTE — TELEPHONE ENCOUNTER
Please call the patient to schedule a colposcopy.  Her pap smear was normal, but she did test positive for the HPV virus.  Please let me know if she has any questions.  Thanks.

## 2019-07-31 ENCOUNTER — TELEPHONE (OUTPATIENT)
Dept: OBSTETRICS AND GYNECOLOGY | Facility: CLINIC | Age: 39
End: 2019-07-31

## 2019-08-12 ENCOUNTER — PROCEDURE VISIT (OUTPATIENT)
Dept: OBSTETRICS AND GYNECOLOGY | Facility: CLINIC | Age: 39
End: 2019-08-12
Payer: MEDICAID

## 2019-08-12 ENCOUNTER — TELEPHONE (OUTPATIENT)
Dept: OBSTETRICS AND GYNECOLOGY | Facility: CLINIC | Age: 39
End: 2019-08-12

## 2019-08-12 VITALS — BODY MASS INDEX: 18.44 KG/M2 | HEIGHT: 65 IN | WEIGHT: 110.69 LBS

## 2019-08-12 DIAGNOSIS — B97.7 HPV (HUMAN PAPILLOMA VIRUS) INFECTION: Primary | ICD-10-CM

## 2019-08-12 LAB
B-HCG UR QL: NEGATIVE
CTP QC/QA: YES

## 2019-08-12 PROCEDURE — 57456 COLPOSCOPY: ICD-10-PCS | Mod: S$PBB,,, | Performed by: OBSTETRICS & GYNECOLOGY

## 2019-08-12 PROCEDURE — 88305 TISSUE EXAM BY PATHOLOGIST: CPT | Performed by: PATHOLOGY

## 2019-08-12 PROCEDURE — 88305 TISSUE EXAM BY PATHOLOGIST: CPT | Mod: 26,,, | Performed by: PATHOLOGY

## 2019-08-12 PROCEDURE — 81025 URINE PREGNANCY TEST: CPT | Mod: PBBFAC,PO | Performed by: OBSTETRICS & GYNECOLOGY

## 2019-08-12 PROCEDURE — 57454 BX/CURETT OF CERVIX W/SCOPE: CPT | Mod: PBBFAC,PO | Performed by: OBSTETRICS & GYNECOLOGY

## 2019-08-12 PROCEDURE — 57456 ENDOCERV CURETTAGE W/SCOPE: CPT | Mod: PBBFAC,PO | Performed by: OBSTETRICS & GYNECOLOGY

## 2019-08-12 PROCEDURE — 88305 TISSUE SPECIMEN TO PATHOLOGY, OBSTETRICS/GYNECOLOGY: ICD-10-PCS | Mod: 26,,, | Performed by: PATHOLOGY

## 2019-08-12 RX ORDER — METRONIDAZOLE 500 MG/1
500 TABLET ORAL EVERY 12 HOURS
Qty: 14 TABLET | Refills: 3 | Status: SHIPPED | OUTPATIENT
Start: 2019-08-12 | End: 2019-08-19

## 2019-08-12 NOTE — TELEPHONE ENCOUNTER
----- Message from Magy Spears sent at 8/12/2019  3:11 PM CDT -----  Contact: Self [phone]  Patient is running 15 minutes late.

## 2019-08-12 NOTE — PROCEDURES
Colposcopy  Date/Time: 8/12/2019 5:06 PM  Performed by: Gregoria rGant MD  Authorized by: Gregoria Grant MD     Consent Done?:  Yes (Written)    Colposcopy Site:  Cervix  Acrowhite Lesion: No    Atypical Vessels: No    Transformation Zone Adequate?: Yes    Biopsy?: No    ECC Performed?: Yes    LEEP Performed?: No     Patient tolerated the procedure well with no immediate complications.   Post-operative instructions were provided for the patient.   Patient was discharged and will follow up if any complications occur     Normal pap, HR HPV 16 positive

## 2022-04-21 ENCOUNTER — TELEPHONE (OUTPATIENT)
Dept: ORTHOPEDICS | Facility: CLINIC | Age: 42
End: 2022-04-21
Payer: MEDICAID

## 2022-04-21 NOTE — TELEPHONE ENCOUNTER
----- Message from Calvin Lyons sent at 4/21/2022 11:28 AM CDT -----  Type:  Patient Returning Call    Who Called:Patient    Who Left Message for Patient:Nurse     Does the patient know what this is regarding?:yes     Would the patient rather a call back or a response via [facility]? Call back     Best Call Back Number:[phone] or [phone]    Additional Information:       we spoke I explained that we go over each & every case Dr Ibrahim lets me know if he sees certain diagnosis, needs certain tests  Etc. We will review her case I have past  surgical notes to go over with him about her   I will call her hopefully Monday with an appt

## 2022-04-25 ENCOUNTER — TELEPHONE (OUTPATIENT)
Dept: ORTHOPEDICS | Facility: CLINIC | Age: 42
End: 2022-04-25
Payer: MEDICAID

## 2022-04-25 NOTE — TELEPHONE ENCOUNTER
----- Message from Gladys Carrillo sent at 4/25/2022  9:04 AM CDT -----  Regarding: Referral  Contact: [phone]  Calling in regards to speaking with nurse Zungia regarding referral to [facility] and records needed to schedule. Please call    We spoke  Per our earlier conversation  She had called [facility] & told them that the resident will call to book an appt   They said she needs a referral... I stopped her there    She needs to give the resident time to reach out to them  We answered our messages Friday after 6 pm   It's only 9: 30 on Monday   He will do it ... I'm positive  He had gotten the message directly from Dr Ibrahim   I asked that she call me Friday IF  She doesn't hear anything by then, then I'm sure  The resident will hear something !

## 2022-04-25 NOTE — TELEPHONE ENCOUNTER
----- Message from Natacha Obando sent at 4/25/2022  4:03 PM CDT -----  Regarding: Pt Called  Name of Who is Calling: GILSON POPE [[id number]]      What is the request in detail: Pt is requesting to speak with nurse, Nadia Robert LPN. Pt Is scheduled for 5/26/22 @ [facility] with Dr. Ibrahim and pt has concerns about it being so long until she sees Dr. Ibrahim because she is in extreme pain. Pt understands that it is preferred per Dr. Ibrahim for her to be seen @ [facility] due to records but pt states that she will bring all of her records to [facility] if that means she can get in sooner. Please advise.       Can the clinic reply by [facility]: NO      What Number to Call Back [facility]: [phone] or [phone](Pt's Mom)

## 2022-04-25 NOTE — TELEPHONE ENCOUNTER
After going over Ms Gonzalez'  case Dr Ibrahim wants to see her at [facility] [place] [place] where all her past records & images are  I gave her their phone number since  Dr Ibrahim had sent a message to his resident there to book her an appt ( just in case)

## 2022-04-29 ENCOUNTER — TELEPHONE (OUTPATIENT)
Dept: ORTHOPEDICS | Facility: CLINIC | Age: 42
End: 2022-04-29
Payer: MEDICAID

## 2022-04-29 NOTE — TELEPHONE ENCOUNTER
----- Message from Maria M Braun RN sent at 4/28/2022  4:45 PM CDT -----  Contact: @[phone]    ----- Message -----  From: Christelle Hernandez  Sent: 4/28/2022   3:55 PM CDT  To: Alexandria Brooks Staff    Pt mother stated she is trying to reach the doctor which he has not seen her daughter at [facility] but has seen him at [facility] so she is calling us to see if we can have someone call in regards to her pain please call @[phone]       Dr Ibrahim & I discussed Ms Gil  This am before clinic Yesterday they called his clinic at [facility] , his earliest appt there is May 26=it's booked ... Ours at [facility]- not until Candace 3 rd...  I called Moms' number -mail box full   I called Val's line & left the message above  Dr Ibrahim said she should keep the [facility] appt  They can set up her surgery then   Either way - Each hospital is full where surgery is concerned ... surgery will be in June  No sooner per Dr Ibrahim

## 2022-05-13 ENCOUNTER — TELEPHONE (OUTPATIENT)
Dept: ORTHOPEDICS | Facility: CLINIC | Age: 42
End: 2022-05-13
Payer: MEDICAID

## 2022-05-13 NOTE — TELEPHONE ENCOUNTER
----- Message from Nitin Guevara sent at 5/13/2022  9:31 AM CDT -----  Contact: [phone]  Pt mom trying to reach Jonah and follow up on her daughter pain. Mom would like a call back.    [phone]

## 2022-05-13 NOTE — TELEPHONE ENCOUNTER
----- Message from Nitin Guevara sent at 5/13/2022  9:31 AM CDT -----  Contact: [phone]  Pt mom trying to reach Jonah and follow up on her daughter pain. Mom would like a call back.    [phone]

## 2022-05-20 ENCOUNTER — TELEPHONE (OUTPATIENT)
Dept: ORTHOPEDICS | Facility: CLINIC | Age: 42
End: 2022-05-20
Payer: MEDICAID

## 2022-05-20 NOTE — TELEPHONE ENCOUNTER
Staff spoke with pt that explained she needed a pre auth from Dr. Ibrahim staff was told to inform her to get intouch with [facility] pt then insisted that staff get a email sent to MsHarshad Nadia who is  nurse staff sent out email and tried to assist pt as much as I can she was ok with staff response and had no further questions     ----- Message from Rosa Maier MA sent at 5/20/2022 10:15 AM CDT -----  Regarding: FW: pt called  Can you please call her and let her know she has to call [facility]. We do not have access to any orders Dr. Ibrahim put in over there.    ----- Message -----  From: Libia Rodriguez  Sent: 5/20/2022  10:06 AM CDT  To: Alexandria Brooks Staff  Subject: pt called                                        Name of Who is Calling: GILSON POPE [[id number]]      What is the request in detail: I sent an earlier message for the pt an she is requesting a callback before noon. Please advise       Can the clinic reply by [facility]: No      What [facility]: [phone]

## 2022-05-23 ENCOUNTER — PATIENT MESSAGE (OUTPATIENT)
Dept: ORTHOPEDICS | Facility: CLINIC | Age: 42
End: 2022-05-23
Payer: MEDICAID

## 2022-07-11 NOTE — PROGRESS NOTES
"[facility]  Psychiatry  Progress Note    Patient Name: Val Gil  MRN: [id number]   Code Status: Full Code  Admission Date: 12/28/2017  Hospital Length of Stay: 6 days  Expected Discharge Date: 1/3/2018  Attending Physician: Bahman Yee MD  Primary Care Provider: [facility]    Current Legal Status: [facility]    Patient information was obtained from patient.     Subjective:     Principal Problem:Suicide attempt by multiple drug overdose    Chief Complaint: SA    HPI: Val Gil is a 37 y.o. female with past psychiatric history of GLORIA, Depression and Adjustment d/o and PMH of Sarcoma s/p R AKA 2015 who was BIB EMS after intentional overdose in suicide attempt.    Per ED MD note:  History gained from EMS and from patient's mother and from a note that was left by the patient at bedside. Mother reports that patient was home during the day and did not know about patient states until she arrived home at 1800 hrs.  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  Additionally the mother reports here that she was on Percocets but that was eventually shifted to [place].  Her mother also the patient began using heroin in the last month approximately. Patient's note said that she was upset about her heroin addiction, that she had some "speed" that she intended to use as a treatment for her heroin addiction.  The noted initially stated that she applied some toxic substance on the day closed blankets sheets to keep staff away from her.  There was a note additionally under her shirt and said DO NOT RESUSCITATE me I want to die.  Bladder pages of her longer noted airbag stated " "that she asked her father to wash the sheets and blankets so that her dog would not be exposed to whatever toxin that she had spread in the room-there was certainly no further information about what this toxic substance was. EMS reported the presence of multiple police rescue at the bedside already concerned rightly for the problem of hazardous exposures from a possible toxin at bedside.  Patient was poorly responsive as noted requiring painful stimuli to get arousal - no verbal response was detected.     Critical care was consulted after patient could not be aroused after initial evaluation in the ED. Per their note, she was given one dose of Narcan in ED, after which she began to awake. She still had slurred speech and would not engage in conversation or follow commands. They "spoke to patient's father who is unfamiliar with patient's medications, however he gave mother's number. When we tried to contact mother, number was disconnected. Also attempted to contact patient's cell phone, but this number was also disconnected. Patient's home meds include amphetamine salt, clonidine, flexaril, cyproheptadine, valium, fentanyl, lamictal, ativan, remeron, zofran, oxycodone, lyrica, seroquil, and zoloft. Review of  shows regular prescriptions for oxycodone 20 mg dispense 360 tablets q 30 days, valium 10 mg dispense 30 tablets q 30 days, lorazepam 1 mg dispense 90 tablets q 30 days, fentanyl patches as well. This goes as far back as 1 calendar year."    On attempt to interview, patient sleeping on ED stretcher, unable to arouse with verbal stimuli or gentle leg shaking. No family present at bedside.    Per chart review, patient previously seen by psychiatry in 2015 for medication management during admission when she received AKA. She was on Lamictal, Remeron, Seroquel, Zoloft at that time; Zoloft was eventually changed to [place]ella. Ativan was used PRN anxiety. Patient did not require inpatient psychiatric hospitalization " "at that time.     Interval update 12/31/17: Pt agitated, shivering, pupils dilated, diaphoretic. Pt c/o opiate withdrawal at this time. She is anxious with irritable mood. Pt states multiple life stressors related to her cancer and AKA that have been building for past few years. Pt and her father were kicked out of their rental house ~1 month ago which was "the straw that broke the camel's back." Pt had been thinking of and planning her suicide for 1 week prior to the attempt. Pt becomes tearful many times during interview, and emotionally states "I didn't want to.. Life was just too much. I'm tired of living in pain." Pt may be unreliable, as she denies heroin use (despite admitting it in suicide note) and does not report accurate dose/frequency of her pain regimen. Pt takes Lamictal 75mg qd, Zoloft 100mg qd, Seroquel 100mg qhs, and Remeron 45mg qhs for her depression. Pt again becomes tearful at conclusion of interview, stating that she is "so tired and burnt out on life." Pt minimizes her suicide attempt and drug use and does not think she needs inpatient psych.    Collateral: mother Dilip Reis, [phone]. Rang once and went to voicemail.    Per psychiatry consult note 7/15/15, information will be updated as able:  Past Psychiatric History:   Previous Psychiatric Hospitalizations: No   Previous Medication Trials: Yes  Previous Suicide Attempts: No   History of Violence: No   Outpatient psychiatrist: Dr. 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  Patient and mother wished to discuss options for Subutex/Suboxone.  Patient was counseled that the addiction team could see her in morning to discuss all available options.  Patient states that she is interested in Subutex outpatient treatment for pain management, and was counseled that Psychiatrists use these medications as tools for opiate use disorder along with addiction treatment, not to treat pain.  Also counseled that Subutex outpatient treatment was unlikely.  Patient feels that pain management specialists are unattainable due to price and is not interested in addiction treatment, but was agreeable to meeting with the Addiction consult team tomorrow morning.  Patient was also dissatisfied that her psychotropic medications were restarted at low doses.  She feels her depression and anxiety are "out of control" and slept poorly last night.    1/2: This morning pt reports she is not doing well because her room is so cold. Pt reports she has had trouble sleeping 2/2 this. She has been unable to take a shower 2/2 hot water not working in her room. Pt is requesting valium and ativan for her anxiety. She says she wants to go to [facility] so she can be treated with Subutex.     1/3: This morning pt reports she is more comfortable as she has been moved to a different room that is much warmer. Pt reports she did not sleep well last night because there was too much noise and people coming in and out of the room. Also complains of psychiatric medications not yet being at home doses. Pt otherwise doing okay this morning. She is planning to go to [facility] or [facility]. Pt telling me she is being discharged today.          Patient " History           Medical as of 1/3/2018     Past Medical History     Diagnosis Date Comments Source    Angiosarcoma -- -- Provider    Angiosarcoma -- -- Provider    Anxiety -- -- Provider    Bone cancer -- -- Provider    Depression -- -- Provider          Pertinent Negatives     Diagnosis Date Noted Comments Source    Asthma 6/29/2015 -- Provider    COPD (chronic obstructive pulmonary disease) 6/29/2015 -- Provider    Diabetes mellitus 1/31/2015 -- Provider    Hypertension 6/29/2015 -- Provider                  Surgical as of 1/3/2018     Past Surgical History     Procedure Laterality Date Comments Source    LEG AMPUTATION AT HIP Left 7/2015 -- Provider                  Family as of 1/3/2018     Problem Relation Name Age of Onset Comments Source    Cancer Paternal Uncle great uncle -- lung Provider    Cancer Maternal Grandmother -- -- breast Provider    Heart attack Maternal Grandmother -- -- -- Provider    Arthritis Maternal Grandmother -- -- -- Provider    Heart attack Maternal Grandfather -- -- -- Provider    Arthritis Maternal Grandfather -- -- -- Provider    Diabetes Paternal Grandmother -- -- -- Provider    Hypertension Paternal Grandfather -- -- -- Provider    Heart attack Paternal Grandfather -- -- -- Provider    No Known Problems Father -- -- -- Provider            Tobacco Use as of 1/3/2018     Smoking Status Smoking Start Date Smoking Quit Date Packs/day Years Used    Former Smoker -- -- -- --    Types Comments Smokeless Tobacco Status Smokeless Tobacco Quit Date Source    -- -- Never Used -- Provider            Alcohol Use as of 1/3/2018     Alcohol Use Drinks/Week Alcohol/Week Comments Source    No -- -- -- Provider            Drug Use as of 1/3/2018     Drug Use Types Frequency Comments Source    No -- -- -- Provider            Sexual Activity as of 1/3/2018     Sexually Active Birth Control Partners Comments Source    Not Asked -- -- -- Provider            Activities of Daily Living as of 1/3/2018     **None**           Social Documentation as of 1/3/2018    **None**           Occupational as of 1/3/2018    **None**           Socioeconomic as of 1/3/2018     Marital Status Spouse Name Number of Children Years Education Preferred Language Ethnicity Race Source    Single -- -- -- English /White White --         Pertinent History Q A Comments    as of 1/3/2018 Lives with      Place in Birth Order      Lives in      Number of Siblings      Raised by      Legal Involvement      Childhood Trauma      Criminal History of      Financial Status      Highest Level of Education      Does patient have access to a firearm?       Service      Primary Leisure Activity      Spirituality       Past Medical History:   Diagnosis Date    Angiosarcoma     Angiosarcoma     Anxiety     Bone cancer     Depression      Past Surgical History:   Procedure Laterality Date    LEG AMPUTATION AT HIP Left 7/2015     Family History     Problem Relation (Age of Onset)    Arthritis Maternal Grandmother, Maternal Grandfather    Cancer Paternal Uncle, Maternal Grandmother    Diabetes Paternal Grandmother    Heart attack Maternal Grandmother, Maternal Grandfather, Paternal Grandfather    Hypertension Paternal Grandfather    No Known Problems Father        Social History Main Topics    Smoking status: Former Smoker    Smokeless tobacco: Never Used    Alcohol use No    Drug use: No    Sexual activity: Not on file     Review of patient's allergies indicates:   Allergen Reactions    Phenergan [promethazine] Anxiety     Pt reports that she can take po phenergan without any reaction.        No current facility-administered medications on file prior to encounter.      Current Outpatient Prescriptions on File Prior to Encounter   Medication Sig    AMPHETAMINE SALT COMBO 15 MG tablet 30 mg.     cloNIDine (CATAPRES) 0.1 MG tablet Take 1 tablet (0.1 mg total) by mouth 3 (three) times daily as needed (withdrawal).     cyclobenzaprine (FLEXERIL) 10 MG tablet     cyproheptadine (PERIACTIN) 4 mg tablet     diazepam (VALIUM) 5 MG tablet 10 mg.     fentanyl (DURAGESIC) 12 mcg/hr PT72     fentaNYL (DURAGESIC) 25 mcg/hr     lamotrigine (LAMICTAL) 25 MG tablet Take 2 tablets (50 mg total) by mouth once daily.    lorazepam (ATIVAN) 0.5 MG tablet Take 0.5 mg by mouth 2 (two) times daily.     lorazepam (ATIVAN) 1 MG tablet     mirtazapine (REMERON) 45 MG tablet Take 1 tablet (45 mg total) by mouth every evening.    ondansetron (ZOFRAN) 8 MG tablet Take 1 tablet (8 mg total) by mouth every 6 (six) hours as needed.    oxycodone (ROXICODONE) 10 mg Tab immediate release tablet Take 1-2 tablets (10-20 mg total) by mouth every 6 (six) hours as needed for Pain.    oxycodone (ROXICODONE) 5 MG immediate release tablet Take 2 tablets (10 mg total) by mouth every 6 (six) hours as needed for Pain.    pregabalin (LYRICA) 50 MG capsule Take 1 capsule (50 mg total) by mouth once daily.    promethazine (PHENERGAN) 25 MG tablet     quetiapine (SEROQUEL) 100 MG Tab Take 1 tablet (100 mg total) by mouth every evening.    senna-docusate 8.6-50 mg (PERICOLACE) 8.6-50 mg per tablet Take 2 tablets by mouth 2 (two) times daily.    sertraline (ZOLOFT) 50 MG tablet     sucralfate (CARAFATE) 1 gram tablet Take 1 g by mouth 4 (four) times daily.     Psychotherapeutics     Start     Stop Route Frequency Ordered    01/03/18 2100  sertraline tablet 50 mg      -- Oral Nightly 01/03/18 1031    01/01/18 2100  mirtazapine tablet 30 mg      -- Oral Nightly 01/01/18 1904    12/31/17 2100  QUEtiapine tablet 100 mg      -- Oral Nightly 12/31/17 1538    12/30/17 2207  LORazepam tablet 2 mg      -- Oral Every 6 hours PRN 12/30/17 2107        Review of Systems    Strengths and Liabilities: Strength: Patient is expressive/articulate., Liability: Patient is defensive., Liability: Patient lacks coping skills.    Objective:     Vital Signs (Most Recent):  Temp: 98.1 °F  "(36.7 °C) (01/03/18 0805)  Pulse: 70 (01/03/18 0805)  Resp: 18 (01/03/18 0805)  BP: 130/77 (01/03/18 0805)  SpO2: 98 % (01/03/18 0805) Vital Signs (24h Range):  Temp:  [97.6 °F (36.4 °C)-98.4 °F (36.9 °C)] 98.1 °F (36.7 °C)  Pulse:  [] 70  Resp:  [18] 18  SpO2:  [96 %-99 %] 98 %  BP: (121-147)/(77-95) 130/77     Height: 5' 4" (162.6 cm)  Weight: 63.5 kg (139 lb 15.9 oz)  Body mass index is 24.03 kg/m².    No intake or output data in the 24 hours ending 01/03/18 1033    Physical Exam     Appearance: AKA, fair grooming/hygeine wearing hospital gown in NAD; diaphoretic, dilated pupils, tremulous  Behavior: somewhat agitated, defensive, minimizing  Speech: normal rate, tone, and volume  Mood: depressed  Affect: anxious  Thought Process: linear  Thought Perceptions: denied AVH  Thought Content: denied SI, HI; no delusions apparent  Sensorium: awake, alert  Attention/Concentration: intact to conversation  Orientation: person, place, time, and situation  Memory: intact (recent, remote)  Abstraction: intact   Insight: poor  Judgment: good         Significant Labs:   Recent Results (from the past 48 hour(s))   Acetaminophen level    Collection Time: 01/01/18  4:45 PM   Result Value Ref Range    Acetaminophen (Tylenol), Serum <3.0 (L) 10.0 - 20.0 ug/mL   Comprehensive metabolic panel    Collection Time: 01/02/18  4:19 AM   Result Value Ref Range    Sodium 139 136 - 145 mmol/L    Potassium 3.3 (L) 3.5 - 5.1 mmol/L    Chloride 106 95 - 110 mmol/L    CO2 25 23 - 29 mmol/L    Glucose 87 70 - 110 mg/dL    BUN, Bld 12 6 - 20 mg/dL    Creatinine 0.7 0.5 - 1.4 mg/dL    Calcium 9.5 8.7 - 10.5 mg/dL    Total Protein 7.2 6.0 - 8.4 g/dL    Albumin 3.6 3.5 - 5.2 g/dL    Total Bilirubin 0.3 0.1 - 1.0 mg/dL    Alkaline Phosphatase 105 55 - 135 U/L    AST 33 10 - 40 U/L    ALT 37 10 - 44 U/L    Anion Gap 8 8 - 16 mmol/L    eGFR if African American >60.0 >60 mL/min/1.73 m^2    eGFR if non African American >60.0 >60 mL/min/1.73 m^2 "   Magnesium    Collection Time: 01/02/18  4:19 AM   Result Value Ref Range    Magnesium 2.2 1.6 - 2.6 mg/dL   Phosphorus    Collection Time: 01/02/18  4:19 AM   Result Value Ref Range    Phosphorus 3.2 2.7 - 4.5 mg/dL   CBC auto differential    Collection Time: 01/02/18  4:19 AM   Result Value Ref Range    WBC 7.16 3.90 - 12.70 K/uL    RBC 4.00 4.00 - 5.40 M/uL    Hemoglobin 11.9 (L) 12.0 - 16.0 g/dL    Hematocrit 34.1 (L) 37.0 - 48.5 %    MCV 85 82 - 98 fL    MCH 29.8 27.0 - 31.0 pg    MCHC 34.9 32.0 - 36.0 g/dL    RDW 11.9 11.5 - 14.5 %    Platelets 324 150 - 350 K/uL    MPV 10.1 9.2 - 12.9 fL    Immature Granulocytes 1.3 (H) 0.0 - 0.5 %    Gran # 4.9 1.8 - 7.7 K/uL    Immature Grans (Abs) 0.09 (H) 0.00 - 0.04 K/uL    Lymph # 1.4 1.0 - 4.8 K/uL    Mono # 0.6 0.3 - 1.0 K/uL    Eos # 0.1 0.0 - 0.5 K/uL    Baso # 0.03 0.00 - 0.20 K/uL    nRBC 0 0 /100 WBC    Gran% 68.5 38.0 - 73.0 %    Lymph% 20.0 18.0 - 48.0 %    Mono% 8.4 4.0 - 15.0 %    Eosinophil% 1.4 0.0 - 8.0 %    Basophil% 0.4 0.0 - 1.9 %    Differential Method Automated    Acetaminophen level    Collection Time: 01/02/18  4:19 AM   Result Value Ref Range    Acetaminophen (Tylenol), Serum <3.0 (L) 10.0 - 20.0 ug/mL   Protime-INR    Collection Time: 01/02/18  4:19 AM   Result Value Ref Range    Prothrombin Time 11.3 9.0 - 12.5 sec    INR 1.1 0.8 - 1.2   Tricyclic Screen, Serum    Collection Time: 01/02/18  4:19 AM   Result Value Ref Range    TCA Scrn 211.5 See Text ng/mL   Comprehensive metabolic panel    Collection Time: 01/03/18  6:22 AM   Result Value Ref Range    Sodium 142 136 - 145 mmol/L    Potassium 3.3 (L) 3.5 - 5.1 mmol/L    Chloride 107 95 - 110 mmol/L    CO2 25 23 - 29 mmol/L    Glucose 94 70 - 110 mg/dL    BUN, Bld 12 6 - 20 mg/dL    Creatinine 0.7 0.5 - 1.4 mg/dL    Calcium 9.7 8.7 - 10.5 mg/dL    Total Protein 7.3 6.0 - 8.4 g/dL    Albumin 3.6 3.5 - 5.2 g/dL    Total Bilirubin 0.2 0.1 - 1.0 mg/dL    Alkaline Phosphatase 111 55 - 135 U/L    AST 27  10 - 40 U/L    ALT 45 (H) 10 - 44 U/L    Anion Gap 10 8 - 16 mmol/L    eGFR if African American >60.0 >60 mL/min/1.73 m^2    eGFR if non African American >60.0 >60 mL/min/1.73 m^2   Magnesium    Collection Time: 01/03/18  6:22 AM   Result Value Ref Range    Magnesium 2.0 1.6 - 2.6 mg/dL   Phosphorus    Collection Time: 01/03/18  6:22 AM   Result Value Ref Range    Phosphorus 3.1 2.7 - 4.5 mg/dL   CBC auto differential    Collection Time: 01/03/18  6:22 AM   Result Value Ref Range    WBC 5.58 3.90 - 12.70 K/uL    RBC 4.01 4.00 - 5.40 M/uL    Hemoglobin 11.9 (L) 12.0 - 16.0 g/dL    Hematocrit 33.9 (L) 37.0 - 48.5 %    MCV 85 82 - 98 fL    MCH 29.7 27.0 - 31.0 pg    MCHC 35.1 32.0 - 36.0 g/dL    RDW 11.9 11.5 - 14.5 %    Platelets 321 150 - 350 K/uL    MPV 9.6 9.2 - 12.9 fL    Immature Granulocytes 1.6 (H) 0.0 - 0.5 %    Gran # 3.9 1.8 - 7.7 K/uL    Immature Grans (Abs) 0.09 (H) 0.00 - 0.04 K/uL    Lymph # 0.9 (L) 1.0 - 4.8 K/uL    Mono # 0.7 0.3 - 1.0 K/uL    Eos # 0.0 0.0 - 0.5 K/uL    Baso # 0.02 0.00 - 0.20 K/uL    nRBC 0 0 /100 WBC    Gran% 69.9 38.0 - 73.0 %    Lymph% 15.2 (L) 18.0 - 48.0 %    Mono% 12.4 4.0 - 15.0 %    Eosinophil% 0.5 0.0 - 8.0 %    Basophil% 0.4 0.0 - 1.9 %    Differential Method Automated    Protime-INR    Collection Time: 01/03/18  6:22 AM   Result Value Ref Range    Prothrombin Time 11.5 9.0 - 12.5 sec    INR 1.1 0.8 - 1.2   Tricyclic Screen, Serum    Collection Time: 01/03/18  6:22 AM   Result Value Ref Range    TCA Scrn 185.1 See Text ng/mL      No results found for: PHENYTOIN, PHENOBARB, VALPROATE, CBMZ      Significant Imaging: I have reviewed all pertinent imaging results/findings within the past 24 hours.    Assessment/Plan:     * Suicide attempt by multiple drug overdose    - Agree with P[facility] in setting of deliberate suicide attempt, will seek inpatient placement once medically cleared, currently being admitted from ED to ICU. At this time concern for patient developing possible  serotonin syndrome, NMS, tylenol toxicity and TCA toxicity. Overdose on Seroquel also likely given prolonged Qtc (since ED arrival 453 -> 497).  - Per her suicide note recent use of heroin and methamphetamines, UDS + opiates and benzos  - Home regimen per ICU team amphetamine salts, clonidine, flexaril, cyproheptadine, valium, fentanyl, lamictal, ativan, remeron, zofran, oxycodone, lyrica, seroquel, and zoloft.   - Per primary team: Review of  shows regular prescriptions for oxycodone 20 mg dispense 360 tablets q 30 days, valium 10 mg dispense 30 tablets q 30 days, lorazepam 1 mg dispense 90 tablets q 30 days, fentanyl patches as well. This goes as far back as 1 calendar year.  - Continue home psychotropic medications, increase Remeron to 45 mg po qhs for mood and sleep, and to gain therapeutic alliance with the patient.   - May use Ativan 2mg PO or IM q6 hours PRN severe anxiety; would avoid using antipsychotics     Plan:   - Addiction psych to meet with pt today.   - Continue [facility] for danger to self and transfer to inpatient psych once medically cleared  - Please give Zoloft QHS                 Need for Continued Hospitalization:   Psychiatric illness continues to pose a potential threat to life or bodily function, of self or others, thereby requiring the need for continued inpatient psychiatric hospitalization.    Anticipated Disposition: Psychiatric Hospital     Total time:  25 with greater than 50% of this time spent in counseling and/or coordination of care.       Arnaldo Gerber MD   Psychiatry  [facility]   The patient is a 36y Female complaining of pelvic pain.

## 2023-03-23 DIAGNOSIS — S54.02XA: Primary | ICD-10-CM

## 2023-06-22 ENCOUNTER — TELEPHONE (OUTPATIENT)
Dept: HOME HEALTH SERVICES | Facility: CLINIC | Age: 43
End: 2023-06-22
Payer: MEDICAID

## 2023-06-22 ENCOUNTER — PES CALL (OUTPATIENT)
Dept: ADMINISTRATIVE | Facility: CLINIC | Age: 43
End: 2023-06-22
Payer: MEDICAID

## 2023-06-22 DIAGNOSIS — G89.3 CANCER ASSOCIATED PAIN: ICD-10-CM

## 2023-06-22 DIAGNOSIS — G54.7 PHANTOM LIMB SYNDROME: Primary | ICD-10-CM

## 2023-06-28 ENCOUNTER — TELEPHONE (OUTPATIENT)
Dept: PRIMARY CARE CLINIC | Facility: CLINIC | Age: 43
End: 2023-06-28
Payer: MEDICAID

## 2023-06-28 ENCOUNTER — CARE AT HOME (OUTPATIENT)
Dept: HOME HEALTH SERVICES | Facility: CLINIC | Age: 43
End: 2023-06-28
Payer: MEDICAID

## 2023-06-28 DIAGNOSIS — F13.20 BENZODIAZEPINE DEPENDENCE: ICD-10-CM

## 2023-06-28 DIAGNOSIS — Z51.5 PALLIATIVE CARE ENCOUNTER: Primary | ICD-10-CM

## 2023-06-28 DIAGNOSIS — F41.9 ANXIETY: ICD-10-CM

## 2023-06-28 DIAGNOSIS — G89.3 CANCER ASSOCIATED PAIN: ICD-10-CM

## 2023-06-28 DIAGNOSIS — G54.7 PHANTOM LIMB SYNDROME: ICD-10-CM

## 2023-06-28 PROCEDURE — 99497 ADVNCD CARE PLAN 30 MIN: CPT | Mod: S$GLB,,, | Performed by: NURSE PRACTITIONER

## 2023-06-28 PROCEDURE — 99350 HOME/RES VST EST HIGH MDM 60: CPT | Mod: S$GLB,,, | Performed by: NURSE PRACTITIONER

## 2023-06-28 PROCEDURE — 3079F DIAST BP 80-89 MM HG: CPT | Mod: CPTII,S$GLB,, | Performed by: NURSE PRACTITIONER

## 2023-06-28 PROCEDURE — 3074F SYST BP LT 130 MM HG: CPT | Mod: CPTII,S$GLB,, | Performed by: NURSE PRACTITIONER

## 2023-06-28 RX ORDER — LORAZEPAM 1 MG/1
1 TABLET ORAL 3 TIMES DAILY PRN
Refills: 0 | Status: CANCELLED
Start: 2023-06-28

## 2023-06-28 NOTE — PROGRESS NOTES
[facility]  Palliative Care Home Visit    Visit Date:  6/28/2023  Encounter Provider:  Suzette Leija DNP, FNP-c  PCP:  [facility]    Subjective:      Patient ID: Val Gil is a 43 y.o. female.    Consult Requested By:  Magy Madrid  Reason for Consult:  Palliative Care      Chief Complaint: Establish Palliative Care     Outpatient Palliative Care Encounter:    Ms. Gil is being seen at home due to being seen at home due to physical debility that presents a taxing effort to leave the home, to mitigate high risk of hospital readmission and/or due to the limited availability of reliable or safe options for transportation to the point of access to the provider. Prior to treatment on this visit the chart was reviewed and patient verbal consent was obtained.      Social History:    Ms. Gil is not  and has no children. Lives with her mother. She has 1 brother who lives out of [facility] and he moves around. Family is estranged from him. Patient's father is living and he lives in [place]. Father helps at times but not fincancially. Parents are . She did not serve in the . 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 She is currently on disability and only gets $500/month.       Impression:    Ms. Gil was seen today@home to establish palliative care services. States she was referred by Ramón Borges with Medicaid. She lives at home wit her mother and she requires max assistance with ADLs. She transfers via wheelchair. She is sitting up in bed in her bedroom and she is AA&Ox2-3 person, place. He expresses needing pain medications and antianxiety medications. She did see pain medicine Physician in [place] for pain management and no longer see this physician. Reports she needs oxycodone because it is the only medication that has worked>>then she was on Fentyl patch. On 7/2015 she had right leg removed from socket and has increased pain.  States she has not bathed in over a month. Left foot often feels like it will snap off with standing. Referral was already placed for podiatry, hand doctor and orthopedics.     PCP Jammie Roy M.D.  Orthopedic sx eva rebecca    Note:    Explained to pt unable to Rx narcotics for chronic pain and she will need to f/u with PCP or pain management. She is upset because expresses she cannot find anyone to give her oxycodone    Review of Systems   Constitutional:  Positive for activity change (not getting out much), appetite change (decreased;fluctuates), fatigue and unexpected weight change.   HENT:  Negative for sore throat and trouble swallowing.    Respiratory:  Positive for shortness of breath. Negative for cough and chest tightness.    Cardiovascular:  Negative for chest pain and leg swelling.   Gastrointestinal:  Negative for abdominal pain, constipation, diarrhea and nausea.   Genitourinary:  Positive for hematuria. Negative for difficulty urinating.   Musculoskeletal:  Positive for arthralgias, back pain, gait problem, joint swelling and myalgias.        +left arm pain  Reports hoping on one leg  +right hip pain; right leg amputation   Skin:  Negative for wound.   Neurological:  Positive for weakness. Negative for dizziness, light-headedness, numbness and headaches.   Psychiatric/Behavioral:  Positive for agitation and sleep disturbance. The patient is nervous/anxious.         +panic attacks       Goals of Care:    I initiated the process of advance care planning today and explained the importance of this process to the patient and family.  I introduced the concept of advance directives to the patient, as well. Then the patient received detailed information about the importance of designating a Health Care Power of  (HCPOA). She was also instructed to communicate with this person about his/her wishes for future healthcare, should she/he become sick and lose decision-making capacity.    I Introduced  [place]POST form with patient/family, explaining this is the patient's wishes, and this form will be uploaded into the patient's [facility] and the [place] Registry.      6/28/2023 Discussed goals of care would like the pain to be under control.         PLAN:  1. Follow-up with PCP or pain management  2. Continue all medications  3. F/u with PCP as needed  4. 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    Anxiety     Bone cancer     Depression      Family History   Problem Relation Age of Onset    Cancer Paternal Uncle         lung    Cancer Maternal Grandmother         breast    Heart attack Maternal Grandmother     Arthritis Maternal Grandmother     Heart attack Maternal Grandfather     Arthritis Maternal Grandfather     Diabetes Paternal Grandmother     Hypertension Paternal Grandfather     Heart attack Paternal Grandfather     No Known Problems Father      Past Surgical History:   Procedure Laterality Date    LEG AMPUTATION AT HIP Left 7/2015     Review of patient's allergies indicates:  No Known Allergies    Medications:    Current Outpatient Medications:     buprenorphine HCl (SUBUTEX) 2 mg Subl, TAKE one TABLET UNDER TONGUE, takes 8 mg TID, Disp: , Rfl: 0    diazePAM (VALIUM) 10 MG Tab, TAKE 1 TABLET BY MOUTH DAILY AS NEEDED FOR ANXIETY, Disp: , Rfl: 0    famotidine (PEPCID) 20 MG tablet, Take 20 mg by mouth every morning., Disp: , Rfl: 5    gabapentin (NEURONTIN) 300 MG capsule, gabapentin 300 mg capsule  Take 1 capsule 3 times a day by  oral route., Disp: , Rfl:     lamoTRIgine (LAMICTAL) 25 MG tablet, TAKE THREE TABLET BY MOUTH EVERY MORNING, Disp: , Rfl: 0    LORazepam (ATIVAN) 1 MG tablet, Take 1 tablet (1 mg total) by mouth 3 (three) times daily as needed for Anxiety., Disp: , Rfl: 0    ondansetron (ZOFRAN-ODT) 8 MG TbDL, Take 8 mg by mouth once daily., Disp: , Rfl: 4    promethazine (PHENERGAN) 25 MG tablet, Take 25 mg by mouth daily as needed for Nausea. , Disp: , Rfl: 1    QUEtiapine (SEROQUEL) 100 MG Tab, one AT bedtime takes 200 mg at night, Disp: , Rfl: 0    QUEtiapine (SEROQUEL) 25 MG Tab, TAKE 2-3 BY MOUTH AS NEEDED AT NIGHT, Disp: , Rfl: 0    valACYclovir (VALTREX) 500 MG tablet, Valtrex 500 mg tablet  Take 1 tablet twice a day by oral route for 5 days., Disp: , Rfl:     24h Oral Morphine Equivalents (OME):  n/a    Objective:     Physical Exam:  Vitals:    06/28/23 1604   BP: 120/80   Pulse: 80   Resp: 18   Temp: 97.8 °F (36.6 °C)   TempSrc: Temporal   SpO2: 96%   PainSc:   8   PainLoc: Arm     There is no height or weight on file to calculate BMI.    Physical Exam  Vitals and nursing note reviewed.   Constitutional:       Appearance: Normal appearance.   HENT:      Head: Normocephalic and atraumatic.      Nose: Rhinorrhea present.      Mouth/Throat:      Mouth: Mucous membranes are moist.   Eyes:      Extraocular Movements: Extraocular movements intact.      Conjunctiva/sclera: Conjunctivae normal.   Cardiovascular:      Rate and Rhythm: Normal rate and regular rhythm.      Pulses: Normal pulses.      Heart sounds: No murmur heard.  Pulmonary:      Effort: Pulmonary effort is normal.      Breath sounds: Normal breath sounds.   Abdominal:      General: Bowel sounds are normal.      Palpations: Abdomen is soft.   Genitourinary:     Comments: Incontinent of bowel and bladder  Musculoskeletal:         General: No swelling. Normal range of motion.      Cervical back: Normal range of motion and neck supple.      Right lower leg: No edema.       Left lower leg: No edema.      Comments: No right leg   Skin:     General: Skin is warm and dry.      Capillary Refill: Capillary refill takes less than 2 seconds.   Neurological:      Mental Status: She is alert and oriented to person, place, and time.      Motor: Weakness present.      Gait: Gait abnormal.   Psychiatric:         Mood and Affect: Mood normal.         Behavior: Behavior normal.      Comments: crying       Review of Symptoms      Symptom Assessment (ESAS 0-10 Scale)  Pain:  8  Dyspnea:  3  Anxiety:  8  Nausea:  0  Depression:  8  Anorexia:  0  Fatigue:  5  Insomnia:  5  Restlessness:  9  Agitation:  9     CAM / Delirium:  Negative  Constipation:  Negative  Diarrhea:  Negative    Anxiety:  Is nervous/anxious  Constipation:  No constipation    Bowel Management Plan (BMP):  Yes      Comments:  LBM 1 week ago    Pain Assessment:    Location(s): other (right hip)      Performance Status:  50    Karnofsky Performance Scale:  50%    Living Arrangements:  Lives with family    Psychosocial/Cultural:   See Palliative Psychosocial Note: Yes  **Primary  to Follow**  Palliative Care  Consult: No    Spiritual:  C - Community:  Does not go to [employer]  A - Address in Care:  No spiritual needs identified  Other       Location (Other): right hip pain       Location: right       Quality: aching        Quantity: 8/10 in intensity        Chronicity: Onset 8 year(s) ago, stable        Aggravating Factors: activity        Alleviating Factors: opiates        Associated Symptoms: none      Advance Care Planning   Advance Directives:   Living Will: No        Oral Declaration: No    [place]POST: No    Do Not Resuscitate Status: No    Medical Power of : No        Oral Declaration: No    Agent's Name:  Dilip Reis (mother)   Agent's Contact Number:  [phone] cell    Decision Making:  Patient answered questions and Family answered questions  Goals of Care: The patient and family endorses  that what is most important right now is to focus on spending time at home, avoiding the hospital, remaining as independent as possible, symptom/pain control, and comfort and QOL     Accordingly, we have decided that the best plan to meet the patient's goals includes continuing with treatment         Labs:  CBC:   WBC   Date Value Ref Range Status   11/29/2021 5.9 4.5 - 11.0 103/uL Final   03/12/2019 3.75 (L) 3.90 - 12.70 K/uL Final         Hemoglobin   Date Value Ref Range Status   11/29/2021 12.1 12.0 - 16.0 gm/dL Final   03/12/2019 13.0 12.0 - 16.0 g/dL Final         POC Hematocrit   Date Value Ref Range Status   07/10/2015 29 (L) 36 - 54 %PCV Final     Hematocrit   Date Value Ref Range Status   11/29/2021 35.7 35.0 - 46.0 % Final   03/12/2019 38.1 37.0 - 48.5 % Final         MCV   Date Value Ref Range Status   11/29/2021 89.1 80.0 - 100.0 fL Final   03/12/2019 89 82 - 98 fL Final         Platelets   Date Value Ref Range Status   03/12/2019 303 150 - 350 K/uL Final       LFT:   Lab Results   Component Value Date    AST 15 11/29/2021    ALKPHOS 76 03/12/2019    BILITOT 0.4 11/29/2021       Albumin:   Albumin   Date Value Ref Range Status   11/29/2021 4.4 3.4 - 5.0 g/dL Final   03/12/2019 4.5 3.5 - 5.2 g/dL Final     Protein:   Total Protein   Date Value Ref Range Status   03/12/2019 8.2 6.0 - 8.4 g/dL Final       Radiology:  I have reviewed all pertinent imaging results/findings within the past 24 hours.      Assessment:     1. Palliative care encounter    2. Phantom limb syndrome    3. Cancer associated pain    4. Anxiety    5. Benzodiazepine dependence        Plan:   1. Palliative care encounter  F/u with pain management  -f/u with PCP  Reviewed LAPOST with pt  -full code     2. Phantom limb syndrome  -     [facility]    3. Cancer associated pain  -     Ambulatory referral/consult to [facility]    4. Anxiety  F/u  with PCP    5. Benzodiazepine dependence  -f/u with PCP or pain managment         Were controlled substances prescribed?  No    Total clinical care time was 60min. Reviewed Chart and PCP's notes thenThe following issues were discussed: discussed panic attacks, pain issues, f/u with PCP and pain management. NP unable to prescribe narcotics for chronic pain.PMHx, PSHx, social history. LapOST    An additional 30 minutes of the visit was spent in advanced care planning.  Explained to patient what palliative care is and palliative care vs home health and hospice. Reviewed [place]POST and AD with patient. Discussed goals of care        Follow Up Appointments:   No future appointments.    Patient and family agreed via verbal consent to access medical records and for assessment and treatment during this visit.    Attestation: Screening criteria to assess the level of the patient's risk for infection with COVID-19 as recommended by the CDC at the time of the above documented home visit concluded appropriateness to proceed.     Universal precautions were maintained at all times, including provider use of >60% alcohol gel hand  immediately prior to entry and upon departing the patient's home as well as cleaning of equipment used in home visit with antibacterial/germicidal disposable wipes.    Patient has not been exposed to anyone with the virus (to the patient's knowledge)  Patient has not been out of the country in the last 14 days.    NP Nurse wore face mask entire length of visit    Suzette Leija DNP, FNP-C  [email or web address]  [phone]

## 2023-06-28 NOTE — PATIENT INSTRUCTIONS
FOLLOW-UP INSTRUCTIONS:    Follow-up with PCP  Continue all medications, treatments, and therapies as ordered  Fall precautions at all times  Maintain Safety precautions at all times  Attend all medical appointments as scheduled  F/u with PCP as needed  Patient to have 6 feet between each other  In an Emergency, call 911 or go to ED; notify PCP's office  9. Limit Risks of environmental exposure to coronavirus as discussed including: social distancing, hand hygiene, and limiting departures from the home for necessities only.   10. Referral to [facility] palliative care  11. Reviewed Ingrid

## 2023-07-11 ENCOUNTER — TELEPHONE (OUTPATIENT)
Dept: PALLIATIVE MEDICINE | Facility: CLINIC | Age: 43
End: 2023-07-11
Payer: MEDICAID

## 2023-07-11 NOTE — TELEPHONE ENCOUNTER
Spoke with patients mother and informed her that Ms Jeffery's appointment has been cancelled due to being scheduled incorrectly

## 2024-02-23 VITALS
OXYGEN SATURATION: 96 % | TEMPERATURE: 98 F | HEART RATE: 80 BPM | RESPIRATION RATE: 18 BRPM | SYSTOLIC BLOOD PRESSURE: 120 MMHG | DIASTOLIC BLOOD PRESSURE: 80 MMHG

## 2025-06-13 ENCOUNTER — HOSPITAL ENCOUNTER (EMERGENCY)
Facility: HOSPITAL | Age: 45
Discharge: PSYCHIATRIC OR SUBSTANCE USE FACILITY | End: 2025-06-13
Attending: EMERGENCY MEDICINE
Payer: MEDICAID

## 2025-06-13 VITALS
OXYGEN SATURATION: 98 % | HEART RATE: 96 BPM | SYSTOLIC BLOOD PRESSURE: 143 MMHG | BODY MASS INDEX: 18.33 KG/M2 | TEMPERATURE: 99 F | DIASTOLIC BLOOD PRESSURE: 90 MMHG | WEIGHT: 110 LBS | RESPIRATION RATE: 20 BRPM | HEIGHT: 65 IN

## 2025-06-13 DIAGNOSIS — F29 PSYCHOSIS, UNSPECIFIED PSYCHOSIS TYPE: Primary | ICD-10-CM

## 2025-06-13 LAB
ABSOLUTE EOSINOPHIL (OHS): 0 K/UL
ABSOLUTE MONOCYTE (OHS): 0.44 K/UL (ref 0.3–1)
ABSOLUTE NEUTROPHIL COUNT (OHS): 6.77 K/UL (ref 1.8–7.7)
ALBUMIN SERPL BCP-MCNC: 4.6 G/DL (ref 3.5–5.2)
ALP SERPL-CCNC: 72 UNIT/L (ref 40–150)
ALT SERPL W/O P-5'-P-CCNC: 9 UNIT/L (ref 10–44)
AMPHET UR QL SCN: NEGATIVE
ANION GAP (OHS): 12 MMOL/L (ref 8–16)
ANION GAP (OHS): 17 MMOL/L (ref 8–16)
APAP SERPL-MCNC: <3 UG/ML (ref 10–20)
AST SERPL-CCNC: 16 UNIT/L (ref 11–45)
B-HCG UR QL: NEGATIVE
BACTERIA #/AREA URNS AUTO: ABNORMAL /HPF
BARBITURATE SCN PRESENT UR: NEGATIVE
BASOPHILS # BLD AUTO: 0.01 K/UL
BASOPHILS NFR BLD AUTO: 0.1 %
BENZODIAZ UR QL SCN: ABNORMAL
BILIRUB SERPL-MCNC: 0.5 MG/DL (ref 0.1–1)
BILIRUB UR QL STRIP.AUTO: NEGATIVE
BUN SERPL-MCNC: 17 MG/DL (ref 6–20)
BUN SERPL-MCNC: 24 MG/DL (ref 6–20)
CALCIUM SERPL-MCNC: 8.6 MG/DL (ref 8.7–10.5)
CALCIUM SERPL-MCNC: 8.7 MG/DL (ref 8.7–10.5)
CANNABINOIDS UR QL SCN: ABNORMAL
CHLORIDE SERPL-SCNC: 105 MMOL/L (ref 95–110)
CHLORIDE SERPL-SCNC: 108 MMOL/L (ref 95–110)
CK SERPL-CCNC: 89 U/L (ref 20–180)
CLARITY UR: CLEAR
CO2 SERPL-SCNC: 15 MMOL/L (ref 23–29)
CO2 SERPL-SCNC: 16 MMOL/L (ref 23–29)
COCAINE UR QL SCN: NEGATIVE
COLOR UR AUTO: YELLOW
CREAT SERPL-MCNC: 0.6 MG/DL (ref 0.5–1.4)
CREAT SERPL-MCNC: 0.9 MG/DL (ref 0.5–1.4)
CREAT UR-MCNC: 134 MG/DL (ref 15–325)
CTP QC/QA: YES
ERYTHROCYTE [DISTWIDTH] IN BLOOD BY AUTOMATED COUNT: 14.2 % (ref 11.5–14.5)
ETHANOL SERPL-MCNC: <10 MG/DL
GFR SERPLBLD CREATININE-BSD FMLA CKD-EPI: >60 ML/MIN/1.73/M2
GFR SERPLBLD CREATININE-BSD FMLA CKD-EPI: >60 ML/MIN/1.73/M2
GLUCOSE SERPL-MCNC: 112 MG/DL (ref 70–110)
GLUCOSE SERPL-MCNC: 113 MG/DL (ref 70–110)
GLUCOSE UR QL STRIP: NEGATIVE
HCT VFR BLD AUTO: 32.7 % (ref 37–48.5)
HCV AB SERPL QL IA: NORMAL
HGB BLD-MCNC: 11.4 GM/DL (ref 12–16)
HGB UR QL STRIP: NEGATIVE
HIV 1+2 AB+HIV1 P24 AG SERPL QL IA: NORMAL
HOLD SPECIMEN: NORMAL
HOLD SPECIMEN: NORMAL
HYALINE CASTS UR QL AUTO: 15 /LPF (ref 0–1)
IMM GRANULOCYTES # BLD AUTO: 0.03 K/UL (ref 0–0.04)
IMM GRANULOCYTES NFR BLD AUTO: 0.4 % (ref 0–0.5)
KETONES UR QL STRIP: ABNORMAL
LEUKOCYTE ESTERASE UR QL STRIP: NEGATIVE
LIPASE SERPL-CCNC: 23 U/L (ref 4–60)
LYMPHOCYTES # BLD AUTO: 0.81 K/UL (ref 1–4.8)
MAGNESIUM SERPL-MCNC: 2 MG/DL (ref 1.6–2.6)
MCH RBC QN AUTO: 29.1 PG (ref 27–31)
MCHC RBC AUTO-ENTMCNC: 34.9 G/DL (ref 32–36)
MCV RBC AUTO: 83 FL (ref 82–98)
METHADONE UR QL SCN: NEGATIVE
MICROSCOPIC COMMENT: ABNORMAL
NITRITE UR QL STRIP: NEGATIVE
NUCLEATED RBC (/100WBC) (OHS): 0 /100 WBC
OPIATES UR QL SCN: NEGATIVE
PCP UR QL: NEGATIVE
PH UR STRIP: 6 [PH]
PLATELET # BLD AUTO: 443 K/UL (ref 150–450)
PMV BLD AUTO: 8.9 FL (ref 9.2–12.9)
POTASSIUM SERPL-SCNC: 3.3 MMOL/L (ref 3.5–5.1)
POTASSIUM SERPL-SCNC: 3.3 MMOL/L (ref 3.5–5.1)
PROT SERPL-MCNC: 8.1 GM/DL (ref 6–8.4)
PROT UR QL STRIP: ABNORMAL
RBC # BLD AUTO: 3.92 M/UL (ref 4–5.4)
RBC #/AREA URNS AUTO: 1 /HPF (ref 0–4)
RELATIVE EOSINOPHIL (OHS): 0 %
RELATIVE LYMPHOCYTE (OHS): 10 % (ref 18–48)
RELATIVE MONOCYTE (OHS): 5.5 % (ref 4–15)
RELATIVE NEUTROPHIL (OHS): 84 % (ref 38–73)
SODIUM SERPL-SCNC: 136 MMOL/L (ref 136–145)
SODIUM SERPL-SCNC: 137 MMOL/L (ref 136–145)
SP GR UR STRIP: 1.02
UROBILINOGEN UR STRIP-ACNC: NEGATIVE EU/DL
WBC # BLD AUTO: 8.06 K/UL (ref 3.9–12.7)
WBC #/AREA URNS AUTO: 1 /HPF (ref 0–5)

## 2025-06-13 PROCEDURE — 86803 HEPATITIS C AB TEST: CPT | Performed by: EMERGENCY MEDICINE

## 2025-06-13 PROCEDURE — 99285 EMERGENCY DEPT VISIT HI MDM: CPT | Mod: 25

## 2025-06-13 PROCEDURE — 81003 URINALYSIS AUTO W/O SCOPE: CPT | Performed by: EMERGENCY MEDICINE

## 2025-06-13 PROCEDURE — 80143 DRUG ASSAY ACETAMINOPHEN: CPT | Performed by: EMERGENCY MEDICINE

## 2025-06-13 PROCEDURE — 96361 HYDRATE IV INFUSION ADD-ON: CPT

## 2025-06-13 PROCEDURE — 80307 DRUG TEST PRSMV CHEM ANLYZR: CPT | Performed by: EMERGENCY MEDICINE

## 2025-06-13 PROCEDURE — 25500020 PHARM REV CODE 255: Performed by: EMERGENCY MEDICINE

## 2025-06-13 PROCEDURE — 81025 URINE PREGNANCY TEST: CPT | Performed by: EMERGENCY MEDICINE

## 2025-06-13 PROCEDURE — 96374 THER/PROPH/DIAG INJ IV PUSH: CPT

## 2025-06-13 PROCEDURE — 87389 HIV-1 AG W/HIV-1&-2 AB AG IA: CPT | Performed by: EMERGENCY MEDICINE

## 2025-06-13 PROCEDURE — 83735 ASSAY OF MAGNESIUM: CPT | Performed by: EMERGENCY MEDICINE

## 2025-06-13 PROCEDURE — 96375 TX/PRO/DX INJ NEW DRUG ADDON: CPT

## 2025-06-13 PROCEDURE — 25000003 PHARM REV CODE 250: Performed by: EMERGENCY MEDICINE

## 2025-06-13 PROCEDURE — 82550 ASSAY OF CK (CPK): CPT | Performed by: EMERGENCY MEDICINE

## 2025-06-13 PROCEDURE — 63600175 PHARM REV CODE 636 W HCPCS: Performed by: EMERGENCY MEDICINE

## 2025-06-13 PROCEDURE — 83690 ASSAY OF LIPASE: CPT | Performed by: EMERGENCY MEDICINE

## 2025-06-13 PROCEDURE — 85025 COMPLETE CBC W/AUTO DIFF WBC: CPT | Performed by: EMERGENCY MEDICINE

## 2025-06-13 PROCEDURE — 82077 ASSAY SPEC XCP UR&BREATH IA: CPT | Performed by: EMERGENCY MEDICINE

## 2025-06-13 PROCEDURE — 82310 ASSAY OF CALCIUM: CPT | Performed by: EMERGENCY MEDICINE

## 2025-06-13 PROCEDURE — 80053 COMPREHEN METABOLIC PANEL: CPT | Performed by: EMERGENCY MEDICINE

## 2025-06-13 RX ORDER — HALOPERIDOL LACTATE 5 MG/ML
2.5 INJECTION, SOLUTION INTRAMUSCULAR
Status: COMPLETED | OUTPATIENT
Start: 2025-06-13 | End: 2025-06-13

## 2025-06-13 RX ORDER — LORAZEPAM 2 MG/ML
1 INJECTION INTRAMUSCULAR
Status: COMPLETED | OUTPATIENT
Start: 2025-06-13 | End: 2025-06-13

## 2025-06-13 RX ORDER — ALUMINUM HYDROXIDE, MAGNESIUM HYDROXIDE, AND SIMETHICONE 1200; 120; 1200 MG/30ML; MG/30ML; MG/30ML
5 SUSPENSION ORAL
Status: COMPLETED | OUTPATIENT
Start: 2025-06-13 | End: 2025-06-13

## 2025-06-13 RX ORDER — SODIUM CHLORIDE 0.9 G/100ML
500 IRRIGANT IRRIGATION
Status: DISCONTINUED | OUTPATIENT
Start: 2025-06-13 | End: 2025-06-14 | Stop reason: HOSPADM

## 2025-06-13 RX ORDER — SYRING-NEEDL,DISP,INSUL,0.3 ML 29 G X1/2"
296 SYRINGE, EMPTY DISPOSABLE MISCELLANEOUS
Status: DISCONTINUED | OUTPATIENT
Start: 2025-06-13 | End: 2025-06-14 | Stop reason: HOSPADM

## 2025-06-13 RX ORDER — CYCLOBENZAPRINE HCL 10 MG
10 TABLET ORAL
Status: COMPLETED | OUTPATIENT
Start: 2025-06-13 | End: 2025-06-13

## 2025-06-13 RX ORDER — AMOXICILLIN 250 MG
2 CAPSULE ORAL DAILY
Qty: 30 TABLET | Refills: 0 | Status: SHIPPED | OUTPATIENT
Start: 2025-06-13

## 2025-06-13 RX ORDER — PSEUDOEPHEDRINE/ACETAMINOPHEN 30MG-500MG
100 TABLET ORAL
Status: DISCONTINUED | OUTPATIENT
Start: 2025-06-13 | End: 2025-06-14 | Stop reason: HOSPADM

## 2025-06-13 RX ORDER — FAMOTIDINE 20 MG/1
20 TABLET, FILM COATED ORAL
Status: COMPLETED | OUTPATIENT
Start: 2025-06-13 | End: 2025-06-13

## 2025-06-13 RX ORDER — BUPRENORPHINE 2 MG/1
8 TABLET SUBLINGUAL
Status: COMPLETED | OUTPATIENT
Start: 2025-06-13 | End: 2025-06-13

## 2025-06-13 RX ADMIN — HALOPERIDOL LACTATE 2.5 MG: 5 INJECTION, SOLUTION INTRAMUSCULAR at 04:06

## 2025-06-13 RX ADMIN — IOHEXOL 75 ML: 350 INJECTION, SOLUTION INTRAVENOUS at 02:06

## 2025-06-13 RX ADMIN — LORAZEPAM 1 MG: 2 INJECTION INTRAMUSCULAR; INTRAVENOUS at 04:06

## 2025-06-13 RX ADMIN — SODIUM CHLORIDE, POTASSIUM CHLORIDE, SODIUM LACTATE AND CALCIUM CHLORIDE 1000 ML: 600; 310; 30; 20 INJECTION, SOLUTION INTRAVENOUS at 01:06

## 2025-06-13 RX ADMIN — ALUMINUM HYDROXIDE, MAGNESIUM HYDROXIDE, AND SIMETHICONE 5 ML: 200; 200; 20 SUSPENSION ORAL at 03:06

## 2025-06-13 RX ADMIN — CYCLOBENZAPRINE 10 MG: 10 TABLET, FILM COATED ORAL at 04:06

## 2025-06-13 RX ADMIN — BUPRENORPHINE 8 MG: 2 TABLET SUBLINGUAL at 03:06

## 2025-06-13 RX ADMIN — FAMOTIDINE 20 MG: 20 TABLET, FILM COATED ORAL at 03:06

## 2025-06-13 NOTE — ED NOTES
Pt belonging's in safe  - 1 cream/white colored shirt  - 1 cream/white colored robe  - 1 grey & white stripped pair of shorts  - 1 grey pair of pants  - 1 home wheelchair   [FreeTextEntry1] : Márquez alert, talkative, afebrile [FreeTextEntry2] : Huge "hickey" like bleed encompassing entire forehead, w petechiae on areas of face [de-identified] : NO  intraoral petechial lesion noted [de-identified] : see description of HEAD in PE above

## 2025-06-13 NOTE — ED NOTES
Patient comes into the emergency department by EMS with complaints of heat exhaustion. Patient states that she lost her housing 2 days ago, ever since has been sitting outside out apartment building without food or water. Pt arrives to ED bed diaphoretic, sunburnt to face, hands reporting weakness, SOB, heartburn. Pt also reports she has not taken any prescription meds since. Pt slow to respond to questions, but AOx4. Patient denies CP, V/D, HA, vision changes, numbness/tingling, SI, HI, AH, VH.

## 2025-06-13 NOTE — ED PROVIDER NOTES
Encounter Date: 6/13/2025       History     Chief Complaint   Patient presents with    Sunburn     Patient has been sitting outside of her house x2 days without food/water.      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  Patient provides a vague history about her housing situation but apparently was evicted a couple of days ago and she and her mother, who is her caregiver but does not live with her permanently, had secured alternative housing that did not materialize for reasons that are not entirely clear.  Patient states that she then just stayed outside of her old apartment for the past 2 days in her wheelchair with limited access to food or water.  A neighbor saw her today, checked on her and then ultimately called EMS to have her transported here.  Patient does report some abdominal pain and distention with constipation over the last couple days, also reports vaginal bleeding that seems consistent with her menstrual cycle.  She is also complaining of bad sunburn to her exposed skin and feeling overheated.    Able to obtain collateral from patient's mother later in her visit.  Mother states that patient has had lots of changes in her medications recently, she was taken off of her high-dose Neurontin, her Ambien was not prescribed again and she has noticed lots of concerning psychiatric changes in patient.  She has not been sleeping much lately.  Patient did not lose her housing apparently in his just confused about this.  On further discussion with the patient when I asked her about this, she does endorse hearing voices though denies SI or HI.  Patient does admit to using marijuana.    The history is provided by the patient and the EMS  personnel.     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    Anxiety     Bone cancer     Depression      Past Surgical History:   Procedure Laterality Date    LEG AMPUTATION AT HIP Left 7/2015     Family History   Problem Relation Name Age of Onset    Cancer Paternal Uncle great uncle         lung    Cancer Maternal Grandmother          breast    Heart attack Maternal Grandmother      Arthritis Maternal Grandmother      Heart attack Maternal Grandfather      Arthritis Maternal Grandfather      Diabetes Paternal Grandmother      Hypertension Paternal Grandfather      Heart attack Paternal Grandfather      No Known Problems Father       Social History[1]  Review of Systems    Physical Exam     Initial Vitals [06/13/25 1259]   BP Pulse Resp Temp SpO2   (!) 150/98 (!) 112 20 98.4 °F (36.9 °C) 98 %      MAP       --         Physical Exam    Nursing note and vitals reviewed.  Constitutional: She appears well-developed and well-nourished. She is not diaphoretic. No distress.   HENT:   Head: Normocephalic and atraumatic.   MM dry   Eyes: Conjunctivae are normal.   Neck: Neck supple.   Cardiovascular:  Normal rate.           Pulmonary/Chest: No respiratory distress.   Abdominal: Abdomen is soft. She exhibits distension. There is abdominal tenderness. There is no rebound and no guarding.   Musculoskeletal:      Cervical back: Neck supple.      Comments: R LE complete amputation  L LE no swelling/deformity     Neurological: She is alert and oriented to person, place, and time. She has normal strength.   Skin: There is erythema.   Mild erythroderma to the face and dorsal aspect of the hands bilaterally         ED Course   Procedures  Labs Reviewed   COMPREHENSIVE METABOLIC PANEL - Abnormal       Result Value    Sodium 137      Potassium 3.3 (*)     Chloride 105      CO2  "15 (*)     Glucose 113 (*)     BUN 24 (*)     Creatinine 0.9      Calcium 8.7      Protein Total 8.1      Albumin 4.6      Bilirubin Total 0.5      ALP 72      AST 16      ALT 9 (*)     Anion Gap 17 (*)     eGFR >60     DRUG SCREEN PANEL, URINE EMERGENCY - Abnormal    Benzodiazepine, Urine Presumptive Positive (*)     Methadone, Urine Negative      Cocaine, Urine Negative      Opiates, Urine Negative      Barbiturates, Urine Negative      Amphetamines, Urine Negative      THC Presumptive Positive (*)     Phencyclidine, Urine Negative      Urine Creatinine 134.0      Narrative:     This screen includes the following classes of drugs at the listed cut-off:     Benzodiazepines:        200 ng/ml   Methadone:              300 ng/ml   Cocaine metabolite:     300 ng/ml   Opiates:                300 ng/ml   Barbiturates:           200 ng/ml   Amphetamines:           1000 ng/ml   Marijuana metabs (THC): 50 ng/ml   Phencyclidine (PCP):    25 ng/ml     This is a screening test. If results do not correlate with clinical presentation, then a confirmatory send out test is advised.    This report is intended for use in clinical monitoring and management of patients. It is not intended for use in employment related drug testing."   URINALYSIS, REFLEX TO URINE CULTURE - Abnormal    Color, UA Yellow      Appearance, UA Clear      pH, UA 6.0      Spec Grav UA 1.025      Protein, UA 2+ (*)     Glucose, UA Negative      Ketones, UA 3+ (*)     Bilirubin, UA Negative      Blood, UA Negative      Nitrites, UA Negative      Urobilinogen, UA Negative      Leukocyte Esterase, UA Negative     CBC WITH DIFFERENTIAL - Abnormal    WBC 8.06      RBC 3.92 (*)     HGB 11.4 (*)     HCT 32.7 (*)     MCV 83      MCH 29.1      MCHC 34.9      RDW 14.2      Platelet Count 443      MPV 8.9 (*)     Nucleated RBC 0      Neut % 84.0 (*)     Lymph % 10.0 (*)     Mono % 5.5      Eos % 0.0      Basophil % 0.1      Imm Grans % 0.4      Neut # 6.77      Lymph # " 0.81 (*)     Mono # 0.44      Eos # 0.00      Baso # 0.01      Imm Grans # 0.03     URINALYSIS MICROSCOPIC - Abnormal    RBC, UA 1      WBC, UA 1      Bacteria, UA Few (*)     Hyaline Casts, UA 15 (*)     Microscopic Comment       ACETAMINOPHEN LEVEL - Abnormal    Acetaminophen Level <3.0 (*)    HEPATITIS C ANTIBODY - Normal    Hep C Ab Interp Non-Reactive     HIV 1 / 2 ANTIBODY - Normal    HIV 1/2 Ag/Ab Non-Reactive     LIPASE - Normal    Lipase Level 23     MAGNESIUM - Normal    Magnesium  2.0     CK - Normal    CPK 89     ALCOHOL,MEDICAL (ETHANOL) - Normal    Alcohol, Serum <10     HEP C VIRUS HOLD SPECIMEN    Extra Tube Hold for add-ons.     CBC W/ AUTO DIFFERENTIAL    Narrative:     The following orders were created for panel order CBC auto differential.  Procedure                               Abnormality         Status                     ---------                               -----------         ------                     CBC with Differential[[id number]]       Abnormal            Final result                 Please view results for these tests on the individual orders.   GREY TOP URINE HOLD    Extra Tube Hold for add-ons.     POCT URINE PREGNANCY    POC Preg Test, Ur Negative       Acceptable Yes            Imaging Results              CT Abdomen Pelvis With IV Contrast NO Oral Contrast (Final result)  Result time 06/13/25 15:20:24      Final result by Tomas Espinosa MD (06/13/25 15:20:24)                   Impression:      No acute abnormality.    Moderate to large colonic and rectal stool burden may reflect constipation.  No evidence of bowel obstruction or acute bowel inflammation.    Grossly stable other incidental/nonemergent findings in the body of the report.      Electronically signed by: Tomas Espinosa MD  Date:    06/13/2025  Time:    15:20               Narrative:    EXAMINATION:  CT ABDOMEN PELVIS WITH IV CONTRAST    CLINICAL HISTORY:  Abdominal pain, acute,  nonlocalized;    TECHNIQUE:  Low dose axial images, sagittal and coronal reformations were obtained from the lung bases to the pubic symphysis following the IV administration of 75 mL of Omnipaque 350 .  Oral contrast was not given.    COMPARISON:  Chest radiograph 03/12/2019, CT pelvis 02/21/2019, CT abdomen and pelvis 06/12/2018    FINDINGS:  Beam hardening with streak artifact from overlying monitoring leads and adjacent upper extremities somewhat limits evaluation.  There is also respiratory motion artifact.    Base of the heart is within normal limits.  Imaged lung bases show mild dependent atelectasis without consolidation or pleural effusion.    Liver is normal in size with similar small geographic area of focal fatty infiltration at the anterior left lobe and small nonspecific parenchymal calcification at the posterior right lobe.  Portal vasculature is patent.    Gallbladder, pancreas, spleen, stomach, duodenum and bilateral adrenal glands are within normal limits.  No significant biliary ductal dilatation.    Bilateral kidneys are normal in size, shape and location with symmetric normal enhancement.  No hydronephrosis or significant perinephric stranding.  Ureters are nondilated.  Urinary bladder is grossly within normal limits.  No pelvic mass or significant volume free pelvic fluid.  Pelvic phleboliths noted.    Moderate to large amount of stool noted throughout the colon and rectum.  No evidence of bowel obstruction or acute bowel inflammation.  No bowel pneumatosis or portal venous gas.  Appendix not localized; however, no pericecal inflammatory change.  Terminal ileum is within normal limits.    No ascites, free air or lymphadenopathy definitively seen allowing for paucity of intra-abdominal fat with closely apposed loops of bowel and streak artifact.    No significant atherosclerosis.  No aortic aneurysm or dissection.    Patient is status post prior disarticulation of the right hip and resection of  the right lower extremity for neoplasm.  Osseous structures appear stable.  Extraperitoneal soft tissues appear stable.  No acute or destructive osseous process seen.                                       Medications   glycerin 99.5% topical solution 100 mL (has no administration in time range)     And   magnesium citrate solution 296 mL (has no administration in time range)     And   sodium chloride 0.9% irrigation 500 mL (has no administration in time range)   lactated ringers bolus 1,000 mL (0 mLs Intravenous Stopped 6/13/25 1454)   buprenorphine HCL SL tablet 8 mg (8 mg Sublingual Given 6/13/25 1515)   iohexoL (OMNIPAQUE 350) injection 75 mL (75 mLs Intravenous Given 6/13/25 1433)   famotidine tablet 20 mg (20 mg Oral Given 6/13/25 1504)   aluminum-magnesium hydroxide-simethicone 200-200-20 mg/5 mL suspension 5 mL (5 mLs Oral Given 6/13/25 1504)   cyclobenzaprine tablet 10 mg (10 mg Oral Given 6/13/25 1621)     Medical Decision Making  This patient is experiencing an acute psychiatric emergency - seems to have acute psychosis and/or blanca though I am also worried about ruling out severe dehydration given that she has been outside.      The ED plan will include the following interventions:    -Labs including CBC, CMP, TSH, UA, urine drug tox screen, ethanol for medical clearance  -1:1 observation  -Medications will not be needed for agitation/patient and staff safety  -PEC placed because patient is not safe for d/c home either because of grave disability  -Once medically cleared, patient will be transferred to outside facility for psychiatric admission        Amount and/or Complexity of Data Reviewed  External Data Reviewed: labs and notes.  Labs: ordered. Decision-making details documented in ED Course.  Radiology: ordered and independent interpretation performed. Decision-making details documented in ED Course.  ECG/medicine tests: ordered and independent interpretation performed. Decision-making details  documented in ED Course.    Risk  OTC drugs.  Prescription drug management.  Decision regarding hospitalization.               ED Course as of 06/13/25 1627 Fri Jun 13, 2025   1519 hCG Qualitative, Urine: Negative [AS]   1519 Benzodiazepine, Urine(!): Presumptive Positive [AS]   1519 Marijuana (THC) Metabolite(!): Presumptive Positive [AS]   1520 Acetaminophen Level(!): <3.0 [AS]   1520 Leukocyte Esterase, UA: Negative [AS]   1520 Urobilinogen, UA: Negative [AS]   1520 NITRITE UA: Negative [AS]   1520 Sodium: 137 [AS]   1520 Potassium(!): 3.3 [AS]   1521 BUN(!): 24 [AS]   1521 Creatinine: 0.9 [AS]   1627 CT scan shows significant constipation but no bowel obstruction.  Brown bomber enema ordered I am will discharge with the Colace and senna for psychiatric facility. [AS]      ED Course User Index  [AS] Lakeisha Rdz MD       Medically cleared for psychiatry placement: 6/13/2025  2:50 PM                   Clinical Impression:  Final diagnoses:  [F29] Psychosis, unspecified psychosis type (Primary)          ED Disposition Condition    Transfer to Psych Facility Stable          ED Prescriptions       Medication Sig Dispense Start Date End Date Auth. Provider    senna-docusate (PERICOLACE) 8.6-50 mg per tablet Take 2 tablets by mouth once daily. 30 tablet 6/13/2025 -- Lakeisha Rdz MD          Follow-up Information    None                [1]   Social History  Tobacco Use    Smoking status: Every Day     Current packs/day: 1.00     Types: Cigarettes    Smokeless tobacco: Never   Substance Use Topics    Alcohol use: No    Drug use: No        Lakeisha Rdz MD  06/13/25 1627

## 2025-06-13 NOTE — ED NOTES
MD Madina spoke with pt's mother. Pt's mother reporting pt has been confused, not eating or drinking, has not been taking care of herself at home. MD Madina activated code watch PEC placed.

## 2025-06-13 NOTE — ED NOTES
Pt undressed, placed in paper scrubs, hooked up to monitor (okay per MD). Equipment removed from pt's room, ED tech at bedside for safety sit. Pt denying SI/HI, AH, VH.

## 2025-06-14 NOTE — ED NOTES
Pt. Refused to leave with transportation to [place] with out an enema and disimpaction. After telling pt. That this is out of our nursing scope of practice, her mother stated she would do it and need assistance. MD and charge was notified of this behavior. Pt.'s mother used two enemas and attempted multiple times to disimpact. After attempting to use the bed pan pt. Was placed on a bedside commode where she successfully had a bowel movement.

## 2025-06-14 NOTE — PLAN OF CARE
Discharge Planning Assessment:    Patient admitted on: 6-13-25  Chart reviewed today  Care plan discussed with ER treatment team   attending Dr Rdz    Current Dispo: pEc'd  Medically cleared, Await acceptance     Transportation: per [facility] policy  Case management to follow

## 2025-06-14 NOTE — PLAN OF CARE
[facility] - Emergency Dept  Initial Discharge Assessment       Primary Care Provider: Faby, Primary Doctor    Admission Diagnosis: No admission diagnoses are documented for this encounter.    Admission Date: 6/13/2025  Expected Discharge Date:     Transition of Care Barriers: (P) Does not adhere to care plan, Mental illness    Payor: MEDICAID / Plan: [employer] [facility] ([employer]) / Product Type: Managed Medicaid /     Extended Emergency Contact Information  Primary Emergency Contact: Dilip Reis   [facility]  Home Phone: [phone]  Relation: Mother    Discharge Plan A: (P) Psychiatric hospital  Discharge Plan B: (P) Psychiatric hospital      [facility] [place] - [address]  [address]  [place] [address]  Phone: [phone] Fax: [phone]      Initial Assessment (most recent)       Adult Discharge Assessment - 06/13/25 2046          Discharge Assessment    Assessment Type Discharge Planning Assessment (P)      Confirmed/corrected address, phone number and insurance No (P)      Reason No family to provide information/patient unable to answer (P)      Source of Information patient (P)      Communicated TANVI with patient/caregiver Yes (P)      Do you expect to return to your current living situation? No (P)      Prior to hospitilization cognitive status: Alert/Oriented (P)      Current cognitive status: Alert/Oriented (P)      Equipment Currently Used at Home wheelchair (P)      Patient currently being followed by outpatient case management? Unable to determine (comments) (P)      Do you have any problems affording any of your prescribed medications? TBD (P)      How do you get to doctors appointments? health plan transportation;public transportation;family or friend will provide (P)      Discharge Plan A Psychiatric hospital (P)      Discharge Plan B Psychiatric hospital (P)      DME Needed Upon Discharge  none (P)      Transition of Care Barriers  Does not adhere to care plan;Mental illness (P)         Physical Activity    On average, how many days per week do you engage in moderate to strenuous exercise (like a brisk walk)? Patient unable to answer (P)      On average, how many minutes do you engage in exercise at this level? Patient unable to answer (P)         Financial Resource Strain    How hard is it for you to pay for the very basics like food, housing, medical care, and heating? Patient unable to answer (P)         Housing Stability    In the last 12 months, was there a time when you were not able to pay the mortgage or rent on time? Patient unable to answer (P)      At any time in the past 12 months, were you homeless or living in a shelter (including now)? Patient unable to answer (P)         Transportation Needs    In the past 12 months, has lack of transportation kept you from medical appointments or from getting medications? Patient unable to answer (P)      In the past 12 months, has lack of transportation kept you from meetings, work, or from getting things needed for daily living? Patient unable to answer (P)         Food Insecurity    Within the past 12 months, you worried that your food would run out before you got the money to buy more. Patient unable to answer (P)      Within the past 12 months, the food you bought just didn't last and you didn't have money to get more. Patient unable to answer (P)         Stress    Do you feel stress - tense, restless, nervous, or anxious, or unable to sleep at night because your mind is troubled all the time - these days? Patient unable to answer (P)         Social Isolation    How often do you feel lonely or isolated from those around you?  Patient unable to answer (P)         Alcohol Use    Q1: How often do you have a drink containing alcohol? Patient unable to answer (P)      Q2: How many drinks containing alcohol do you have on a typical day when you are drinking? Patient unable to answer (P)       Q3: How often do you have six or more drinks on one occasion? Patient unable to answer (P)         Utilities    In the past 12 months has the electric, gas, oil, or water company threatened to shut off services in your home? Patient unable to answer (P)         Health Literacy    How often do you need to have someone help you when you read instructions, pamphlets, or other written material from your doctor or pharmacy? Patient unable to respond (P)

## 2025-06-30 ENCOUNTER — HOSPITAL ENCOUNTER (EMERGENCY)
Facility: HOSPITAL | Age: 45
Discharge: PSYCHIATRIC OR SUBSTANCE USE FACILITY | End: 2025-06-30
Attending: EMERGENCY MEDICINE
Payer: MEDICAID

## 2025-06-30 VITALS
SYSTOLIC BLOOD PRESSURE: 97 MMHG | RESPIRATION RATE: 16 BRPM | WEIGHT: 134.94 LBS | HEIGHT: 65 IN | HEART RATE: 73 BPM | DIASTOLIC BLOOD PRESSURE: 63 MMHG | BODY MASS INDEX: 22.48 KG/M2 | TEMPERATURE: 98 F | OXYGEN SATURATION: 100 %
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
## 2025-06-30 DIAGNOSIS — K59.00 CONSTIPATION, UNSPECIFIED CONSTIPATION TYPE: Primary | ICD-10-CM

## 2025-06-30 DIAGNOSIS — Z79.899 POLYPHARMACY: ICD-10-CM

## 2025-06-30 DIAGNOSIS — F29 PSYCHOSIS, UNSPECIFIED PSYCHOSIS TYPE: ICD-10-CM

## 2025-06-30 DIAGNOSIS — R53.81 DISABILITY AFFECTING DAILY LIVING: ICD-10-CM

## 2025-06-30 LAB
ABSOLUTE EOSINOPHIL (OHS): 0.01 K/UL
ABSOLUTE MONOCYTE (OHS): 0.43 K/UL (ref 0.3–1)
ABSOLUTE NEUTROPHIL COUNT (OHS): 2.56 K/UL (ref 1.8–7.7)
ALBUMIN SERPL BCP-MCNC: 4.5 G/DL (ref 3.5–5.2)
ALP SERPL-CCNC: 70 UNIT/L (ref 40–150)
ALT SERPL W/O P-5'-P-CCNC: 9 UNIT/L (ref 10–44)
AMPHET UR QL SCN: NEGATIVE
ANION GAP (OHS): 16 MMOL/L (ref 8–16)
AST SERPL-CCNC: 16 UNIT/L (ref 11–45)
B-HCG UR QL: NEGATIVE
BARBITURATE SCN PRESENT UR: NEGATIVE
BASOPHILS # BLD AUTO: 0.03 K/UL
BASOPHILS NFR BLD AUTO: 0.7 %
BENZODIAZ UR QL SCN: ABNORMAL
BILIRUB SERPL-MCNC: 0.3 MG/DL (ref 0.1–1)
BILIRUB UR QL STRIP.AUTO: NEGATIVE
BUN SERPL-MCNC: 15 MG/DL (ref 6–20)
CALCIUM SERPL-MCNC: 9.9 MG/DL (ref 8.7–10.5)
CANNABINOIDS UR QL SCN: ABNORMAL
CHLORIDE SERPL-SCNC: 101 MMOL/L (ref 95–110)
CK SERPL-CCNC: 50 U/L (ref 20–180)
CLARITY UR: CLEAR
CO2 SERPL-SCNC: 22 MMOL/L (ref 23–29)
COCAINE UR QL SCN: NEGATIVE
COLOR UR AUTO: YELLOW
CREAT SERPL-MCNC: 0.7 MG/DL (ref 0.5–1.4)
CREAT UR-MCNC: 125 MG/DL (ref 15–325)
CTP QC/QA: YES
ERYTHROCYTE [DISTWIDTH] IN BLOOD BY AUTOMATED COUNT: 14.6 % (ref 11.5–14.5)
ETHANOL SERPL-MCNC: <10 MG/DL
ETHANOL UR-MCNC: <10 MG/DL
GFR SERPLBLD CREATININE-BSD FMLA CKD-EPI: >60 ML/MIN/1.73/M2
GLUCOSE SERPL-MCNC: 56 MG/DL (ref 70–110)
GLUCOSE UR QL STRIP: NEGATIVE
HCT VFR BLD AUTO: 36.5 % (ref 37–48.5)
HGB BLD-MCNC: 12.2 GM/DL (ref 12–16)
HGB UR QL STRIP: NEGATIVE
HOLD SPECIMEN: NORMAL
IMM GRANULOCYTES # BLD AUTO: 0.01 K/UL (ref 0–0.04)
IMM GRANULOCYTES NFR BLD AUTO: 0.2 % (ref 0–0.5)
KETONES UR QL STRIP: ABNORMAL
LEUKOCYTE ESTERASE UR QL STRIP: NEGATIVE
LYMPHOCYTES # BLD AUTO: 1.07 K/UL (ref 1–4.8)
MAGNESIUM SERPL-MCNC: 1.6 MG/DL (ref 1.6–2.6)
MCH RBC QN AUTO: 28.6 PG (ref 27–31)
MCHC RBC AUTO-ENTMCNC: 33.4 G/DL (ref 32–36)
MCV RBC AUTO: 86 FL (ref 82–98)
METHADONE UR QL SCN: NEGATIVE
NITRITE UR QL STRIP: NEGATIVE
NUCLEATED RBC (/100WBC) (OHS): 0 /100 WBC
OPIATES UR QL SCN: NEGATIVE
PCP UR QL: NEGATIVE
PH UR STRIP: 5 [PH]
PHOSPHATE SERPL-MCNC: 4.2 MG/DL (ref 2.7–4.5)
PLATELET # BLD AUTO: 334 K/UL (ref 150–450)
PMV BLD AUTO: 9.4 FL (ref 9.2–12.9)
POCT GLUCOSE: 92 MG/DL (ref 70–110)
POTASSIUM SERPL-SCNC: 3.3 MMOL/L (ref 3.5–5.1)
PROT SERPL-MCNC: 8.2 GM/DL (ref 6–8.4)
PROT UR QL STRIP: ABNORMAL
RBC # BLD AUTO: 4.26 M/UL (ref 4–5.4)
RELATIVE EOSINOPHIL (OHS): 0.2 %
RELATIVE LYMPHOCYTE (OHS): 26 % (ref 18–48)
RELATIVE MONOCYTE (OHS): 10.5 % (ref 4–15)
RELATIVE NEUTROPHIL (OHS): 62.4 % (ref 38–73)
SODIUM SERPL-SCNC: 139 MMOL/L (ref 136–145)
SP GR UR STRIP: 1.02
UROBILINOGEN UR STRIP-ACNC: NEGATIVE EU/DL
WBC # BLD AUTO: 4.11 K/UL (ref 3.9–12.7)

## 2025-06-30 PROCEDURE — 81003 URINALYSIS AUTO W/O SCOPE: CPT | Performed by: EMERGENCY MEDICINE

## 2025-06-30 PROCEDURE — 93010 ELECTROCARDIOGRAM REPORT: CPT | Mod: ,,, | Performed by: INTERNAL MEDICINE

## 2025-06-30 PROCEDURE — 82550 ASSAY OF CK (CPK): CPT | Performed by: EMERGENCY MEDICINE

## 2025-06-30 PROCEDURE — 96360 HYDRATION IV INFUSION INIT: CPT

## 2025-06-30 PROCEDURE — 83735 ASSAY OF MAGNESIUM: CPT | Performed by: EMERGENCY MEDICINE

## 2025-06-30 PROCEDURE — 93005 ELECTROCARDIOGRAM TRACING: CPT

## 2025-06-30 PROCEDURE — 82962 GLUCOSE BLOOD TEST: CPT

## 2025-06-30 PROCEDURE — 84100 ASSAY OF PHOSPHORUS: CPT | Performed by: EMERGENCY MEDICINE

## 2025-06-30 PROCEDURE — 80053 COMPREHEN METABOLIC PANEL: CPT | Performed by: EMERGENCY MEDICINE

## 2025-06-30 PROCEDURE — 80307 DRUG TEST PRSMV CHEM ANLYZR: CPT | Performed by: EMERGENCY MEDICINE

## 2025-06-30 PROCEDURE — 25000003 PHARM REV CODE 250: Performed by: EMERGENCY MEDICINE

## 2025-06-30 PROCEDURE — 99285 EMERGENCY DEPT VISIT HI MDM: CPT | Mod: 25

## 2025-06-30 PROCEDURE — 81025 URINE PREGNANCY TEST: CPT | Performed by: EMERGENCY MEDICINE

## 2025-06-30 PROCEDURE — 85025 COMPLETE CBC W/AUTO DIFF WBC: CPT | Performed by: EMERGENCY MEDICINE

## 2025-06-30 PROCEDURE — 82077 ASSAY SPEC XCP UR&BREATH IA: CPT | Performed by: EMERGENCY MEDICINE

## 2025-06-30 RX ORDER — HYDROXYZINE PAMOATE 25 MG/1
50 CAPSULE ORAL
Status: DISCONTINUED | OUTPATIENT
Start: 2025-06-30 | End: 2025-06-30 | Stop reason: HOSPADM

## 2025-06-30 RX ORDER — TIZANIDINE 2 MG/1
8 TABLET ORAL
Status: COMPLETED | OUTPATIENT
Start: 2025-06-30 | End: 2025-06-30

## 2025-06-30 RX ORDER — BISACODYL 5 MG
10 TABLET, DELAYED RELEASE (ENTERIC COATED) ORAL
Status: COMPLETED | OUTPATIENT
Start: 2025-06-30 | End: 2025-06-30

## 2025-06-30 RX ORDER — LAMOTRIGINE 25 MG/1
75 TABLET ORAL ONCE
Status: COMPLETED | OUTPATIENT
Start: 2025-06-30 | End: 2025-06-30

## 2025-06-30 RX ORDER — ONDANSETRON 4 MG/1
4 TABLET, ORALLY DISINTEGRATING ORAL
Status: COMPLETED | OUTPATIENT
Start: 2025-06-30 | End: 2025-06-30

## 2025-06-30 RX ORDER — ONDANSETRON 4 MG/1
4 TABLET, ORALLY DISINTEGRATING ORAL
Status: DISCONTINUED | OUTPATIENT
Start: 2025-06-30 | End: 2025-06-30 | Stop reason: HOSPADM

## 2025-06-30 RX ORDER — BUPRENORPHINE 2 MG/1
2 TABLET SUBLINGUAL DAILY
Status: DISCONTINUED | OUTPATIENT
Start: 2025-06-30 | End: 2025-06-30

## 2025-06-30 RX ORDER — LAMOTRIGINE 25 MG/1
75 TABLET ORAL DAILY
Status: DISCONTINUED | OUTPATIENT
Start: 2025-06-30 | End: 2025-06-30

## 2025-06-30 RX ORDER — ARIPIPRAZOLE 15 MG/1
15 TABLET ORAL
Status: COMPLETED | OUTPATIENT
Start: 2025-06-30 | End: 2025-06-30

## 2025-06-30 RX ORDER — DIAZEPAM 5 MG/1
10 TABLET ORAL
Status: COMPLETED | OUTPATIENT
Start: 2025-06-30 | End: 2025-06-30

## 2025-06-30 RX ORDER — BUPRENORPHINE HYDROCHLORIDE 8 MG/1
8 TABLET SUBLINGUAL DAILY
Status: DISCONTINUED | OUTPATIENT
Start: 2025-06-30 | End: 2025-06-30 | Stop reason: HOSPADM

## 2025-06-30 RX ORDER — ONDANSETRON HYDROCHLORIDE 2 MG/ML
4 INJECTION, SOLUTION INTRAVENOUS
Status: DISCONTINUED | OUTPATIENT
Start: 2025-06-30 | End: 2025-06-30

## 2025-06-30 RX ORDER — POLYETHYLENE GLYCOL 3350 17 G/17G
17 POWDER, FOR SOLUTION ORAL
Status: DISCONTINUED | OUTPATIENT
Start: 2025-06-30 | End: 2025-06-30 | Stop reason: HOSPADM

## 2025-06-30 RX ORDER — GABAPENTIN 300 MG/1
300 CAPSULE ORAL
Status: COMPLETED | OUTPATIENT
Start: 2025-06-30 | End: 2025-06-30

## 2025-06-30 RX ORDER — HYDROXYZINE PAMOATE 25 MG/1
50 CAPSULE ORAL
Status: DISCONTINUED | OUTPATIENT
Start: 2025-06-30 | End: 2025-06-30

## 2025-06-30 RX ADMIN — TIZANIDINE 8 MG: 2 TABLET ORAL at 10:06

## 2025-06-30 RX ADMIN — BUPRENORPHINE 8 MG: 8 TABLET SUBLINGUAL at 11:06

## 2025-06-30 RX ADMIN — HYDROXYZINE PAMOATE 50 MG: 25 CAPSULE ORAL at 02:06

## 2025-06-30 RX ADMIN — DIAZEPAM 10 MG: 5 TABLET ORAL at 10:06

## 2025-06-30 RX ADMIN — ONDANSETRON 4 MG: 4 TABLET, ORALLY DISINTEGRATING ORAL at 07:06

## 2025-06-30 RX ADMIN — SODIUM CHLORIDE 1000 ML: 9 INJECTION, SOLUTION INTRAVENOUS at 04:06

## 2025-06-30 RX ADMIN — ARIPIPRAZOLE 15 MG: 15 TABLET ORAL at 10:06

## 2025-06-30 RX ADMIN — LAMOTRIGINE 75 MG: 25 TABLET ORAL at 10:06

## 2025-06-30 RX ADMIN — GABAPENTIN 300 MG: 300 CAPSULE ORAL at 10:06

## 2025-06-30 RX ADMIN — BISACODYL 10 MG: 5 TABLET, COATED ORAL at 08:06

## 2025-06-30 NOTE — ED NOTES
Pt is currently on the bedside commode after fleet enema was administered by this RN and Lizz SEGAL.

## 2025-06-30 NOTE — PROVIDER PROGRESS NOTES - EMERGENCY DEPT.
Encounter Date: 6/30/2025    ED Physician Progress Notes        I assumed care of patient at sign out pending: CT head, labs.    43yo F pending repeat finger stick glucose + brain CT, then psych. 43yo F, AKA on right. Came in stating she was locked out of house twice, outside in heat, then laid down in grass to cool off, someone called 911 and got here. Similar presentation 2 weeks ago. Hallucinating, transferred to psych, there for a few weeks, discharged.  Glucose 56 - getting juice and repeating. Getting head CT. PEC'd for tangentiality, grave disability. If all negative, send to psych. If improved, could go home. UA, repeat glucose, CT head.        Summary of Results:    CT Head Without Contrast   Final Result      No evidence of acute intracranial pathology.      Electronically signed by resident: George Saldana   Date:    06/30/2025   Time:    08:51      Electronically signed by: George Giang   Date:    06/30/2025   Time:    09:05        Labs Reviewed   COMPREHENSIVE METABOLIC PANEL - Abnormal       Result Value    Sodium 139      Potassium 3.3 (*)     Chloride 101      CO2 22 (*)     Glucose 56 (*)     BUN 15      Creatinine 0.7      Calcium 9.9      Protein Total 8.2      Albumin 4.5      Bilirubin Total 0.3      ALP 70      AST 16      ALT 9 (*)     Anion Gap 16      eGFR >60     CBC WITH DIFFERENTIAL - Abnormal    WBC 4.11      RBC 4.26      HGB 12.2      HCT 36.5 (*)     MCV 86      MCH 28.6      MCHC 33.4      RDW 14.6 (*)     Platelet Count 334      MPV 9.4      Nucleated RBC 0      Neut % 62.4      Lymph % 26.0      Mono % 10.5      Eos % 0.2      Basophil % 0.7      Imm Grans % 0.2      Neut # 2.56      Lymph # 1.07      Mono # 0.43      Eos # 0.01      Baso # 0.03      Imm Grans # 0.01     URINALYSIS, REFLEX TO URINE CULTURE - Abnormal    Color, UA Yellow      Appearance, UA Clear      pH, UA 5.0      Spec Grav UA 1.020      Protein, UA Trace (*)     Glucose, UA Negative      Ketones, UA 3+ (*)      "Bilirubin, UA Negative      Blood, UA Negative      Nitrites, UA Negative      Urobilinogen, UA Negative      Leukocyte Esterase, UA Negative     TOXICOLOGY SCREEN, URINE, RANDOM (COMPLIANCE) - Abnormal    Alcohol, Urine <10      Benzodiazepine, Urine Presumptive Positive (*)     Methadone, Urine Negative      Cocaine, Urine Negative      Opiates, Urine Negative      Barbiturates, Urine Negative      Amphetamines, Urine Negative      THC Presumptive Positive (*)     Phencyclidine, Urine Negative      Urine Creatinine 125.0      Narrative:     This screen includes the following classes of drugs at the listed cut-off:     Benzodiazepines:        200 ng/ml   Methadone:              300 ng/ml   Cocaine metabolite:     300 ng/ml   Opiates:                300 ng/ml   Barbiturates:           200 ng/ml   Amphetamines:           1000 ng/ml   Marijuana metabs (THC): 50 ng/ml   Phencyclidine (PCP):    25 ng/ml     This is a screening test. If results do not correlate with clinical presentation, then a confirmatory send out test is advised.    This report is intended for use in clinical monitoring and management of patients. It is not intended for use in employment related drug testing."   MAGNESIUM - Normal    Magnesium  1.6     PHOSPHORUS - Normal    Phosphorus Level 4.2     CK - Normal    CPK 50     ALCOHOL,MEDICAL (ETHANOL) - Normal    Alcohol, Serum <10     POCT URINE PREGNANCY - Normal    POC Preg Test, Ur Negative       Acceptable Yes     CBC W/ AUTO DIFFERENTIAL    Narrative:     The following orders were created for panel order CBC auto differential.  Procedure                               Abnormality         Status                     ---------                               -----------         ------                     CBC with Differential[[id number]]       Abnormal            Final result                 Please view results for these tests on the individual orders.   GOLD TOP HOLD    Extra Tube Hold " "for add-ons.     EXTRA TUBES    Narrative:     The following orders were created for panel order EXTRA TUBES.  Procedure                               Abnormality         Status                     ---------                               -----------         ------                     Lavender Top Hold[[id number]]                                                          Gold Top Hold[[id number]]                                   Final result               Gold Top Hold[[id number]]                                                                Please view results for these tests on the individual orders.   LAVENDER TOP HOLD   GOLD TOP HOLD   GREY TOP URINE HOLD   POCT GLUCOSE    POCT Glucose 92     POCT GLUCOSE MONITORING CONTINUOUS     Vitals:    06/30/25 0210 06/30/25 0345 06/30/25 0615 06/30/25 0735   BP: 133/79 (!) 146/72 134/75    Pulse: 96 100 95    Resp: 18 16 16    Temp: 98.3 °F (36.8 °C) 97.8 °F (36.6 °C) 98.1 °F (36.7 °C)    TempSrc: Oral Oral Oral    SpO2: 99% 100% 98%    Weight: 61.2 kg (135 lb)   61.2 kg (134 lb 14.7 oz)   Height: 5' 5" (1.651 m)       06/30/25 1141   BP: 110/70   Pulse: 83   Resp: 16   Temp: 98.1 °F (36.7 °C)   TempSrc: Oral   SpO2: 97%   Weight:    Height:      The patient's mother has a arrived.  The patient is more back to baseline.  She is soft-spoken but is having linear thoughts.  She reports she went outside just to get some air but locked herself out.  It has a new living facility so she keeps accidentally locking herself out.  She has no thoughts of wanting to harm herself or others.  Her mom actually believes that she is at baseline and does not think she needs to go back to a psychiatric facility.  We discussed ways to help prevent locking herself out including keeping a key around her neck.  It both feel comfortable with her going home.  They will follow up with her psychiatrist.  She also has home health resources coming in soon.  I do not think she needs to be PEC'd so this " was rescinded.  She received all of her medications as prescribed.  She is taking her medications at home as well which is reassuring.  Stable for discharge with return precautions    Update: now patient is reporting hearing voices. She is unsure if they are from just the noise from the hospital or new hallucinations. Not telling her to hurt herself or anyone. Still do not think she needs a PEC but will have psych come see her to make sure she is cleared to go home    Disposition:  pending.        Dispo:  Transfer to psych facility      Psychiatry has seen the patient and they are concerned and believes she needs to go back to inpatient psychiatry.  Pec has gone back into place.  She is medically cleared based on previous workup for placement to psychiatric facility.

## 2025-06-30 NOTE — CONSULTS
CONSULTATION LIAISON PSYCHIATRY INITIAL EVALUATION    Patient Name: Val Gil  MRN: [id number]  Patient Class: Emergency  Admission Date: 6/30/2025  Attending Physician: Brandan Chong MD      SUBJECTIVE:   Val Gil is a 44 y.o. 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    Psychiatry consulted for Hallucinations    Upon initiation of interview, pt was resting in bed. Drowsy on interview. Had to be woken up multiple times through out the interview. Thought process is disorganized at times. Poor memory of the events that lead up to hospitalization. Reports that she got locked out of her house again but does not know how this happened. Reporting intermittent AH hallucinations of non-distinct voices. Unable to tell interviewer what medications she is on. Lives at home alone. Mother is able to come check on her sometimes but has periods of time alone. Denying SI or HI at this time. Asked to terminate interview due to feeling tired.     Patient had a similar presentation at the beginning of the month where she was hallucinating after being found down. She spent 10 days on inpatient psych and was started on abilify for the hallucinations.       Collateral:   Yes - Smiley (mother) [phone]    Mother is concerned about the patient. Reports that she came back from inpatient psych and was intermittently compliant with her abilify. Collateral noticed that the patient was off and got her to take a dose of the abilify. Longstanding psych history. Was supposed to get set up with an ACT team and possibly home health services to help the patient function at home. Amenable to patient going back to inpatient to get back on VALERIO. Collateral feels like this will help to go back to inpatient.     Psychiatric Review of Systems:  sleep: no  appetite: no  weight: no  energy/anergy:  "no  interest/pleasure/anhedonia: no  somatic symptoms: no  libido: no  anxiety/panic: yes  guilty/hopelessness: no  concentration: no  S.I.B.s/risky behavior: no    Medical Review Of Systems:  Note in HPI    Psychiatric History:  Previous Medication Trials: Yes - Ativan, Valium, Abililfy  Previous Psychiatric Hospitalizations: Yes - Recently admitted in June 2025  Previous suicide attempts: Yes - via overdose  Current/active homicidal ideation/plan/intent: No  History of threats/arrests associated with violent conduct - No  Access to firearms/lethal weapons - unknown  Family Psychiatric History: Yes   Outpatient Psychiatrist: Yes - referred to [facility] team  Outpatient Therapist: No    Social History:  Marital Status: single  Children: 0   Employment Status: on disability  Education: high school diploma/GED  Special Ed: no  Housing Status: Yes - alone  Developmental History: Not Assessed  History of Abuse: No    Substance Abuse History:  Recreational Drugs: unable to assess  Use of Alcohol: denied  Rehab History: No  Tobacco Use: No  Use of Caffeine: No  Use of OTC: No  Is the patient aware of the biomedical complications associated with substance abuse and mental illness? Yes   Legal consequences of chemical use: No    Legal History:  Past Charges/Incarcerations: No  Pending Charges: No    Psychosocial Factors:  Stressors: health.   Functioning Relationships: good support system      OBJECTIVE     Vital Signs:  Temp:  [97.8 °F (36.6 °C)-98.3 °F (36.8 °C)]   Pulse:  []   Resp:  [16-18]   BP: (110-146)/(70-79)   SpO2:  [97 %-100 %]     Mental Status Exam:  General Appearance: In bed. Falling asleep. S/p R hip removal  Behavior: minimal responses  Involuntary Movements and Motor Activity: no abnormal involuntary movements noted  Gait and Station: unable to assess - patient lying down or seated  Speech and Language: soft, monotone, mumbling  Mood: "okay"  Affect: constricted  Thought Process and Associations: " disorganized  Perceptual Disturbances: hallucinations:  auditory  Thought Content and Perceptions:: no suicidal ideation, no homicidal ideation  Sensorium and Orientation: drowsy  Recent and Remote Memory: impaired  Attention and Concentration: impaired, inattentive to conversation  Fund of Knowledge: impaired  Insight: impaired  Judgment: impaired    CAM ICU positive? no      ASSESSMENT & RECOMMENDATIONS   Unspecified Psychosis  Hx MDD  Hx GLORIA      Scheduled Medication(s):  Defer to inpatient Psychiatry    PRN Medication(s):  Zyprexa 10mg PO/IM Q8 Prn for nonredirectable agitation    Risk Assessment / Legal Status  Needs PEC as patient is gravely disabled. Needs a 1:1 sitter at all times.     Follow-up:  Will follow-up while in house.    Disposition:   Seek involuntary inpatient psychiatric admission for stabilization of acute psychiatric illness once medically cleared. The patient &/or their family was informed of plan to transfer to an inpatient psychiatric unit once placement is found.     Please contact ON CALL psychiatry service (24/7) for any acute issues that may arise.    Dr. Kenneth GAMEZ Psychiatry  [facility]  6/30/2025 2:12 PM        --------------------------------------------------------------------------------------------------------------------------------------------------------------------------------------------------------------------------------------    CONTINUED OBJECTIVE clinical data & findings reviewed and noted for above decision making    Current Medications:   Scheduled Meds:    buprenorphine HCL  8 mg Sublingual Daily    ondansetron  4 mg Oral ED 1 Time    polyethylene glycol  17 g Oral ED 1 Time     PRN Meds:   Current Facility-Administered Medications:     hydrOXYzine pamoate, 50 mg, Oral, PRN    Allergies:   Review of patient's allergies indicates:   Allergen Reactions    Ibuprofen Other (See Comments)       Vitals  Vitals:    06/30/25 1141   BP: 110/70    Pulse: 83   Resp: 16   Temp: 98.1 °F (36.7 °C)       Labs/Imaging/Studies:  Recent Results (from the past 24 hours)   Gold Top Hold    Collection Time: 06/30/25  3:16 AM   Result Value Ref Range    Extra Tube Hold for add-ons.    Comprehensive metabolic panel    Collection Time: 06/30/25  4:19 AM   Result Value Ref Range    Sodium 139 136 - 145 mmol/L    Potassium 3.3 (L) 3.5 - 5.1 mmol/L    Chloride 101 95 - 110 mmol/L    CO2 22 (L) 23 - 29 mmol/L    Glucose 56 (L) 70 - 110 mg/dL    BUN 15 6 - 20 mg/dL    Creatinine 0.7 0.5 - 1.4 mg/dL    Calcium 9.9 8.7 - 10.5 mg/dL    Protein Total 8.2 6.0 - 8.4 gm/dL    Albumin 4.5 3.5 - 5.2 g/dL    Bilirubin Total 0.3 0.1 - 1.0 mg/dL    ALP 70 40 - 150 unit/L    AST 16 11 - 45 unit/L    ALT 9 (L) 10 - 44 unit/L    Anion Gap 16 8 - 16 mmol/L    eGFR >60 >60 mL/min/1.73/m2   Magnesium    Collection Time: 06/30/25  4:19 AM   Result Value Ref Range    Magnesium  1.6 1.6 - 2.6 mg/dL   Phosphorus    Collection Time: 06/30/25  4:19 AM   Result Value Ref Range    Phosphorus Level 4.2 2.7 - 4.5 mg/dL   CPK    Collection Time: 06/30/25  4:19 AM   Result Value Ref Range    CPK 50 20 - 180 U/L   CBC with Differential    Collection Time: 06/30/25  4:19 AM   Result Value Ref Range    WBC 4.11 3.90 - 12.70 K/uL    RBC 4.26 4.00 - 5.40 M/uL    HGB 12.2 12.0 - 16.0 gm/dL    HCT 36.5 (L) 37.0 - 48.5 %    MCV 86 82 - 98 fL    MCH 28.6 27.0 - 31.0 pg    MCHC 33.4 32.0 - 36.0 g/dL    RDW 14.6 (H) 11.5 - 14.5 %    Platelet Count 334 150 - 450 K/uL    MPV 9.4 9.2 - 12.9 fL    Nucleated RBC 0 <=0 /100 WBC    Neut % 62.4 38 - 73 %    Lymph % 26.0 18 - 48 %    Mono % 10.5 4 - 15 %    Eos % 0.2 <=8 %    Basophil % 0.7 <=1.9 %    Imm Grans % 0.2 0.0 - 0.5 %    Neut # 2.56 1.8 - 7.7 K/uL    Lymph # 1.07 1 - 4.8 K/uL    Mono # 0.43 0.3 - 1 K/uL    Eos # 0.01 <=0.5 K/uL    Baso # 0.03 <=0.2 K/uL    Imm Grans # 0.01 0.00 - 0.04 K/uL   Ethanol    Collection Time: 06/30/25  4:19 AM   Result Value Ref Range     Alcohol, Serum <10 <10 mg/dL   POCT glucose    Collection Time: 06/30/25  7:42 AM   Result Value Ref Range    POCT Glucose 92 70 - 110 mg/dL   POCT urine pregnancy    Collection Time: 06/30/25  8:22 AM   Result Value Ref Range    POC Preg Test, Ur Negative Negative     Acceptable Yes    Urinalysis, Reflex to Urine Culture Urine, Clean Catch    Collection Time: 06/30/25  8:23 AM    Specimen: Urine, Clean Catch   Result Value Ref Range    Color, UA Yellow Straw, Lisa, Yellow, Light-Orange    Appearance, UA Clear Clear    pH, UA 5.0 5.0 - 8.0    Spec Grav UA 1.020 1.005 - 1.030    Protein, UA Trace (A) Negative    Glucose, UA Negative Negative    Ketones, UA 3+ (A) Negative    Bilirubin, UA Negative Negative    Blood, UA Negative Negative    Nitrites, UA Negative Negative    Urobilinogen, UA Negative <2.0 EU/dL    Leukocyte Esterase, UA Negative Negative   Toxicology Screen, Urine    Collection Time: 06/30/25  8:23 AM   Result Value Ref Range    Alcohol, Urine <10 <10 mg/dL    Benzodiazepine, Urine Presumptive Positive (A) Negative    Methadone, Urine Negative Negative    Cocaine, Urine Negative Negative    Opiates, Urine Negative Negative    Barbiturates, Urine Negative Negative    Amphetamines, Urine Negative Negative    THC Presumptive Positive (A) Negative    Phencyclidine, Urine Negative Negative    Urine Creatinine 125.0 15.0 - 325.0 mg/dL     Imaging Results              CT Head Without Contrast (Final result)  Result time 06/30/25 09:05:55      Final result by George Giang MD (06/30/25 09:05:55)                   Impression:      No evidence of acute intracranial pathology.    Electronically signed by resident: George Saldana  Date:    06/30/2025  Time:    08:51    Electronically signed by: George Giang  Date:    06/30/2025  Time:    09:05               Narrative:    EXAMINATION:  CT HEAD WITHOUT CONTRAST    CLINICAL HISTORY:  Delirium;    TECHNIQUE:  Low dose axial CT images obtained  throughout the head without the use of intravenous contrast.  Axial, sagittal and coronal reconstructions were performed.    COMPARISON:  Report of CT head 04/17/2024 (images unavailable)    FINDINGS:  Intracranial compartment:    Ventricles and sulci are normal in size for age without evidence of hydrocephalus.    No parenchymal  hemorrhage, edema, mass effect or major vascular distribution infarct.    Partially empty sella configuration.  No extra-axial blood or fluid collections.    Skull/extracranial contents (limited evaluation):    No displaced calvarial fracture.    The mastoid air cells and visualized paranasal sinuses are essentially clear.

## 2025-06-30 NOTE — ED NOTES
Patient escorted to [facility] by ED tech and security personnel. 1 personal belongings bag, cane, and personal wheelchair given to EMS.

## 2025-06-30 NOTE — ED NOTES
Code watch paged overhead. Pt in hospital gown for now due to patient having an enema and trying to use the restroom. Pt will be moved to a different room and changed into paper scrubs when she is done.

## 2025-06-30 NOTE — PLAN OF CARE
NITA Encounter Summary:  SW met with the patient at bedside. Patient was alert but demonstrated soft, whispered speech. During the encounter, patient reported an incident earlier in the day where she locked herself out of her home. She initially stated she waited outside for her mother to arrive with groceries and an extra key. However, patient later reported uncertainty, stating she believed she may have locked herself out twice in the same day, but could not clearly remember the sequence of events.  Mental Status & Thought Process:  Patient's timeline was disorganized and inconsistent, suggesting possible confusion or impaired memory. She also reported hearing voices while outside, but denied current auditory hallucinations during the interview. Patient appears to be experiencing difficulties with judgment and insight, particularly in relation to personal safety and decision-making regarding when to leave her home.  History & Risk Factors:  Patient acknowledged this was not the first time she has locked herself out, yet no preventative steps appear to have been taken previously. She has a known psychiatric history, which may be contributing to her impaired functioning. Medication compliance is unclear and needs further assessment.  Support & Recommendations:  Patient reported that during transport to the facility, she discussed with paramedics possible solutions to prevent future incidents. She expressed willingness to wear a key around her neck and to obtain a Life Alert button for emergencies. Collateral information from a family member, particularly her mother, is needed to clarify the events and assess support and oversight at home. Evaluation of medication adherence is also recommended.  Plan:  Obtain collateral from family (mother) regarding recent events and baseline functioning.  Assess medication adherence.  Explore eligibility and support for Life Alert system and safe key access solutions.  Recommend  further psychiatric evaluation and possible case management referral for ongoing support.  ALYSSA Ta, MSW-LMSW  Medical Social Worker/  ER Department

## 2025-06-30 NOTE — ED TRIAGE NOTES
Val Gil, a 44 y.o. female presents to the ED w/ complaint of altered mental status. Pt reports increased confusion over the last day or two. Pt states she was locked out of her house twice today. Pt reports after being locked out the first time, her mom came by to drop off groceries so she was able to get back in the home. Pt states then she found herself locked out of the house again for the second time, to which she was unable to call anyone for help. Pt reports getting herself out of the wheelchair and laying on the ground in the direct sun light with no shade. Pt reports possibly talking with people who are not there but unsure if she is really hallucinating or not. Pt also reports constipation. Pt is AAOx4.     Triage note:  Chief Complaint   Patient presents with    Fall     Fall from wheelchair during day yesterday and has been on ground outside since. Neighbor called EMS. GCS 15 at this time.      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

## 2025-06-30 NOTE — ED PROVIDER NOTES
Encounter Date: 6/30/2025       History     Chief Complaint   Patient presents with    Fall     Fall from wheelchair during day yesterday and has been on ground outside since. Neighbor called EMS. GCS 15 at this time.      HPI  44-year-old female with past medical history as noted below, coming in found lying on the ground outside.  She states that she left her home and does not have the keys and got locked out so she was waiting outside for the entire day eventually overheated and lay down in the grass to help herself cool off.  She did get some water at some point from a neighbor.  Somebody saw her lying there and eventually called EMS who brought her in.    Initially she has no active complaints.  She denies headache, chest pain, shortness breath, abdominal pain, extremity pain.    She denies actual falls on the ground she says she put herself on the ground to cool off.    Of note she had a recent episode on 06/13 with a similar presentation, although last time she said she was evicted from where she lives, and was outside for 2 days was found to be psychotic and medically cleared and had a inpatient psychiatric admission.  Patient acknowledges this when asked about it and says the last time she was outside for so long as she became delirious.  She says she has a 10 day admission in the psych hospital.    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    Anxiety     Bone cancer     Depression      Past Surgical History:   Procedure Laterality Date    LEG AMPUTATION AT HIP Left 7/2015     Family History   Problem Relation Name Age of Onset    Cancer Paternal Uncle great uncle         lung    Cancer Maternal Grandmother          breast    Heart attack Maternal Grandmother      Arthritis Maternal Grandmother      Heart attack  Maternal Grandfather      Arthritis Maternal Grandfather      Diabetes Paternal Grandmother      Hypertension Paternal Grandfather      Heart attack Paternal Grandfather      No Known Problems Father       Social History[1]  Review of Systems    Physical Exam     Initial Vitals [06/30/25 0210]   BP Pulse Resp Temp SpO2   133/79 96 18 98.3 °F (36.8 °C) 99 %      MAP       --         Physical Exam    Physical Exam:  CONSTITUTIONAL: Well developed, well nourished, in no acute distress.  HENT: Normocephalic, atraumatic    EYES: Sclerae anicteric   NECK: Supple, no thyroid enlargement  CARDIOVASCULAR: Regular rate and rhythm without any murmurs, gallops, rubs.  RESPIRATORY: Speaking in full sentences. Breathing comfortably. Auscultation of the lungs revealed normal breath sounds b/l, no wheezing, no rales, no rhonchi.  ABDOMEN: Soft and nontender, no masses, no rebound or guarding   RECTAL:  She has some hard small pieces of stool in her rectum with no large impaction easily movable.   NEUROLOGIC: Alert, interacting normally. No facial droop. Voice is clear. Speech is fluent.  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  Otherwise no deformity, normal range motion throughout.  There is no C, T or L-spine tenderness.  SKIN:  Sunburn on the face and arms, superficial, no partial-thickness or deep burns.  Warm and dry. No visible rash on exposed areas of skin.    Psych:  Flat affect, disorganized thinking with difficulty recounting events of the day today, changing stories, difficult to follow timelines.       ED Course   Procedures  Labs Reviewed   COMPREHENSIVE METABOLIC PANEL - Abnormal       Result Value    Sodium 139      Potassium 3.3 (*)     Chloride 101      CO2 22 (*)     Glucose 56 (*)     BUN 15      Creatinine 0.7      Calcium 9.9      Protein Total 8.2      Albumin 4.5      Bilirubin Total 0.3      ALP 70      AST 16      ALT 9 (*)     Anion Gap 16      eGFR >60     CBC WITH DIFFERENTIAL -  Abnormal    WBC 4.11      RBC 4.26      HGB 12.2      HCT 36.5 (*)     MCV 86      MCH 28.6      MCHC 33.4      RDW 14.6 (*)     Platelet Count 334      MPV 9.4      Nucleated RBC 0      Neut % 62.4      Lymph % 26.0      Mono % 10.5      Eos % 0.2      Basophil % 0.7      Imm Grans % 0.2      Neut # 2.56      Lymph # 1.07      Mono # 0.43      Eos # 0.01      Baso # 0.03      Imm Grans # 0.01     MAGNESIUM - Normal    Magnesium  1.6     PHOSPHORUS - Normal    Phosphorus Level 4.2     CK - Normal    CPK 50     ALCOHOL,MEDICAL (ETHANOL) - Normal    Alcohol, Serum <10     CBC W/ AUTO DIFFERENTIAL    Narrative:     The following orders were created for panel order CBC auto differential.  Procedure                               Abnormality         Status                     ---------                               -----------         ------                     CBC with Differential[[id number]]       Abnormal            Final result                 Please view results for these tests on the individual orders.   GOLD TOP HOLD    Extra Tube Hold for add-ons.     URINALYSIS, REFLEX TO URINE CULTURE   TOXICOLOGY SCREEN, URINE, RANDOM (COMPLIANCE)   EXTRA TUBES    Narrative:     The following orders were created for panel order EXTRA TUBES.  Procedure                               Abnormality         Status                     ---------                               -----------         ------                     Lavender Top Hold[[id number]]                                                          Gold Top Hold[[id number]]                                   Final result               Gold Top Hold[[id number]]                                                                Please view results for these tests on the individual orders.   LAVENDER TOP HOLD   GOLD TOP HOLD   POCT URINE PREGNANCY   POCT GLUCOSE    POCT Glucose 92     POCT GLUCOSE MONITORING CONTINUOUS          Imaging Results    None          Medications    polyethylene glycol packet 17 g (17 g Oral Not Given 6/30/25 3947)   sodium chloride 0.9% bolus 1,000 mL 1,000 mL (0 mLs Intravenous Stopped 6/30/25 4976)   ondansetron disintegrating tablet 4 mg (4 mg Oral Given 6/30/25 7450)     Medical Decision Making  Amount and/or Complexity of Data Reviewed  External Data Reviewed: notes.  Labs: ordered.  Radiology: ordered.    Risk  OTC drugs.  Prescription drug management.      44-year-old female with past history as noted coming in being on the ground for several hours outside in the heat.  She says she put herself there after being locked out of her home.  Recent visit for similar although that time it was for 2 days.  Currently no complaints except for sunburn.    Will undertake screening labs to look for metabolic hematologic abnormalities, give patient food and water, IV fluids.    Will have social work evaluate as this seems to be the 2nd episode where she got locked out of her house to see if she needs any help at home and or possibly notify adult protective Services?  I am unable to see or assess the psychiatric documentation from her recent admission.    Attempted to call mom for collateral information but no answer.  No other numbers in the chart.    Disposition based on ED workup and patient's pathology.    Update:  Later in the ED course she does complain of chronic constipation and says she is impacted.  Abdominal exam is unremarkable, rectal exam with some hard stool but no large stool ball multiple small little pieces consistent with constipation.  She has a CT scan 2 weeks ago which showed constipation without obstruction, impaction, Seroquel colitis.    Ordered MiraLax as well as enema.  She refused the MiraLax.  Given enema she says she produced a small bowel movement.  Repeat abdominal exam is benign, continue bowel regimen.    Seen by social work who was concerned for disorganized thinking, gravely disabled as she does not seem to be able to take care  of herself some continues to have trouble finding her way back into her own house.  She later says that she got locked out twice today with 1 time her mom leading her back in and subsequently getting locked out again.  It is unclear what is causing this.    Will pec patient for grave disability, inability to take care of herself, psychiatric evaluation.  She denies HI or SI.  She was made aware of the pec.    Given unusual presentation, 2nd episode despite lack of focal neurologic deficits will obtain CT brain to look for any dangerous intracranial pathology.    She did have a mildly low glucose, will give juice and recheck glucose.    Update:  Seen after normalization of glucose, clinically there is no change in her status.  She does complain of nausea will give Zofran then continue bowel regimen.    Signed out to morning team pending CT and urine studies if these are unremarkable anticipate medical clearance for inpatient psychiatric evaluation.                                      Clinical Impression:  Final diagnoses:  [W19.XXXA] Fall  [K59.00] Constipation, unspecified constipation type (Primary)  [R53.81] Disability affecting daily living                       [1]   Social History  Tobacco Use    Smoking status: Every Day     Current packs/day: 1.00     Types: Cigarettes    Smokeless tobacco: Never   Substance Use Topics    Alcohol use: No    Drug use: No        Brandan Chong MD  06/30/25 0758

## 2025-07-01 LAB
HOLD SPECIMEN: NORMAL
OHS QRS DURATION: 92 MS
OHS QTC CALCULATION: 508 MS

## 2025-07-02 PROBLEM — R63.8 INADEQUATE ORAL INTAKE: Status: ACTIVE | Noted: 2025-07-02

## 2025-07-06 ENCOUNTER — HOSPITAL ENCOUNTER (EMERGENCY)
Facility: HOSPITAL | Age: 45
Discharge: HOME | End: 2025-07-07
Attending: STUDENT IN AN ORGANIZED HEALTH CARE EDUCATION/TRAINING PROGRAM
Payer: MEDICAID
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
## 2025-07-06 PROCEDURE — 99285 EMERGENCY DEPT VISIT HI MDM: CPT | Mod: 25

## 2025-07-06 PROCEDURE — 25000003 PHARM REV CODE 250: Performed by: STUDENT IN AN ORGANIZED HEALTH CARE EDUCATION/TRAINING PROGRAM

## 2025-07-06 RX ORDER — ACETAMINOPHEN 500 MG
1000 TABLET ORAL
Status: COMPLETED | OUTPATIENT
Start: 2025-07-06 | End: 2025-07-06

## 2025-07-06 RX ADMIN — ACETAMINOPHEN 1000 MG: 500 TABLET ORAL at 09:07

## 2025-07-07 VITALS
HEART RATE: 76 BPM | TEMPERATURE: 98 F | SYSTOLIC BLOOD PRESSURE: 104 MMHG | WEIGHT: 110 LBS | OXYGEN SATURATION: 98 % | RESPIRATION RATE: 18 BRPM | HEIGHT: 65 IN | BODY MASS INDEX: 18.33 KG/M2 | DIASTOLIC BLOOD PRESSURE: 66 MMHG

## 2025-07-07 PROCEDURE — 25000003 PHARM REV CODE 250: Performed by: STUDENT IN AN ORGANIZED HEALTH CARE EDUCATION/TRAINING PROGRAM

## 2025-07-07 RX ORDER — GABAPENTIN 300 MG/1
300 CAPSULE ORAL 3 TIMES DAILY
Status: DISCONTINUED | OUTPATIENT
Start: 2025-07-07 | End: 2025-07-07 | Stop reason: HOSPADM

## 2025-07-07 RX ORDER — TIZANIDINE 2 MG/1
4 TABLET ORAL
Status: COMPLETED | OUTPATIENT
Start: 2025-07-07 | End: 2025-07-07

## 2025-07-07 RX ADMIN — TIZANIDINE 4 MG: 2 TABLET ORAL at 01:07

## 2025-07-07 RX ADMIN — QUETIAPINE FUMARATE 150 MG: 100 TABLET ORAL at 01:07

## 2025-07-07 NOTE — ED NOTES
Patient presents to ED with CC of pain to head, back, and neck secondary to a fall that occurred PTA. Patient AAOX4, respirations even and unlabored, an din no acute distress at this time. Patient denies LOC or taking blood thinners. Patient connected to pulse-ox at this time. Sitter at bedside at this time.

## 2025-07-07 NOTE — ED NOTES
Report given to [facility]. Patient did not come with any belongings. CEC and discharge instructions [facility] at this time.

## 2025-07-07 NOTE — ED PROVIDER NOTES
NAME:  Val Gil  CSN:     [id number]  MRN:    [id number]  ADMIT DATE: 7/6/2025        eMERGENCY dEPARTMENT eNCOUnter    CHIEF COMPLAINT    Chief Complaint   Patient presents with    Fall     [id number] brought in a 45 y/o female from [facility]  on a CEC with c/o a fall.  Slip and fall while attempting to put on shoe/sock.hematoma to back of head.  C/o entire body pain when asked what hurst her.  No LOC, no blood thinners.  Right AKA.  Hx of bone CA. C-collar in place.       HPI    Val Gil is a 44 y.o. female with a past medical history of  has a past medical history of Angiosarcoma, Angiosarcoma, Anxiety, Bone cancer, and Depression.     she presents to the ED due to fall at her psychiatric facility.  She was attempting to put a sock on on the side of the bed when she slipped.  Fell directly forward hitting her forehead and then back of her head.  Does note some pain to the middle of her neck as well.  C-collar in place on arrival.  Did not lose consciousness.  No new numbness.  Does note weakness to the left lower extremity.  Is wheelchair-bound at baseline.  No other injuries.      The history is provided by the patient and the EMS personnel.          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    Anxiety     Bone cancer     Depression        SURGICAL HISTORY    Past Surgical History:   Procedure Laterality Date    LEG AMPUTATION AT HIP Left 7/2015       FAMILY HISTORY    Family History   Problem Relation Name Age of Onset    Cancer Paternal Uncle great uncle         lung    Cancer Maternal Grandmother          breast    Heart attack Maternal Grandmother      Arthritis Maternal Grandmother      Heart attack Maternal Grandfather      Arthritis Maternal Grandfather      Diabetes Paternal Grandmother      Hypertension Paternal Grandfather      Heart attack  Paternal Grandfather      No Known Problems Father         SOCIAL HISTORY    Social History     Socioeconomic History    Marital status: Single   Tobacco Use    Smoking status: Every Day     Current packs/day: 1.00     Types: Cigarettes    Smokeless tobacco: Never   Substance and Sexual Activity    Alcohol use: No    Drug use: No    Sexual activity: Not Currently   Other Topics Concern    Patient feels they ought to cut down on drinking/drug use No    Patient annoyed by others criticizing their drinking/drug use No    Patient has felt bad or guilty about drinking/drug use No    Patient has had a drink/used drugs as an eye opener in the AM No     Social Drivers of Health     Financial Resource Strain: Patient Unable To Answer (6/13/2025)    Overall Financial Resource Strain (CARDIA)     Difficulty of Paying Living Expenses: Patient unable to answer   Food Insecurity: Patient Unable To Answer (6/13/2025)    Hunger Vital Sign     Worried About Running Out of Food in the Last Year: Patient unable to answer     Ran Out of Food in the Last Year: Patient unable to answer   Transportation Needs: Patient Unable To Answer (6/13/2025)    PRAPARE - Transportation     Lack of Transportation (Medical): Patient unable to answer     Lack of Transportation (Non-Medical): Patient unable to answer   Physical Activity: Patient Unable To Answer (6/13/2025)    Exercise Vital Sign     Days of Exercise per Week: Patient unable to answer     Minutes of Exercise per Session: Patient unable to answer   Stress: Patient Unable To Answer (6/13/2025)    [employer] - Occupational Stress Questionnaire     Feeling of Stress : Patient unable to answer   Housing Stability: Patient Unable To Answer (6/13/2025)    Housing Stability Vital Sign     Unable to Pay for Housing in the Last Year: Patient unable to answer     Homeless in the Last Year: Patient unable to answer       MEDICATIONS  Current Outpatient Medications  "  Medication Instructions    buprenorphine HCl (SUBUTEX) 2 mg Subl TAKE one TABLET UNDER TONGUE, takes 8 mg TID    diazePAM (VALIUM) 10 MG Tab TAKE 1 TABLET BY MOUTH DAILY AS NEEDED FOR ANXIETY    famotidine (PEPCID) 20 mg, Oral, Every morning    gabapentin (NEURONTIN) 300 MG capsule gabapentin 300 mg capsule   Take 1 capsule 3 times a day by oral route.    lamoTRIgine (LAMICTAL) 25 MG tablet TAKE THREE TABLET BY MOUTH EVERY MORNING    LORazepam (ATIVAN) 1 mg, Oral, 3 times daily PRN    ondansetron (ZOFRAN-ODT) 8 mg, Oral, Daily,      promethazine (PHENERGAN) 25 mg, Oral, Daily PRN    QUEtiapine (SEROQUEL) 100 MG Tab one AT bedtime takes 200 mg at night    QUEtiapine (SEROQUEL) 25 MG Tab TAKE 2-3 BY MOUTH AS NEEDED AT NIGHT    senna-docusate (PERICOLACE) 8.6-50 mg per tablet 2 tablets, Oral, Daily    valACYclovir (VALTREX) 500 MG tablet Valtrex 500 mg tablet   Take 1 tablet twice a day by oral route for 5 days.       ALLERGIES    Review of patient's allergies indicates:   Allergen Reactions    Ibuprofen Other (See Comments)         REVIEW OF SYSTEMS   Review of Systems       PHYSICAL EXAM    Reviewed Triage Note    VITAL SIGNS:   ED Triage Vitals [07/06/25 2109]   Encounter Vitals Group      BP (!) 162/106      Girls Systolic BP Percentile       Girls Diastolic BP Percentile       Boys Systolic BP Percentile       Boys Diastolic BP Percentile       Pulse 90      Resp 18      Temp 98.1 °F (36.7 °C)      Temp src       SpO2 98 %      Weight 110 lb      Height 5' 5"      Head Circumference       Peak Flow       Pain Score       Pain Loc       Pain Education       Exclude from Growth Chart        Patient Vitals for the past 24 hrs:   BP Temp Pulse Resp SpO2 Height Weight   07/07/25 0002 (!) 158/94 -- 80 -- 98 % -- --   07/06/25 2332 (!) 156/103 -- 85 -- 99 % -- --   07/06/25 2330 (!) 155/96 -- 90 -- 99 % -- --   07/06/25 2232 (!) 142/92 -- 79 -- 97 % -- --   07/06/25 2202 (!) 159/95 -- 86 -- 98 % -- --   07/06/25 2134 " "(!) 173/106 -- 78 -- 98 % -- --   07/06/25 2109 (!) 162/106 98.1 °F (36.7 °C) 90 18 98 % 5' 5" (1.651 m) 49.9 kg (110 lb)       Physical Exam    Nursing note and vitals reviewed.  Constitutional: She appears well-developed and well-nourished.   HENT:   Head: Normocephalic and atraumatic.   Eyes: EOM are normal. Pupils are equal, round, and reactive to light.   Neck: Neck supple.   Normal range of motion.  Cardiovascular:  Normal rate and regular rhythm.           Pulmonary/Chest: Breath sounds normal. No respiratory distress. She exhibits no tenderness.   Abdominal: Abdomen is soft. There is no abdominal tenderness.   Musculoskeletal:         General: Normal range of motion.      Cervical back: Normal range of motion and neck supple.      Comments: Status post right hip disarticulation, she does have tenderness to C4-C5 in the midline.  No tenderness to the thoracic or lumbar spine.  4/5 strength of the left lower extremity.  Intact distal pulses.  No tenderness to palpation of all extremities.  Pelvis is stable.     Neurological: She is alert and oriented to person, place, and time.   Skin: Skin is warm and dry.   Psychiatric: She has a normal mood and affect.        EKG     Interpreted by EM physician if performed:               LABS  Pertinent labs reviewed. (See chart for details)   Labs Reviewed - No data to display      RADIOLOGY          Imaging Results              CT Head Without Contrast (Final result)  Result time 07/06/25 23:52:10      Final result by Palma Starr MD (07/06/25 23:52:10)                   Impression:      No acute intracranial abnormality or fracture.      Electronically signed by: Palma Starr  Date:    07/06/2025  Time:    23:52               Narrative:    EXAMINATION:  CT HEAD WITHOUT CONTRAST    CLINICAL HISTORY:  Facial trauma, blunt;    TECHNIQUE:  Low dose axial images were obtained through the head.  Coronal and sagittal reformations were also performed. Contrast was not " administered.    COMPARISON:  CT brain 06/30/2025    FINDINGS:  There is no evidence of acute intra or extra-axial hemorrhage or hematoma.  The gray-white matter junction differentiation is intact.  There is no mass effect/midline shift.  The imaged paranasal sinuses, mastoid air cells and middle ears appear clear.  Bony calvarium is intact.  Orbital structures appear symmetric and grossly intact.                                       CT Cervical Spine Without Contrast (Final result)  Result time 07/07/25 00:12:46      Final result by Marco Antonio Starr MD (07/07/25 00:12:46)                   Impression:      No acute fracture or traumatic malalignment identified.      Electronically signed by: Marco Antonio Starr  Date:    07/07/2025  Time:    00:12               Narrative:    EXAMINATION:  CT CERVICAL SPINE WITHOUT CONTRAST    CLINICAL HISTORY:  Neck trauma, midline tenderness (Age 16-64y);    TECHNIQUE:  Noncontrast CT images of the cervical spine spine. Axial, coronal, and sagittal reformatted images were obtained.    COMPARISON:  No relevant comparison studies available at the time of dictation.    FINDINGS:  There is mild reversal of cervical lordosis with spondylosis and disc space narrowing and osteophytes at C5-6.  There is no evidence of fracture, subluxation or swelling.  Central neural canal and neural foramen are patent.  Visceral spaces appear unremarkable.  Cervical cranial and cervicothoracic junction maintained.  Lung apices clear.                                        PROCEDURES    Procedures      ED COURSE & MEDICAL DECISION MAKING    Pertinent Labs & Imaging studies reviewed. (See chart for details and specific orders.)          Summary of review of records:   To ED visits this month for bizarre behavior, psychosis.  Questionable homelessness currently.  Admitted on [facility] for bizarre behavior.    Seen on April 15th by primary care.  History of chronic pain and neuroma of amputation stump on the  right.  Prescription of Zanaflex, Neurontin at that visit.    Most recent lab work done on June 30th- UDS with benzos and THC at that time.  Urinalysis without infection though ketones noted.  CPK within normal limits.  Magnesium within normal limits.  CMP notable for mild hypokalemia of 3.3.  Normal renal function.    Medical Decision Making  Amount and/or Complexity of Data Reviewed  External Data Reviewed: labs and notes.  Radiology: ordered and independent interpretation performed. Decision-making details documented in ED Course.    Risk  OTC drugs.  Prescription drug management.      Val Gil is a 44 y.o. female who presents for ground level fall at her psychiatric facility.  Lost her footing and fell forward from the side of the bed.  And she hit her forehead and then the back of her head.  Denies any other injuries.    Differential includes but is not limited to concussion, ICH, fracture            Medications   QUEtiapine tablet 150 mg (has no administration in time range)   gabapentin capsule 300 mg (has no administration in time range)   tiZANidine tablet 4 mg (has no administration in time range)   acetaminophen tablet 1,000 mg (1,000 mg Oral Given 7/6/25 2124)       ED Course as of 07/07/25 0036   Mon Jul 07, 2025   0010 CT Head Without Contrast  No acute intracranial abnormality or fracture. [HL]   0018 CT Cervical Spine Without Contrast  here is mild reversal of cervical lordosis with spondylosis and disc space narrowing and osteophytes at C5-6.  There is no evidence of fracture, subluxation or swelling.  Central neural canal and neural foramen are patent.  Visceral spaces appear unremarkable.  Cervical cranial and cervicothoracic junction maintained.  Lung apices clear. [HL]      ED Course User Index  [HL] Yesenia Flood, DO       Findings discussed with patient at bedside.  Given her nighttime meds that she missed her facility.  For discharge back to her facility at this time.      FINAL  IMPRESSION    Final diagnoses:  [W19.XXXA] Fall, initial encounter (Primary)       DISPOSITION  Patient discharge in stable condition        ED Prescriptions    None       Follow-up Information    None           DISCLAIMER: This note was prepared with [employer] voice recognition transcription software. Garbled syntax, mangled pronouns, and other bizarre constructions may be attributed to that software system.             Yesenia Flood, DO  07/07/25 0036

## 2025-07-17 ENCOUNTER — HOSPITAL ENCOUNTER (OUTPATIENT)
Facility: HOSPITAL | Age: 45
Discharge: PSYCHIATRIC OR SUBSTANCE USE FACILITY | End: 2025-07-20
Attending: EMERGENCY MEDICINE
Payer: COMMERCIAL

## 2025-07-17 DIAGNOSIS — Z00.8 MEDICAL CLEARANCE FOR PSYCHIATRIC ADMISSION: ICD-10-CM

## 2025-07-17 DIAGNOSIS — R53.1 WEAKNESS: ICD-10-CM

## 2025-07-17 DIAGNOSIS — E87.6 HYPOKALEMIA: ICD-10-CM

## 2025-07-17 DIAGNOSIS — R33.9 URINARY RETENTION: ICD-10-CM

## 2025-07-17 DIAGNOSIS — R44.0 AUDITORY HALLUCINATIONS: Primary | ICD-10-CM

## 2025-07-17 DIAGNOSIS — F41.1 ANXIETY STATE: ICD-10-CM

## 2025-07-17 PROCEDURE — 96375 TX/PRO/DX INJ NEW DRUG ADDON: CPT

## 2025-07-17 PROCEDURE — 96367 TX/PROPH/DG ADDL SEQ IV INF: CPT

## 2025-07-17 PROCEDURE — 82962 GLUCOSE BLOOD TEST: CPT

## 2025-07-17 PROCEDURE — 99285 EMERGENCY DEPT VISIT HI MDM: CPT | Mod: 25

## 2025-07-17 PROCEDURE — 96361 HYDRATE IV INFUSION ADD-ON: CPT

## 2025-07-17 PROCEDURE — 80047 BASIC METABLC PNL IONIZED CA: CPT

## 2025-07-17 PROCEDURE — 96366 THER/PROPH/DIAG IV INF ADDON: CPT

## 2025-07-17 PROCEDURE — 96365 THER/PROPH/DIAG IV INF INIT: CPT

## 2025-07-18 PROBLEM — Z89.611 HX OF AKA (ABOVE KNEE AMPUTATION), RIGHT: Status: ACTIVE | Noted: 2025-07-18

## 2025-07-18 PROBLEM — R44.0 AUDITORY HALLUCINATIONS: Status: ACTIVE | Noted: 2025-07-18

## 2025-07-18 PROBLEM — I10 HYPERTENSION: Status: ACTIVE | Noted: 2025-07-18

## 2025-07-18 PROBLEM — E87.6 HYPOKALEMIA: Status: ACTIVE | Noted: 2025-07-18

## 2025-07-18 LAB
ABSOLUTE EOSINOPHIL (OHS): 0.04 K/UL
ABSOLUTE MONOCYTE (OHS): 0.5 K/UL (ref 0.3–1)
ABSOLUTE NEUTROPHIL COUNT (OHS): 2.38 K/UL (ref 1.8–7.7)
ALBUMIN SERPL BCP-MCNC: 4.6 G/DL (ref 3.5–5.2)
ALP SERPL-CCNC: 67 UNIT/L (ref 40–150)
ALT SERPL W/O P-5'-P-CCNC: 15 UNIT/L (ref 10–44)
AMPHET UR QL SCN: NEGATIVE
ANION GAP (OHS): 16 MMOL/L (ref 8–16)
ANION GAP (OHS): 16 MMOL/L (ref 8–16)
APAP SERPL-MCNC: <3 UG/ML (ref 10–20)
AST SERPL-CCNC: 22 UNIT/L (ref 11–45)
BACTERIA #/AREA URNS AUTO: NORMAL /HPF
BARBITURATE SCN PRESENT UR: NEGATIVE
BASOPHILS # BLD AUTO: 0.02 K/UL
BASOPHILS NFR BLD AUTO: 0.4 %
BENZODIAZ UR QL SCN: ABNORMAL
BILIRUB SERPL-MCNC: 0.6 MG/DL (ref 0.1–1)
BILIRUB UR QL STRIP.AUTO: NEGATIVE
BUN SERPL-MCNC: 10 MG/DL (ref 6–30)
BUN SERPL-MCNC: 5 MG/DL (ref 6–30)
BUN SERPL-MCNC: 6 MG/DL (ref 6–20)
BUN SERPL-MCNC: 8 MG/DL (ref 6–30)
BUN SERPL-MCNC: 9 MG/DL (ref 6–20)
CALCIUM SERPL-MCNC: 9.2 MG/DL (ref 8.7–10.5)
CALCIUM SERPL-MCNC: 9.6 MG/DL (ref 8.7–10.5)
CANNABINOIDS UR QL SCN: ABNORMAL
CHLORIDE SERPL-SCNC: 103 MMOL/L (ref 95–110)
CHLORIDE SERPL-SCNC: 105 MMOL/L (ref 95–110)
CHLORIDE SERPL-SCNC: 105 MMOL/L (ref 95–110)
CHLORIDE SERPL-SCNC: 107 MMOL/L (ref 95–110)
CHLORIDE SERPL-SCNC: 108 MMOL/L (ref 95–110)
CLARITY UR: CLEAR
CO2 SERPL-SCNC: 19 MMOL/L (ref 23–29)
CO2 SERPL-SCNC: 23 MMOL/L (ref 23–29)
COCAINE UR QL SCN: NEGATIVE
COLOR UR AUTO: YELLOW
CREAT SERPL-MCNC: 0.4 MG/DL (ref 0.5–1.4)
CREAT SERPL-MCNC: 0.5 MG/DL (ref 0.5–1.4)
CREAT SERPL-MCNC: 0.7 MG/DL (ref 0.5–1.4)
CREAT UR-MCNC: 183 MG/DL (ref 15–325)
ERYTHROCYTE [DISTWIDTH] IN BLOOD BY AUTOMATED COUNT: 14.8 % (ref 11.5–14.5)
ETHANOL SERPL-MCNC: <10 MG/DL
GFR SERPLBLD CREATININE-BSD FMLA CKD-EPI: >60 ML/MIN/1.73/M2
GFR SERPLBLD CREATININE-BSD FMLA CKD-EPI: >60 ML/MIN/1.73/M2
GLUCOSE SERPL-MCNC: 83 MG/DL (ref 70–110)
GLUCOSE SERPL-MCNC: 90 MG/DL (ref 70–110)
GLUCOSE SERPL-MCNC: 90 MG/DL (ref 70–110)
GLUCOSE SERPL-MCNC: 91 MG/DL (ref 70–110)
GLUCOSE SERPL-MCNC: 91 MG/DL (ref 70–110)
GLUCOSE UR QL STRIP: NEGATIVE
HCT VFR BLD AUTO: 37.8 % (ref 37–48.5)
HCT VFR BLD CALC: 33 %PCV (ref 36–54)
HCT VFR BLD CALC: 34 %PCV (ref 36–54)
HCT VFR BLD CALC: 35 %PCV (ref 36–54)
HGB BLD-MCNC: 13.1 GM/DL (ref 12–16)
HGB UR QL STRIP: NEGATIVE
HYALINE CASTS UR QL AUTO: 0 /LPF (ref 0–1)
IMM GRANULOCYTES # BLD AUTO: 0.02 K/UL (ref 0–0.04)
IMM GRANULOCYTES NFR BLD AUTO: 0.4 % (ref 0–0.5)
KETONES UR QL STRIP: ABNORMAL
LEUKOCYTE ESTERASE UR QL STRIP: NEGATIVE
LYMPHOCYTES # BLD AUTO: 1.6 K/UL (ref 1–4.8)
MAGNESIUM SERPL-MCNC: 1.7 MG/DL (ref 1.6–2.6)
MCH RBC QN AUTO: 28.5 PG (ref 27–31)
MCHC RBC AUTO-ENTMCNC: 34.7 G/DL (ref 32–36)
MCV RBC AUTO: 82 FL (ref 82–98)
METHADONE UR QL SCN: NEGATIVE
MICROSCOPIC COMMENT: NORMAL
NITRITE UR QL STRIP: NEGATIVE
NUCLEATED RBC (/100WBC) (OHS): 0 /100 WBC
OHS QRS DURATION: 86 MS
OHS QTC CALCULATION: 472 MS
OPIATES UR QL SCN: NEGATIVE
PCP UR QL: NEGATIVE
PH UR STRIP: 6 [PH]
PLATELET # BLD AUTO: 454 K/UL (ref 150–450)
PMV BLD AUTO: 10.2 FL (ref 9.2–12.9)
POC IONIZED CALCIUM: 0.92 MMOL/L (ref 1.06–1.42)
POC IONIZED CALCIUM: 1.16 MMOL/L (ref 1.06–1.42)
POC IONIZED CALCIUM: 1.16 MMOL/L (ref 1.06–1.42)
POC TCO2 (MEASURED): 19 MMOL/L (ref 23–29)
POC TCO2 (MEASURED): 20 MMOL/L (ref 23–29)
POC TCO2 (MEASURED): 21 MMOL/L (ref 23–29)
POCT GLUCOSE: 97 MG/DL (ref 70–110)
POTASSIUM BLD-SCNC: 2.9 MMOL/L (ref 3.5–5.1)
POTASSIUM BLD-SCNC: 3.1 MMOL/L (ref 3.5–5.1)
POTASSIUM BLD-SCNC: 7.1 MMOL/L (ref 3.5–5.1)
POTASSIUM SERPL-SCNC: 2.7 MMOL/L (ref 3.5–5.1)
POTASSIUM SERPL-SCNC: 3.1 MMOL/L (ref 3.5–5.1)
PROT SERPL-MCNC: 7.7 GM/DL (ref 6–8.4)
PROT UR QL STRIP: ABNORMAL
RBC # BLD AUTO: 4.59 M/UL (ref 4–5.4)
RBC #/AREA URNS AUTO: 1 /HPF (ref 0–4)
RELATIVE EOSINOPHIL (OHS): 0.9 %
RELATIVE LYMPHOCYTE (OHS): 35.1 % (ref 18–48)
RELATIVE MONOCYTE (OHS): 11 % (ref 4–15)
RELATIVE NEUTROPHIL (OHS): 52.2 % (ref 38–73)
SALICYLATES SERPL-MCNC: <5 MG/DL (ref 15–30)
SAMPLE: ABNORMAL
SODIUM BLD-SCNC: 134 MMOL/L (ref 136–145)
SODIUM BLD-SCNC: 140 MMOL/L (ref 136–145)
SODIUM BLD-SCNC: 142 MMOL/L (ref 136–145)
SODIUM SERPL-SCNC: 142 MMOL/L (ref 136–145)
SODIUM SERPL-SCNC: 142 MMOL/L (ref 136–145)
SP GR UR STRIP: 1.02
SQUAMOUS #/AREA URNS AUTO: <1 /HPF
TSH SERPL-ACNC: 0.57 UIU/ML (ref 0.4–4)
UROBILINOGEN UR STRIP-ACNC: NEGATIVE EU/DL
WBC # BLD AUTO: 4.56 K/UL (ref 3.9–12.7)
WBC #/AREA URNS AUTO: 1 /HPF (ref 0–5)

## 2025-07-18 PROCEDURE — 83735 ASSAY OF MAGNESIUM: CPT

## 2025-07-18 PROCEDURE — 80143 DRUG ASSAY ACETAMINOPHEN: CPT

## 2025-07-18 PROCEDURE — 96376 TX/PRO/DX INJ SAME DRUG ADON: CPT

## 2025-07-18 PROCEDURE — 80053 COMPREHEN METABOLIC PANEL: CPT

## 2025-07-18 PROCEDURE — 81003 URINALYSIS AUTO W/O SCOPE: CPT

## 2025-07-18 PROCEDURE — 93005 ELECTROCARDIOGRAM TRACING: CPT

## 2025-07-18 PROCEDURE — 85025 COMPLETE CBC W/AUTO DIFF WBC: CPT

## 2025-07-18 PROCEDURE — 63600175 PHARM REV CODE 636 W HCPCS

## 2025-07-18 PROCEDURE — 82310 ASSAY OF CALCIUM: CPT

## 2025-07-18 PROCEDURE — 25000003 PHARM REV CODE 250: Performed by: STUDENT IN AN ORGANIZED HEALTH CARE EDUCATION/TRAINING PROGRAM

## 2025-07-18 PROCEDURE — 63600175 PHARM REV CODE 636 W HCPCS: Performed by: EMERGENCY MEDICINE

## 2025-07-18 PROCEDURE — 25000003 PHARM REV CODE 250

## 2025-07-18 PROCEDURE — 93010 ELECTROCARDIOGRAM REPORT: CPT | Mod: ,,, | Performed by: INTERNAL MEDICINE

## 2025-07-18 PROCEDURE — 63600175 PHARM REV CODE 636 W HCPCS: Performed by: STUDENT IN AN ORGANIZED HEALTH CARE EDUCATION/TRAINING PROGRAM

## 2025-07-18 PROCEDURE — G0378 HOSPITAL OBSERVATION PER HR: HCPCS

## 2025-07-18 PROCEDURE — 63600175 PHARM REV CODE 636 W HCPCS: Performed by: PHYSICIAN ASSISTANT

## 2025-07-18 PROCEDURE — 80179 DRUG ASSAY SALICYLATE: CPT

## 2025-07-18 PROCEDURE — 25000003 PHARM REV CODE 250: Performed by: PHYSICIAN ASSISTANT

## 2025-07-18 PROCEDURE — 80307 DRUG TEST PRSMV CHEM ANLYZR: CPT

## 2025-07-18 PROCEDURE — 82077 ASSAY SPEC XCP UR&BREATH IA: CPT

## 2025-07-18 PROCEDURE — 84443 ASSAY THYROID STIM HORMONE: CPT

## 2025-07-18 RX ORDER — HALOPERIDOL LACTATE 5 MG/ML
2.5 INJECTION, SOLUTION INTRAMUSCULAR
Status: DISCONTINUED | OUTPATIENT
Start: 2025-07-18 | End: 2025-07-18

## 2025-07-18 RX ORDER — DICYCLOMINE HYDROCHLORIDE 10 MG/1
10 CAPSULE ORAL 4 TIMES DAILY PRN
Status: DISCONTINUED | OUTPATIENT
Start: 2025-07-18 | End: 2025-07-20 | Stop reason: HOSPADM

## 2025-07-18 RX ORDER — POTASSIUM CHLORIDE 20 MEQ/1
20 TABLET, EXTENDED RELEASE ORAL
Status: COMPLETED | OUTPATIENT
Start: 2025-07-18 | End: 2025-07-18

## 2025-07-18 RX ORDER — PRAZOSIN HYDROCHLORIDE 1 MG/1
2 CAPSULE ORAL NIGHTLY
Status: DISCONTINUED | OUTPATIENT
Start: 2025-07-18 | End: 2025-07-20 | Stop reason: HOSPADM

## 2025-07-18 RX ORDER — MAGNESIUM SULFATE HEPTAHYDRATE 40 MG/ML
2 INJECTION, SOLUTION INTRAVENOUS ONCE
Status: COMPLETED | OUTPATIENT
Start: 2025-07-18 | End: 2025-07-18

## 2025-07-18 RX ORDER — GABAPENTIN 300 MG/1
300 CAPSULE ORAL 3 TIMES DAILY
Status: DISCONTINUED | OUTPATIENT
Start: 2025-07-18 | End: 2025-07-20 | Stop reason: HOSPADM

## 2025-07-18 RX ORDER — CLONIDINE HYDROCHLORIDE 0.1 MG/1
0.1 TABLET ORAL EVERY 6 HOURS PRN
Status: DISCONTINUED | OUTPATIENT
Start: 2025-07-18 | End: 2025-07-20 | Stop reason: HOSPADM

## 2025-07-18 RX ORDER — HALOPERIDOL LACTATE 5 MG/ML
5 INJECTION, SOLUTION INTRAMUSCULAR
Status: DISCONTINUED | OUTPATIENT
Start: 2025-07-18 | End: 2025-07-18

## 2025-07-18 RX ORDER — MIDAZOLAM HYDROCHLORIDE 1 MG/ML
2 INJECTION, SOLUTION INTRAMUSCULAR; INTRAVENOUS
Status: COMPLETED | OUTPATIENT
Start: 2025-07-18 | End: 2025-07-18

## 2025-07-18 RX ORDER — DIAZEPAM 5 MG/1
10 TABLET ORAL 2 TIMES DAILY PRN
Status: DISCONTINUED | OUTPATIENT
Start: 2025-07-18 | End: 2025-07-20 | Stop reason: HOSPADM

## 2025-07-18 RX ORDER — LOPERAMIDE HYDROCHLORIDE 2 MG/1
2 CAPSULE ORAL 4 TIMES DAILY PRN
Status: DISCONTINUED | OUTPATIENT
Start: 2025-07-18 | End: 2025-07-20 | Stop reason: HOSPADM

## 2025-07-18 RX ORDER — POTASSIUM CHLORIDE 7.45 MG/ML
10 INJECTION INTRAVENOUS
Status: COMPLETED | OUTPATIENT
Start: 2025-07-18 | End: 2025-07-18

## 2025-07-18 RX ORDER — TIZANIDINE 4 MG/1
4 TABLET ORAL EVERY 8 HOURS PRN
Status: DISCONTINUED | OUTPATIENT
Start: 2025-07-18 | End: 2025-07-20 | Stop reason: HOSPADM

## 2025-07-18 RX ORDER — TALC
6 POWDER (GRAM) TOPICAL NIGHTLY PRN
Status: DISCONTINUED | OUTPATIENT
Start: 2025-07-18 | End: 2025-07-20 | Stop reason: HOSPADM

## 2025-07-18 RX ORDER — ONDANSETRON 8 MG/1
8 TABLET, ORALLY DISINTEGRATING ORAL EVERY 6 HOURS PRN
Status: DISCONTINUED | OUTPATIENT
Start: 2025-07-18 | End: 2025-07-20 | Stop reason: HOSPADM

## 2025-07-18 RX ORDER — ACETAMINOPHEN 325 MG/1
650 TABLET ORAL EVERY 6 HOURS PRN
Status: DISCONTINUED | OUTPATIENT
Start: 2025-07-18 | End: 2025-07-20 | Stop reason: HOSPADM

## 2025-07-18 RX ORDER — POLYETHYLENE GLYCOL 3350 17 G/17G
17 POWDER, FOR SOLUTION ORAL DAILY
Status: DISCONTINUED | OUTPATIENT
Start: 2025-07-18 | End: 2025-07-20 | Stop reason: HOSPADM

## 2025-07-18 RX ORDER — ALUMINUM HYDROXIDE, MAGNESIUM HYDROXIDE, AND SIMETHICONE 1200; 120; 1200 MG/30ML; MG/30ML; MG/30ML
30 SUSPENSION ORAL EVERY 6 HOURS PRN
Status: DISCONTINUED | OUTPATIENT
Start: 2025-07-18 | End: 2025-07-20 | Stop reason: HOSPADM

## 2025-07-18 RX ORDER — AMLODIPINE BESYLATE 5 MG/1
5 TABLET ORAL DAILY
Status: DISCONTINUED | OUTPATIENT
Start: 2025-07-18 | End: 2025-07-18

## 2025-07-18 RX ORDER — PROCHLORPERAZINE EDISYLATE 5 MG/ML
5 INJECTION INTRAMUSCULAR; INTRAVENOUS EVERY 6 HOURS PRN
Status: DISCONTINUED | OUTPATIENT
Start: 2025-07-18 | End: 2025-07-20 | Stop reason: HOSPADM

## 2025-07-18 RX ORDER — PANTOPRAZOLE SODIUM 40 MG/1
40 TABLET, DELAYED RELEASE ORAL DAILY
Status: DISCONTINUED | OUTPATIENT
Start: 2025-07-18 | End: 2025-07-20 | Stop reason: HOSPADM

## 2025-07-18 RX ORDER — SODIUM CHLORIDE 9 MG/ML
INJECTION, SOLUTION INTRAVENOUS CONTINUOUS
Status: DISCONTINUED | OUTPATIENT
Start: 2025-07-18 | End: 2025-07-18

## 2025-07-18 RX ORDER — HYDROXYZINE PAMOATE 25 MG/1
25 CAPSULE ORAL EVERY 8 HOURS PRN
Status: DISCONTINUED | OUTPATIENT
Start: 2025-07-18 | End: 2025-07-20 | Stop reason: HOSPADM

## 2025-07-18 RX ORDER — POTASSIUM CHLORIDE 7.45 MG/ML
10 INJECTION INTRAVENOUS
Status: COMPLETED | OUTPATIENT
Start: 2025-07-18 | End: 2025-07-19

## 2025-07-18 RX ORDER — AMLODIPINE BESYLATE 5 MG/1
5 TABLET ORAL DAILY
Status: DISCONTINUED | OUTPATIENT
Start: 2025-07-19 | End: 2025-07-20 | Stop reason: HOSPADM

## 2025-07-18 RX ORDER — SUCRALFATE 1 G/1
1 TABLET ORAL
Status: DISCONTINUED | OUTPATIENT
Start: 2025-07-18 | End: 2025-07-20 | Stop reason: HOSPADM

## 2025-07-18 RX ORDER — POTASSIUM CHLORIDE 7.45 MG/ML
20 INJECTION INTRAVENOUS
Status: DISCONTINUED | OUTPATIENT
Start: 2025-07-18 | End: 2025-07-18

## 2025-07-18 RX ORDER — QUETIAPINE FUMARATE 200 MG/1
200 TABLET, FILM COATED ORAL NIGHTLY
Status: DISCONTINUED | OUTPATIENT
Start: 2025-07-18 | End: 2025-07-20 | Stop reason: HOSPADM

## 2025-07-18 RX ADMIN — PANTOPRAZOLE SODIUM 40 MG: 40 TABLET, DELAYED RELEASE ORAL at 03:07

## 2025-07-18 RX ADMIN — POTASSIUM CHLORIDE 10 MEQ: 7.46 INJECTION, SOLUTION INTRAVENOUS at 09:07

## 2025-07-18 RX ADMIN — QUETIAPINE FUMARATE 200 MG: 200 TABLET ORAL at 09:07

## 2025-07-18 RX ADMIN — DIAZEPAM 10 MG: 5 TABLET ORAL at 09:07

## 2025-07-18 RX ADMIN — SODIUM CHLORIDE: 9 INJECTION, SOLUTION INTRAVENOUS at 03:07

## 2025-07-18 RX ADMIN — SODIUM CHLORIDE, POTASSIUM CHLORIDE, SODIUM LACTATE AND CALCIUM CHLORIDE 1000 ML: 600; 310; 30; 20 INJECTION, SOLUTION INTRAVENOUS at 12:07

## 2025-07-18 RX ADMIN — POTASSIUM BICARBONATE 40 MEQ: 391 TABLET, EFFERVESCENT ORAL at 03:07

## 2025-07-18 RX ADMIN — POTASSIUM CHLORIDE 10 MEQ: 7.46 INJECTION, SOLUTION INTRAVENOUS at 10:07

## 2025-07-18 RX ADMIN — SODIUM CHLORIDE, POTASSIUM CHLORIDE, SODIUM LACTATE AND CALCIUM CHLORIDE 500 ML: 600; 310; 30; 20 INJECTION, SOLUTION INTRAVENOUS at 06:07

## 2025-07-18 RX ADMIN — POTASSIUM CHLORIDE 10 MEQ: 7.46 INJECTION, SOLUTION INTRAVENOUS at 03:07

## 2025-07-18 RX ADMIN — MIDAZOLAM 2 MG: 1 INJECTION INTRAMUSCULAR; INTRAVENOUS at 01:07

## 2025-07-18 RX ADMIN — GABAPENTIN 300 MG: 300 CAPSULE ORAL at 09:07

## 2025-07-18 RX ADMIN — MAGNESIUM SULFATE HEPTAHYDRATE 2 G: 40 INJECTION, SOLUTION INTRAVENOUS at 03:07

## 2025-07-18 RX ADMIN — POTASSIUM CHLORIDE 20 MEQ: 1500 TABLET, EXTENDED RELEASE ORAL at 09:07

## 2025-07-18 RX ADMIN — POTASSIUM CHLORIDE 10 MEQ: 7.46 INJECTION, SOLUTION INTRAVENOUS at 04:07

## 2025-07-18 RX ADMIN — THERA TABS 1 TABLET: TAB at 03:07

## 2025-07-18 RX ADMIN — SUCRALFATE 1 G: 1 TABLET ORAL at 09:07

## 2025-07-18 RX ADMIN — POTASSIUM CHLORIDE 10 MEQ: 7.46 INJECTION, SOLUTION INTRAVENOUS at 11:07

## 2025-07-18 NOTE — ASSESSMENT & PLAN NOTE
Patient's most recent potassium results are listed below.   Recent Labs     07/18/25  0106   K 2.7*     Plan  - Replete potassium per protocol  - Monitor potassium Every 12 hours  - Patient's hypokalemia is stable  - s/p 100 mEq of K in ED with minimal increase  - giving 2g Mg IV then an additional 40mEq PO of K after

## 2025-07-18 NOTE — ED PROVIDER NOTES
Source of History:  Patient and chart    Chief complaint:  Psychiatric Evaluation (Pt arrives with mother stating that she has been experiencing auditory hallucinations and paranoia. Pt's mother states she has also been speaking at a lower volume than normal. Pt denies hearing voices & denies SI/HI.)      HPI:  Val Gil is a 44 y.o. female with a medical history as below presents to the emergency department with a chief complaint of auditory hallucinations.  Patient has a long psych history and takes a variety of scheduled antipsychotic and mood stabilizing medications.  Recently she was taken off of Lamictal which he has been on successfully for almost 20 years.  Since that time her mother's reported worsening symptoms.  That has include auditory hallucinations, paranoia, food aversion with significant weight loss, lack of personal hygiene, and flattening of affect.  Patient endorses general food aversion secondary to nausea and generalized weakness.  She denies HI or SI.  He does have an attempt at self-harm in the past secondary to intentional Tylenol overdose.  Patient has had 2 recent stays inpatient psychiatric facility secondary to PECs placed in our emergency department.    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    Anxiety     Bone cancer     Depression      Past Surgical History:   Procedure Laterality Date    LEG AMPUTATION AT HIP Left 7/2015       Social History     Socioeconomic History    Marital status: Single   Tobacco Use    Smoking status: Every Day     Current packs/day: 1.00     Types: Cigarettes    Smokeless tobacco: Never   Substance and Sexual Activity    Alcohol use: No    Drug use: No    Sexual activity: Not  Currently   Other Topics Concern    Patient feels they ought to cut down on drinking/drug use No    Patient annoyed by others criticizing their drinking/drug use No    Patient has felt bad or guilty about drinking/drug use No    Patient has had a drink/used drugs as an eye opener in the AM No     Social Drivers of Health     Financial Resource Strain: Patient Unable To Answer (6/13/2025)    Overall Financial Resource Strain (CARDIA)     Difficulty of Paying Living Expenses: Patient unable to answer   Food Insecurity: Patient Unable To Answer (6/13/2025)    Hunger Vital Sign     Worried About Running Out of Food in the Last Year: Patient unable to answer     Ran Out of Food in the Last Year: Patient unable to answer   Transportation Needs: Patient Unable To Answer (6/13/2025)    PRAPARE - Transportation     Lack of Transportation (Medical): Patient unable to answer     Lack of Transportation (Non-Medical): Patient unable to answer   Physical Activity: Patient Unable To Answer (6/13/2025)    Exercise Vital Sign     Days of Exercise per Week: Patient unable to answer     Minutes of Exercise per Session: Patient unable to answer   Stress: Patient Unable To Answer (6/13/2025)    [employer] - Occupational Stress Questionnaire     Feeling of Stress : Patient unable to answer   Housing Stability: Patient Unable To Answer (6/13/2025)    Housing Stability Vital Sign     Unable to Pay for Housing in the Last Year: Patient unable to answer     Homeless in the Last Year: Patient unable to answer       Family History   Problem Relation Name Age of Onset    Cancer Paternal Uncle great uncle         lung    Cancer Maternal Grandmother          breast    Heart attack Maternal Grandmother      Arthritis Maternal Grandmother      Heart attack Maternal Grandfather      Arthritis Maternal Grandfather      Diabetes Paternal Grandmother      Hypertension Paternal Grandfather      Heart attack Paternal  Grandfather      No Known Problems Father         Physical Exam:      Vitals:    07/18/25 0500   BP:    Pulse: 95   Resp:    Temp:      Physical Exam    Gen:  Hemodynamically stable in no acute distress  Mental Status:  Alert and oriented x3.  Flat affect.  Quiet voice.  Does not make eye contact.  Cooperative but guarded  Skin: Warm, dry. No rashes seen.  Eyes: No conjunctival injection.  Pulm: CTAB. No increased work of breathing.  No significant tachypnea.  No conversational dyspnea.    CV:  Normal rate  Abd: Soft.  Not distended.  Nontender.   MSK:  Below-the-knee amputation of right leg  Neuro: Awake. Speech normal. No focal neuro deficit observed.      Procedures  Laboratory Studies:  Labs Reviewed   COMPREHENSIVE METABOLIC PANEL - Abnormal       Result Value    Sodium 142      Potassium 2.7 (*)     Chloride 103      CO2 23      Glucose 91      BUN 9      Creatinine 0.7      Calcium 9.6      Protein Total 7.7      Albumin 4.6      Bilirubin Total 0.6      ALP 67      AST 22      ALT 15      Anion Gap 16      eGFR >60     ACETAMINOPHEN LEVEL - Abnormal    Acetaminophen Level <3.0 (*)    SALICYLATE LEVEL - Abnormal    Salicylate Level <5.0 (*)    CBC WITH DIFFERENTIAL - Abnormal    WBC 4.56      RBC 4.59      HGB 13.1      HCT 37.8      MCV 82      MCH 28.5      MCHC 34.7      RDW 14.8 (*)     Platelet Count 454 (*)     MPV 10.2      Nucleated RBC 0      Neut % 52.2      Lymph % 35.1      Mono % 11.0      Eos % 0.9      Basophil % 0.4      Imm Grans % 0.4      Neut # 2.38      Lymph # 1.60      Mono # 0.50      Eos # 0.04      Baso # 0.02      Imm Grans # 0.02     ALCOHOL,MEDICAL (ETHANOL) - Normal    Alcohol, Serum <10     TSH - Normal    TSH 0.574     CBC W/ AUTO DIFFERENTIAL    Narrative:     The following orders were created for panel order CBC auto differential.                  Procedure                               Abnormality         Status                                     ---------                                -----------         ------                                     CBC with Differential[[id number]]       Abnormal            Final result                                                 Please view results for these tests on the individual orders.   URINALYSIS, REFLEX TO URINE CULTURE   DRUG SCREEN PANEL, URINE EMERGENCY   ISTAT CHEM8       Chart reviewed.  Patient has a history of opioid and benzo dependence.  She has no documented diagnosis of schizophrenia or bipolar disorder.  However, her mother states that she has been diagnosed with bipolar disorder in the past.    Imaging Results    None         Medications Given:  Medications   0.9% NaCl infusion ( Intravenous New Bag 7/18/25 0318)   potassium bicarbonate disintegrating tablet 40 mEq (40 mEq Oral Given 7/18/25 0327)   potassium chloride 10 mEq in 100 mL IVPB (0 mEq Intravenous Stopped 7/18/25 0444)   potassium chloride 10 mEq in 100 mL IVPB (10 mEq Intravenous New Bag 7/18/25 0449)       ED Course as of 07/18/25 0557   Fri Jul 18, 2025   0131 CBC auto differential(!)  No leukocytosis or acute anemia [VC]   0338 Comprehensive metabolic panel(!!)  Hypokalemic.  We will replete and retest [VC]   0339 EKG 12-lead  Normal sinus rhythm.  Normal axis.  Normal intervals.  T-wave inversions in leads V3 V4.  No STEMI [VC]   0554 At this time patient's care has been assumed by the following team. [VC]      ED Course User Index  [VC] Keny Zepeda MD           MDM:    44 y.o. female with auditory hallucinations    Differential includes but isn't limited to polypharmacy, hypothyroid, schizophrenia or other psychiatric illness    Patient has clear signs of negative affect of schizophrenia.  She is not eating, losing weight, not taking care of herself, in his gravely disabled.  For this reason I pec the patient.  Medical evaluation showed a hypokalemia.  We are repleting will retest prior to medical clearance.  EKGs shows no signs of hypokalemia.  At this time  patient's care has been assumed by the following team.      Medical Decision Making  Amount and/or Complexity of Data Reviewed  Labs: ordered. Decision-making details documented in ED Course.  ECG/medicine tests:  Decision-making details documented in ED Course.    Risk  Prescription drug management.         Diagnostic Impression:    1. Auditory hallucinations    2. Medical clearance for psychiatric admission    3. Weakness               Patient and/or family understands the plan and is in agreement, verbalized understanding, questions answered       OB History          0    Para   0    Term   0       0    AB   0    Living   0         SAB   0    IAB   0    Ectopic   0    Multiple   0    Live Births                            [1]   No current facility-administered medications on file prior to encounter.     Current Outpatient Medications on File Prior to Encounter   Medication Sig Dispense Refill    amLODIPine (NORVASC) 5 MG tablet Take 1 tablet (5 mg total) by mouth once daily. 30 tablet 0    diazePAM (VALIUM) 10 MG Tab TAKE 1 TABLET BY MOUTH DAILY AS NEEDED FOR ANXIETY  0    famotidine (PEPCID) 20 MG tablet Take 20 mg by mouth every morning.  5    gabapentin (NEURONTIN) 300 MG capsule Take 1 capsule (300 mg total) by mouth 3 (three) times daily. 90 capsule 0    LORazepam (ATIVAN) 1 MG tablet Take 1 tablet (1 mg total) by mouth 3 (three) times daily as needed for Anxiety.  0    ondansetron (ZOFRAN-ODT) 8 MG TbDL Take 8 mg by mouth once daily.  4    prazosin (MINIPRESS) 2 MG Cap Take 1 capsule (2 mg total) by mouth every evening. 30 capsule 0    promethazine (PHENERGAN) 25 MG tablet Take 25 mg by mouth daily as needed for Nausea.   1    QUEtiapine (SEROQUEL) 200 MG Tab Take 1 tablet (200 mg total) by mouth every evening. 30 tablet 0    senna-docusate (PERICOLACE) 8.6-50 mg per tablet Take 2 tablets by mouth once daily. 30 tablet 0    valACYclovir (VALTREX) 500 MG tablet Valtrex 500 mg tablet   Take 1  tablet twice a day by oral route for 5 days.          Keny Zepeda MD  Resident  07/18/25 4281

## 2025-07-18 NOTE — SUBJECTIVE & OBJECTIVE
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     Current Outpatient Medications on File Prior to Encounter   Medication Sig    amLODIPine (NORVASC) 5 MG tablet Take 1 tablet (5 mg total) by mouth once daily.    diazePAM (VALIUM) 10 MG Tab TAKE 1 TABLET BY MOUTH DAILY AS NEEDED FOR ANXIETY    famotidine (PEPCID) 20 MG tablet Take 20 mg by mouth every morning.    gabapentin (NEURONTIN) 300 MG capsule Take 1 capsule (300 mg total) by mouth 3 (three) times daily.    LORazepam (ATIVAN) 1 MG tablet Take 1 tablet (1 mg total) by mouth 3 (three) times daily as needed for Anxiety.    ondansetron (ZOFRAN-ODT) 8 MG TbDL Take 8 mg by mouth once daily.    prazosin (MINIPRESS) 2 MG Cap Take 1 capsule (2 mg total) by mouth every evening.    promethazine (PHENERGAN) 25 MG tablet Take 25 mg by mouth daily as needed for Nausea.     QUEtiapine (SEROQUEL) 200 MG Tab Take 1 tablet (200 mg total) by mouth every evening.    senna-docusate (PERICOLACE) 8.6-50 mg per tablet Take 2 tablets by mouth once daily.    valACYclovir (VALTREX) 500 MG tablet Valtrex 500 mg tablet   Take 1 tablet twice a day by oral route for 5 days.     Family History       Problem Relation (Age of Onset)    Arthritis Maternal Grandmother, Maternal Grandfather    Cancer Paternal Uncle, Maternal Grandmother    Diabetes Paternal Grandmother    Heart attack Maternal Grandmother, Maternal Grandfather, Paternal Grandfather    Hypertension Paternal Grandfather    No Known Problems Father          Tobacco Use    Smoking status: Every Day     Current packs/day: 1.00      Types: Cigarettes    Smokeless tobacco: Never   Substance and Sexual Activity    Alcohol use: No    Drug use: No    Sexual activity: Not Currently     Review of Systems   Constitutional:  Positive for appetite change. Negative for activity change, chills and fever.   HENT:  Negative for trouble swallowing.    Eyes:  Negative for photophobia and visual disturbance.   Respiratory:  Negative for cough, chest tightness and shortness of breath.    Cardiovascular:  Negative for chest pain, palpitations and leg swelling.   Gastrointestinal:  Negative for abdominal pain, constipation, diarrhea, nausea and vomiting.   Genitourinary:  Negative for dysuria, frequency and hematuria.   Musculoskeletal:  Negative for back pain, gait problem and neck pain.   Skin:  Negative for rash and wound.   Neurological:  Negative for dizziness, syncope, speech difficulty and light-headedness.   Psychiatric/Behavioral:  Positive for behavioral problems and hallucinations. Negative for agitation, confusion, self-injury and suicidal ideas. The patient is not nervous/anxious.      Objective:     Vital Signs (Most Recent):  Temp: 98.2 °F (36.8 °C) (07/18/25 0700)  Pulse: 92 (07/18/25 1400)  Resp: 20 (07/18/25 1143)  BP: (!) 153/91 (07/18/25 1400)  SpO2: 98 % (07/18/25 1400) Vital Signs (24h Range):  Temp:  [97.7 °F (36.5 °C)-98.2 °F (36.8 °C)] 98.2 °F (36.8 °C)  Pulse:  [] 92  Resp:  [12-20] 20  SpO2:  [97 %-100 %] 98 %  BP: (121-153)/(83-91) 153/91     Weight: 49.9 kg (110 lb 0.2 oz)  Body mass index is 18.31 kg/m².     Physical Exam  Vitals and nursing note reviewed.   Constitutional:       General: She is not in acute distress.     Appearance: Normal appearance. She is not ill-appearing.   HENT:      Head: Normocephalic and atraumatic.      Right Ear: External ear normal.      Left Ear: External ear normal.   Eyes:      Extraocular Movements: Extraocular movements intact.      Conjunctiva/sclera: Conjunctivae normal.      Pupils: Pupils  "are equal, round, and reactive to light.   Cardiovascular:      Rate and Rhythm: Normal rate and regular rhythm.      Pulses: Normal pulses.      Heart sounds: Normal heart sounds. No murmur heard.  Pulmonary:      Effort: Pulmonary effort is normal. No respiratory distress.      Breath sounds: Normal breath sounds.   Abdominal:      General: Abdomen is flat. Bowel sounds are normal.      Palpations: Abdomen is soft.      Tenderness: There is no abdominal tenderness.   Musculoskeletal:         General: Normal range of motion.      Cervical back: Normal range of motion and neck supple. No tenderness.      Right lower leg: No edema.      Left lower leg: No edema.   Skin:     General: Skin is warm and dry.      Capillary Refill: Capillary refill takes less than 2 seconds.      Coloration: Skin is not jaundiced.   Neurological:      General: No focal deficit present.      Mental Status: She is alert and oriented to person, place, and time. Mental status is at baseline.   Psychiatric:         Mood and Affect: Mood normal. Affect is flat.         Behavior: Behavior normal. Behavior is cooperative.         Thought Content: Thought content does not include homicidal or suicidal ideation.      Comments: Patient answers questions appropriately then have some tangential whispering before saying "nevermind" and stopping.               CRANIAL NERVES     CN III, IV, VI   Pupils are equal, round, and reactive to light.       Significant Labs: All pertinent labs within the past 24 hours have been reviewed.  BMP:   Recent Labs   Lab 07/18/25  0106   GLU 91      K 2.7*      CO2 23   BUN 9   CREATININE 0.7   CALCIUM 9.6     CBC:   Recent Labs   Lab 07/18/25  0106 07/18/25  0650 07/18/25  0707 07/18/25  1400   WBC 4.56  --   --   --    HGB 13.1  --   --   --    HCT 37.8 33* 34* 35*   *  --   --   --      CMP:   Recent Labs   Lab 07/18/25  0106      K 2.7*      CO2 23   GLU 91   BUN 9   CREATININE 0.7 "   CALCIUM 9.6   PROT 7.7   ALBUMIN 4.6   BILITOT 0.6   ALKPHOS 67   AST 22   ALT 15   ANIONGAP 16     Urine Studies:   Recent Labs   Lab 07/18/25  1347   COLORU Yellow   APPEARANCEUA Clear   PHUR 6.0   SPECGRAV 1.020   PROTEINUA 1+*   GLUCUA Negative   BILIRUBINUA Negative   OCCULTUA Negative   NITRITE Negative   UROBILINOGEN Negative   LEUKOCYTESUR Negative   RBCUA 1   WBCUA 1   BACTERIA Rare   HYALINECASTS 0       Significant Imaging: I have reviewed all pertinent imaging results/findings within the past 24 hours.

## 2025-07-18 NOTE — ED NOTES
At this time, pt medication count completed. Medications put in safe bags with counts verified with Estella RN:    Bag [id number]   Diazepam 10mg (bottle 1) x27  Quetiapine 100 mg x43  Melatonin 5mg x24  Buprenoprphine 8mg x12 & 1/2  Diazepam 10 mg (bottle 1) x9 & 1/2  Doxazosin 2mg in a folic acid bottle x23  Amlodipine 5mg ([facility] bottle) x30   Lamotrigine 25 mg x45   Aripiprazole 15mg x5  Sucralfate 1g x3  Esomeprazole 40mg x3    Bag [id number]    Pantoprazole 40mg x9  Empty hydroxyzine bottle x1  Tizanidine 2mg in zolpidem bottle x18  Amlodipine 5mg ([facility] bottle) x3  Quetiapine 200mg in sterile container x1  Celecoxib 100mg in sterile container x1  Quetiapine 25mg in sterile container x3  Lorazepam 1mg in sterile container x5  Sterile container of loose medication x1

## 2025-07-18 NOTE — ASSESSMENT & PLAN NOTE
- mother reports increase in auditory hallucinations, paranoid, and food aversion since stopping lamictal  - has had 2 recent psychiatric admissions  - hx of SI 2/2 tylenol overdose and tricyclic overdose  - patient has very flat affect with tangential whispering  - started on home antipsychotics  - did not restart lamictal at this time  - PEC in place in the ED  - Psych consulted, appreciate recs  - delirium precautions

## 2025-07-18 NOTE — ED NOTES
"Val Gil, a 44 y.o. female presents to the ED w/ complaint of potential auditory hallucinations and paranoia. Pt was brought in by her mother, who reports that patient has been "hearing things" and speaking at a lower volume than normal. Pt was recently taken off of her Lamictal, which she has taken successfully for 20 years. Pt's mother reports worsening symptoms since the medication was stopped. Pt denies AH, SI, and HI. Pt noted to be speaking at low volume.  "

## 2025-07-18 NOTE — PROVIDER PROGRESS NOTES - EMERGENCY DEPT.
"Encounter Date: 7/17/2025    ED Physician Progress Notes          ED [place] of Care Note  7:21 AM 7/18/2025  Val Gil is a 44 y.o. female who presented to the ED on 7/17/2025 and has been managed by , who reports patient C/O psychiatric eval. I assumed care of patient from off-going ED physician team at 7:21 AM pending istat, UA, UDS.    On my evaluation, Val Gil appears well and is hemodynamically stable. Thus far, Val Gil has received:  Medications   0.9% NaCl infusion ( Intravenous New Bag 7/18/25 0318)   magnesium sulfate 2g in water 50mL IVPB (premix) (has no administration in time range)   potassium bicarbonate disintegrating tablet 40 mEq (40 mEq Oral Given 7/18/25 0327)   potassium chloride 10 mEq in 100 mL IVPB (0 mEq Intravenous Stopped 7/18/25 0444)   potassium chloride 10 mEq in 100 mL IVPB (0 mEq Intravenous Stopped 7/18/25 0623)   lactated ringers bolus 500 mL (0 mLs Intravenous Stopped 7/18/25 0759)   potassium chloride 10 mEq in 100 mL IVPB (0 mEq Intravenous Stopped 7/18/25 1145)   potassium chloride SA CR tablet 20 mEq (20 mEq Oral Given 7/18/25 0900)   lactated ringers bolus 1,000 mL (1,000 mLs Intravenous New Bag 7/18/25 1244)   midazolam injection 2 mg (2 mg Intravenous Given 7/18/25 1316)       On my exam, I appreciate:  BP (!) 142/89   Pulse 87   Temp 98.2 °F (36.8 °C) (Oral)   Resp 20   Ht 5' 5" (1.651 m)   Wt 49.9 kg (110 lb 0.2 oz)   SpO2 97%   Breastfeeding No   BMI 18.31 kg/m²     ED Course as of 07/18/25 1400   Fri Jul 18, 2025   0131 CBC auto differential(!)  No leukocytosis or acute anemia [VC]   0338 Comprehensive metabolic panel(!!)  Hypokalemic.  We will replete and retest [VC]   0339 EKG 12-lead  Normal sinus rhythm.  Normal axis.  Normal intervals.  T-wave inversions in leads V3 V4.  No STEMI [VC]   0554 At this time patient's care has been assumed by the following team. [VC]      ED Course User Index  [VC] Keny Zepeda, " MD        Additional ED course:  Assumed care of patient.   i-STAT continues to reflect hypokalemia.  Patient is still has not urinated.  We will give IV bolus as well as an additional 10 mEq KCL IV and 20 mEq KCL tablet.  Repeat i-STAT after KCl infusion.  Despite being in the department for 13 hours patient is still has not produced urine.  She has gotten on IV fluids throughout.  Bladder scan showed over 300 cc.  Given Versed for anxiety and straight cath patient for air and sample.  Obtained over 700 cc of dark urine from straight cath.  Patient denies sensation of bladder fullness or urge to urinate.  Given significant volume and lack of sensation decision was made to replace [place] catheter.  Given patient's hypokalemia and urinary retention elected to admit for further management.  Discussed case with hospital medicine who admitted the patient.    Disposition:  Admit  I have discussed and counseled Val Radha Harrisonalicjain regarding exam, results, diagnosis, treatment, and plan.  ______________________  Delfina Sim MD   Emergency Medicine Physician  7/18/2025

## 2025-07-18 NOTE — ED NOTES
I-STAT Chem-8+ Results:   Value Reference Range   Sodium 140 136-145 mmol/L   Potassium  2.9 3.5-5.1 mmol/L   Chloride 105  mmol/L   Ionized Calcium 1.16 1.06-1.42 mmol/L   CO2 (measured) 19 23-29 mmol/L   Glucose 90  mg/dL   BUN 8 6-30 mg/dL   Creatinine 0.5 0.5-1.4 mg/dL   Hematocrit 34 36-54%

## 2025-07-18 NOTE — ASSESSMENT & PLAN NOTE
Anemia is likely due to chronic disease Most recent hemoglobin and hematocrit are listed below.  Recent Labs     07/18/25  0106 07/18/25  0650 07/18/25  0707 07/18/25  1400   HGB 13.1  --   --   --    HCT 37.8 33* 34* 35*     Plan  - Monitor serial CBC: Daily  - Transfuse PRBC if patient becomes hemodynamically unstable, symptomatic or H/H drops below 7/21.  - Patient has not received any PRBC transfusions to date  - Patient's anemia is currently stable

## 2025-07-18 NOTE — HPI
Val Gil is a 44 y.o. female with PMHx significant for sarcoma, R AKA,  IVDU, depression, anemia admitted to hospital medicine for hypokalemia. Patient presented with her mother this morning with complaint of auditory hallucinations. Patient has a long psych history and takes a variety of scheduled antipsychotic and mood stabilizing medications.  Recently she was taken off of Lamictal which she has been on successfully for almost 20 years.  Mother reports that since stopping medications, she has had worsening auditory hallucinations, paranoia, food aversion. Mother endorses significant weight loss, lack of personal hygiene, and flattening of affect. Patient also endorses general food aversion secondary to nausea and generalized weakness.  She denies HI or SI, but has a hx of SI with tylenol overdose. Patient has had 2 recent stays inpatient psychiatric facility. Patient and mother also endorse some dysuria and decreased urine output. Denies fever/chills, diaphoresis, lightheadedness, HA, SOB, CP, cough, congestion, abdominal pain, n/v/d, changes in BMs, numbness/tingling, weakness.     In the ED, AFVSS aside from mild tachycardia to 111. K 2.7. Remaining labs are largely unremarkable. UA and urine tox pending. Patient given several doses of potassium totaling 100 mEq, midazolam x1, and 1.5L IVF. Repeat K 3.1. PEC placed in the ED.

## 2025-07-18 NOTE — ED NOTES
Dr MARCUS Sim notified via chat pt is refusing to take Haldol because it causes her to have side effects

## 2025-07-18 NOTE — ED NOTES
I-STAT Chem-8+ Results:   Value Reference Range   Sodium 141 136-145 mmol/L   Potassium  3.1 3.5-5.1 mmol/L   Chloride 105  mmol/L   Ionized Calcium 1.16 1.06-1.42 mmol/L   CO2 (measured) 20 23-29 mmol/L   Glucose 90  mg/dL   BUN 5 6-30 mg/dL   Creatinine 0.4 0.5-1.4 mg/dL   Hematocrit 35 36-54%

## 2025-07-18 NOTE — ED NOTES
Юлия PCT is maintaining eye contact on pt   Pt is  awake, alert ,quiet and calm at present Resp full and reg   Pt is refusing to answer questions regarding orientation Pt is refusing to be catheterized for a urine specimen

## 2025-07-18 NOTE — ED NOTES
Pt placed in paper scrubs at this time. Belongings brought to [facility] closet include    1x white shirt  1x white croc   2x socks   1x pair of pants   1x blue shirt

## 2025-07-18 NOTE — ED NOTES
Assumed care for pt after recieving report from nightshift RN. Pt. resting in bed in NAD, RR e/u. Vital signs stable and within desired limits at this time of assessment. Pt. offered bathroom assistance and denies need at this time. Explanation of care/wait provided. Pt verbalizes no needs at this time. Bed in low, locked position with rails up and call bell in reach. Pt's white board updated with today's care team and plan.     Patient identifiers for Val Gil 44 y.o. female checked and correct.  Chief Complaint   Patient presents with    Psychiatric Evaluation     Pt arrives with mother stating that she has been experiencing auditory hallucinations and paranoia. Pt's mother states she has also been speaking at a lower volume than normal. Pt denies hearing voices & denies SI/HI.     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    LOC: Patient is awake, alert, and aware of environment with an appropriate affect. Patient is oriented x 4 and speaking appropriately.  APPEARANCE: Patient resting comfortably and in no acute distress. Patient is clean and well groomed, patient's clothing is properly fastened.  HEENT: WDL  SKIN: The skin is warm and dry. Patient has normal skin turgor and moist mucus membranes.   MUSCULOSKELETAL: Patient is moving all extremities well, no obvious deformities noted. Pulses intact. 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 Respirations are spontaneous and non-labored with normal effort and rate.  CARDIAC: Patient has a normal rate and rhythm. 90 on cardiac monitor. No peripheral edema noted. Denies chest pain and SOB at at time of assessment.   ABDOMEN:  No distention noted. Soft and non-tender upon palpation.  NEUROLOGICAL: pupils 3mm, PERRL. Facial expression is symmetrical. Hand grasps are equal bilaterally. Normal sensation in all extremities when touched with finger.

## 2025-07-18 NOTE — ED NOTES
Pt's mother Nathen Reis states she is the patient's legal guardian and wants to be contacted at [phone]

## 2025-07-18 NOTE — ED NOTES
Юлия PCT sitter states pt is attempting to climb out of bed Pt states she can not take Haldol because she has side effects from this medication

## 2025-07-18 NOTE — H&P
[facility] - [facility] Dept  [facility] Medicine  History & Physical    Patient Name: Val Gil  MRN: [id number]  Patient Class: OP- Observation  Admission Date: 7/17/2025  Attending Physician: Saul Mccray MD   Primary Care Provider: Faby Primary Doctor         Patient information was obtained from patient, parent, past medical records, and ER records.     Subjective:     Principal Problem:Hypokalemia    Chief Complaint:   Chief Complaint   Patient presents with    Psychiatric Evaluation     Pt arrives with mother stating that she has been experiencing auditory hallucinations and paranoia. Pt's mother states she has also been speaking at a lower volume than normal. Pt denies hearing voices & denies SI/HI.        HPI: Val Gil is a 44 y.o. female with PMHx significant for sarcoma, R AKA,  IVDU, depression, anemia admitted to hospital medicine for hypokalemia. Patient presented with her mother this morning with complaint of auditory hallucinations. Patient has a long psych history and takes a variety of scheduled antipsychotic and mood stabilizing medications.  Recently she was taken off of Lamictal which she has been on successfully for almost 20 years.  Mother reports that since stopping medications, she has had worsening auditory hallucinations, paranoia, food aversion. Mother endorses significant weight loss, lack of personal hygiene, and flattening of affect. Patient also endorses general food aversion secondary to nausea and generalized weakness.  She denies HI or SI, but has a hx of SI with tylenol overdose. Patient has had 2 recent stays inpatient psychiatric facility. Patient and mother also endorse some dysuria and decreased urine output. Denies fever/chills, diaphoresis, lightheadedness, HA, SOB, CP, cough, congestion, abdominal pain, n/v/d, changes in BMs, numbness/tingling, weakness.     In the ED, AFVSS aside from mild tachycardia to 111. K 2.7. Remaining labs are largely  unremarkable. UA and urine tox pending. Patient given several doses of potassium totaling 100 mEq, midazolam x1, and 1.5L IVF. Repeat K 3.1.     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     Current Outpatient Medications on File Prior to Encounter   Medication Sig    amLODIPine (NORVASC) 5 MG tablet Take 1 tablet (5 mg total) by mouth once daily.    diazePAM (VALIUM) 10 MG Tab TAKE 1 TABLET BY MOUTH DAILY AS NEEDED FOR ANXIETY    famotidine (PEPCID) 20 MG tablet Take 20 mg by mouth every morning.    gabapentin (NEURONTIN) 300 MG capsule Take 1 capsule (300 mg total) by mouth 3 (three) times daily.    LORazepam (ATIVAN) 1 MG tablet Take 1 tablet (1 mg total) by mouth 3 (three) times daily as needed for Anxiety.    ondansetron (ZOFRAN-ODT) 8 MG TbDL Take 8 mg by mouth once daily.    prazosin (MINIPRESS) 2 MG Cap Take 1 capsule (2 mg total) by mouth every evening.    promethazine (PHENERGAN) 25 MG tablet Take 25 mg by mouth daily as needed for Nausea.     QUEtiapine (SEROQUEL) 200 MG Tab Take 1 tablet (200 mg total) by mouth every evening.    senna-docusate (PERICOLACE) 8.6-50 mg per tablet Take 2 tablets by mouth once daily.    valACYclovir (VALTREX) 500 MG tablet Valtrex 500 mg tablet   Take 1 tablet twice a day by oral route for 5 days.     Family History       Problem Relation (Age of Onset)    Arthritis Maternal Grandmother, Maternal Grandfather    Cancer Paternal Uncle, Maternal Grandmother    Diabetes Paternal Grandmother    Heart attack Maternal Grandmother, Maternal Grandfather, Paternal Grandfather     Hypertension Paternal Grandfather    No Known Problems Father          Tobacco Use    Smoking status: Every Day     Current packs/day: 1.00     Types: Cigarettes    Smokeless tobacco: Never   Substance and Sexual Activity    Alcohol use: No    Drug use: No    Sexual activity: Not Currently     Review of Systems   Constitutional:  Positive for appetite change. Negative for activity change, chills and fever.   HENT:  Negative for trouble swallowing.    Eyes:  Negative for photophobia and visual disturbance.   Respiratory:  Negative for cough, chest tightness and shortness of breath.    Cardiovascular:  Negative for chest pain, palpitations and leg swelling.   Gastrointestinal:  Negative for abdominal pain, constipation, diarrhea, nausea and vomiting.   Genitourinary:  Negative for dysuria, frequency and hematuria.   Musculoskeletal:  Negative for back pain, gait problem and neck pain.   Skin:  Negative for rash and wound.   Neurological:  Negative for dizziness, syncope, speech difficulty and light-headedness.   Psychiatric/Behavioral:  Positive for behavioral problems and hallucinations. Negative for agitation, confusion, self-injury and suicidal ideas. The patient is not nervous/anxious.      Objective:     Vital Signs (Most Recent):  Temp: 98.2 °F (36.8 °C) (07/18/25 0700)  Pulse: 92 (07/18/25 1400)  Resp: 20 (07/18/25 1143)  BP: (!) 153/91 (07/18/25 1400)  SpO2: 98 % (07/18/25 1400) Vital Signs (24h Range):  Temp:  [97.7 °F (36.5 °C)-98.2 °F (36.8 °C)] 98.2 °F (36.8 °C)  Pulse:  [] 92  Resp:  [12-20] 20  SpO2:  [97 %-100 %] 98 %  BP: (121-153)/(83-91) 153/91     Weight: 49.9 kg (110 lb 0.2 oz)  Body mass index is 18.31 kg/m².     Physical Exam  Vitals and nursing note reviewed.   Constitutional:       General: She is not in acute distress.     Appearance: Normal appearance. She is not ill-appearing.   HENT:      Head: Normocephalic and atraumatic.      Right Ear: External ear normal.      Left Ear:  "External ear normal.   Eyes:      Extraocular Movements: Extraocular movements intact.      Conjunctiva/sclera: Conjunctivae normal.      Pupils: Pupils are equal, round, and reactive to light.   Cardiovascular:      Rate and Rhythm: Normal rate and regular rhythm.      Pulses: Normal pulses.      Heart sounds: Normal heart sounds. No murmur heard.  Pulmonary:      Effort: Pulmonary effort is normal. No respiratory distress.      Breath sounds: Normal breath sounds.   Abdominal:      General: Abdomen is flat. Bowel sounds are normal.      Palpations: Abdomen is soft.      Tenderness: There is no abdominal tenderness.   Musculoskeletal:         General: Normal range of motion.      Cervical back: Normal range of motion and neck supple. No tenderness.      Right lower leg: No edema.      Left lower leg: No edema.   Skin:     General: Skin is warm and dry.      Capillary Refill: Capillary refill takes less than 2 seconds.      Coloration: Skin is not jaundiced.   Neurological:      General: No focal deficit present.      Mental Status: She is alert and oriented to person, place, and time. Mental status is at baseline.   Psychiatric:         Mood and Affect: Mood normal. Affect is flat.         Behavior: Behavior normal. Behavior is cooperative.         Thought Content: Thought content does not include homicidal or suicidal ideation.      Comments: Patient answers questions appropriately then have some tangential whispering before saying "nevermind" and stopping.               CRANIAL NERVES     CN III, IV, VI   Pupils are equal, round, and reactive to light.       Significant Labs: All pertinent labs within the past 24 hours have been reviewed.  BMP:   Recent Labs   Lab 07/18/25  0106   GLU 91      K 2.7*      CO2 23   BUN 9   CREATININE 0.7   CALCIUM 9.6     CBC:   Recent Labs   Lab 07/18/25  0106 07/18/25  0650 07/18/25  0707 07/18/25  1400   WBC 4.56  --   --   --    HGB 13.1  --   --   --    HCT 37.8 " 33* 34* 35*   *  --   --   --      CMP:   Recent Labs   Lab 07/18/25  0106      K 2.7*      CO2 23   GLU 91   BUN 9   CREATININE 0.7   CALCIUM 9.6   PROT 7.7   ALBUMIN 4.6   BILITOT 0.6   ALKPHOS 67   AST 22   ALT 15   ANIONGAP 16     Urine Studies:   Recent Labs   Lab 07/18/25  1347   COLORU Yellow   APPEARANCEUA Clear   PHUR 6.0   SPECGRAV 1.020   PROTEINUA 1+*   GLUCUA Negative   BILIRUBINUA Negative   OCCULTUA Negative   NITRITE Negative   UROBILINOGEN Negative   LEUKOCYTESUR Negative   RBCUA 1   WBCUA 1   BACTERIA Rare   HYALINECASTS 0       Significant Imaging: I have reviewed all pertinent imaging results/findings within the past 24 hours.    Assessment/Plan:     Assessment & Plan  Hypokalemia  Patient's most recent potassium results are listed below.   Recent Labs     07/18/25  0106   K 2.7*     Plan  - Replete potassium per protocol  - Monitor potassium Every 12 hours  - Patient's hypokalemia is stable  - s/p 100 mEq of K in ED with minimal increase  - giving 2g Mg IV then an additional 40mEq PO of K after  Anemia, chronic disease  Anemia is likely due to chronic disease Most recent hemoglobin and hematocrit are listed below.  Recent Labs     07/18/25  0106 07/18/25  0650 07/18/25  0707 07/18/25  1400   HGB 13.1  --   --   --    HCT 37.8 33* 34* 35*     Plan  - Monitor serial CBC: Daily  - Transfuse PRBC if patient becomes hemodynamically unstable, symptomatic or H/H drops below 7/21.  - Patient has not received any PRBC transfusions to date  - Patient's anemia is currently stable  Benzodiazepine dependence  - prn diazepam and hydroxyzine    Auditory hallucinations  Anxiety  Adjustment disorder with mixed anxiety and depressed mood  Inadequate oral intake  Weakness generalized  - mother reports increase in auditory hallucinations, paranoid, and food aversion since stopping lamictal  - has had 2 recent psychiatric admissions  - hx of SI 2/2 tylenol overdose and tricyclic overdose  -  patient has very flat affect with tangential whispering  - started on home antipsychotics  - did not restart lamictal at this time  - PEC in place in the ED  - Psych consulted, appreciate recs  - delirium precautions  Hx of AKA (above knee amputation), right  Epithelioid sarcoma  - hx noted  Hypertension  Patient's blood pressure range in the last 24 hours was: BP  Min: 121/83  Max: 154/93.The patient's inpatient anti-hypertensive regimen is listed below:  Current Antihypertensives  prazosin capsule 2 mg, Nightly, Oral  cloNIDine tablet 0.1 mg, Every 6 hours PRN, Oral  amLODIPine tablet 5 mg, Daily, Oral    Plan  - BP is controlled, no changes needed to their regimen  VTE Risk Mitigation (From admission, onward)      None                 On 07/18/2025, patient should be placed in hospital observation services under my care in collaboration with Saul Mccray MD.           MARY WallsC  Department of Hospital Medicine  [facility] - [facility] Dept

## 2025-07-18 NOTE — ASSESSMENT & PLAN NOTE
Patient's blood pressure range in the last 24 hours was: BP  Min: 121/83  Max: 154/93.The patient's inpatient anti-hypertensive regimen is listed below:  Current Antihypertensives  prazosin capsule 2 mg, Nightly, Oral  cloNIDine tablet 0.1 mg, Every 6 hours PRN, Oral  amLODIPine tablet 5 mg, Daily, Oral    Plan  - BP is controlled, no changes needed to their regimen

## 2025-07-19 PROBLEM — D64.9 ANEMIA: Status: ACTIVE | Noted: 2025-07-19

## 2025-07-19 PROBLEM — K59.09 CHRONIC CONSTIPATION: Status: ACTIVE | Noted: 2025-07-19

## 2025-07-19 LAB
ANION GAP (OHS): 10 MMOL/L (ref 8–16)
BUN SERPL-MCNC: 6 MG/DL (ref 6–20)
CALCIUM SERPL-MCNC: 8.4 MG/DL (ref 8.7–10.5)
CHLORIDE SERPL-SCNC: 109 MMOL/L (ref 95–110)
CO2 SERPL-SCNC: 19 MMOL/L (ref 23–29)
CREAT SERPL-MCNC: 0.5 MG/DL (ref 0.5–1.4)
GFR SERPLBLD CREATININE-BSD FMLA CKD-EPI: >60 ML/MIN/1.73/M2
GLUCOSE SERPL-MCNC: 68 MG/DL (ref 70–110)
HCG INTACT+B SERPL-ACNC: <2.42 MIU/ML
HOLD SPECIMEN: NORMAL
OHS QRS DURATION: 88 MS
OHS QTC CALCULATION: 497 MS
POCT GLUCOSE: 82 MG/DL (ref 70–110)
POTASSIUM SERPL-SCNC: 3.7 MMOL/L (ref 3.5–5.1)
SODIUM SERPL-SCNC: 138 MMOL/L (ref 136–145)

## 2025-07-19 PROCEDURE — 96376 TX/PRO/DX INJ SAME DRUG ADON: CPT

## 2025-07-19 PROCEDURE — 63600175 PHARM REV CODE 636 W HCPCS

## 2025-07-19 PROCEDURE — 36415 COLL VENOUS BLD VENIPUNCTURE: CPT | Performed by: PHYSICIAN ASSISTANT

## 2025-07-19 PROCEDURE — 36415 COLL VENOUS BLD VENIPUNCTURE: CPT

## 2025-07-19 PROCEDURE — 84702 CHORIONIC GONADOTROPIN TEST: CPT

## 2025-07-19 PROCEDURE — G0378 HOSPITAL OBSERVATION PER HR: HCPCS

## 2025-07-19 PROCEDURE — 99214 OFFICE O/P EST MOD 30 MIN: CPT | Mod: AF,HB,, | Performed by: PSYCHIATRY & NEUROLOGY

## 2025-07-19 PROCEDURE — 82310 ASSAY OF CALCIUM: CPT | Performed by: PHYSICIAN ASSISTANT

## 2025-07-19 PROCEDURE — 25000003 PHARM REV CODE 250: Performed by: PHYSICIAN ASSISTANT

## 2025-07-19 RX ORDER — POLYETHYLENE GLYCOL 3350 17 G/17G
17 POWDER, FOR SOLUTION ORAL DAILY
Qty: 510 G | Refills: 2 | Status: ON HOLD | OUTPATIENT
Start: 2025-07-20

## 2025-07-19 RX ORDER — DIAZEPAM 10 MG/1
10 TABLET ORAL 2 TIMES DAILY PRN
Status: ON HOLD
Start: 2025-07-19 | End: 2025-08-18

## 2025-07-19 RX ORDER — HYDROXYZINE PAMOATE 25 MG/1
25 CAPSULE ORAL EVERY 8 HOURS PRN
Qty: 30 CAPSULE | Refills: 1 | Status: ON HOLD | OUTPATIENT
Start: 2025-07-19

## 2025-07-19 RX ORDER — PRAZOSIN HYDROCHLORIDE 2 MG/1
2 CAPSULE ORAL NIGHTLY
Status: ON HOLD
Start: 2025-07-19 | End: 2026-07-19

## 2025-07-19 RX ADMIN — THERA TABS 1 TABLET: TAB at 08:07

## 2025-07-19 RX ADMIN — POTASSIUM CHLORIDE 10 MEQ: 7.46 INJECTION, SOLUTION INTRAVENOUS at 12:07

## 2025-07-19 RX ADMIN — AMLODIPINE BESYLATE 5 MG: 5 TABLET ORAL at 08:07

## 2025-07-19 RX ADMIN — ACETAMINOPHEN 650 MG: 325 TABLET ORAL at 08:07

## 2025-07-19 RX ADMIN — GABAPENTIN 300 MG: 300 CAPSULE ORAL at 08:07

## 2025-07-19 RX ADMIN — POTASSIUM CHLORIDE 10 MEQ: 7.46 INJECTION, SOLUTION INTRAVENOUS at 01:07

## 2025-07-19 RX ADMIN — SUCRALFATE 1 G: 1 TABLET ORAL at 12:07

## 2025-07-19 RX ADMIN — POLYETHYLENE GLYCOL 3350 17 G: 17 POWDER, FOR SOLUTION ORAL at 08:07

## 2025-07-19 RX ADMIN — GABAPENTIN 300 MG: 300 CAPSULE ORAL at 04:07

## 2025-07-19 RX ADMIN — POTASSIUM CHLORIDE 10 MEQ: 7.46 INJECTION, SOLUTION INTRAVENOUS at 03:07

## 2025-07-19 RX ADMIN — PANTOPRAZOLE SODIUM 40 MG: 40 TABLET, DELAYED RELEASE ORAL at 08:07

## 2025-07-19 RX ADMIN — ONDANSETRON 8 MG: 8 TABLET, ORALLY DISINTEGRATING ORAL at 08:07

## 2025-07-19 RX ADMIN — POTASSIUM CHLORIDE 10 MEQ: 7.46 INJECTION, SOLUTION INTRAVENOUS at 02:07

## 2025-07-19 NOTE — PLAN OF CARE
[facility] - Med Surg  Initial Discharge Assessment       Primary Care Provider: No, Primary Doctor    Admission Diagnosis: Hypokalemia [E87.6]  Urinary retention [R33.9]  Auditory hallucinations [R44.0]  Weakness [R53.1]  Medical clearance for psychiatric admission [Z00.8]    Admission Date: 7/17/2025  Expected Discharge Date: 7/19/2025    Transition of Care Barriers: None    Payor: MEDICAID / Plan: [facility] / Product Type: Managed Medicaid /     Extended Emergency Contact Information  Primary Emergency Contact: Nathen Reis   [facility]  Home Phone: [phone]  Relation: Mother   needed? No    Discharge Plan A: Psychiatric hospital  Discharge Plan B: Psychiatric [facility] - [place] - [address]  [address]  [place] [address]  Phone: [phone] Fax: [phone]    [id number] - [place]  [address] [place] [address] AT [facility] & [address]  [place] [address]  Phone: [phone] Fax: [phone]    [facility] [place] [facility] [place]  [address]  [address]  [place] [address]  Phone: [phone] Fax: [phone]      Initial Assessment (most recent)       Adult Discharge Assessment - 07/19/25 1504          Discharge Assessment    Assessment Type Discharge Planning Assessment     Confirmed/corrected address, phone number and insurance Yes     Confirmed Demographics Correct on Facesheet     Source of Information patient     Communicated TANVI with patient/caregiver Date not available/Unable to determine     People in Home parent(s)     Name(s) of People in Home Nathen [phone]     Do you expect to return to your current living situation? Yes     Do you have help at home or someone to help you manage your care at home? No     Prior to hospitilization cognitive status: Alert/Oriented     Current cognitive  status: Unable to Assess     Walking or Climbing Stairs Difficulty yes     Mobility Management wheelchair     Home Accessibility wheelchair accessible     Home Layout Able to live on 1st floor     Equipment Currently Used at Home wheelchair     Readmission within 30 days? No     Patient currently being followed by outpatient case management? No     Do you currently have service(s) that help you manage your care at home? No     Do you take prescription medications? Yes     Do you have prescription coverage? Yes     Do you have any problems affording any of your prescribed medications? TBD     Is the patient taking medications as prescribed? yes     Who is going to help you get home at discharge? unknown     How do you get to doctors appointments? family or friend will provide;health plan transportation     Are you on dialysis? No     Do you take coumadin? No     Discharge Plan A Psychiatric hospital     Discharge Plan B Psychiatric hospital     DME Needed Upon Discharge  none     Discharge Plan discussed with: Patient     Transition of Care Barriers None        Financial Resource Strain    How hard is it for you to pay for the very basics like food, housing, medical care, and heating? Not very hard        Housing Stability    In the last 12 months, was there a time when you were not able to pay the mortgage or rent on time? No     At any time in the past 12 months, were you homeless or living in a shelter (including now)? No        Transportation Needs    In the past 12 months, has lack of transportation kept you from medical appointments or from getting medications? No     In the past 12 months, has lack of transportation kept you from meetings, work, or from getting things needed for daily living? No        Food Insecurity    Within the past 12 months, you worried that your food would run out before you got the money to buy more. Never true     Within the past 12 months, the food you bought just didn't last and you  didn't have money to get more. Never true        Alcohol Use    Q2: How many drinks containing alcohol do you have on a typical day when you are drinking? Patient does not drink        Utilities    In the past 12 months has the electric, gas, oil, or water company threatened to shut off services in your home? No        Health Literacy    How often do you need to have someone help you when you read instructions, pamphlets, or other written material from your doctor or pharmacy? Sometimes                      Discharge Plan A and Plan B have been determined by review of patient's clinical status, future medical and therapeutic needs, and coverage/benefits for post-acute care in coordination with multidisciplinary team members.     CM spoke with patient in Room at bedside. All information was verified on facesheet. Patient lives in a 1 story house alone, patient normally WC bound, Patient reports has family assistance when needed, and wanted to know how long she would be at the hospital, reviewed was PEC'd and POC is dependent on providers recommendations.  Patient has not used Home Health previously. Patient is not on dialysis nor use Coumadin as a blood thinner. Patient takes medication as prescribed and has resources for all prescriptive needs. Patient will have help from family member upon discharge. Family will provide transportation home. All Questions and concerns addressed and whiteboard updated with CM contact information. Will continue to follow for course of hospitalization.       Magy Artis RN  Case Management  [phone]

## 2025-07-19 NOTE — ASSESSMENT & PLAN NOTE
Per patient's mother pt will self-disimpact to have BM sometimes. Pt initially reported having a recent BM, but pt's mother reports not having had a BM in 3 weeks. Abdomen soft and stool in colon per XR abdomen without concern for ileus. Abdomen soft, non-distended on exam.     - fleet enema administered on 07/19

## 2025-07-19 NOTE — PROGRESS NOTES
[facility] Medicine  Progress Note    Patient Name: Val Gil  MRN: [id number]  Patient Class: OP- Observation   Admission Date: 7/17/2025  Length of Stay: 0 days  Attending Physician: Saul Mccray MD  Primary Care Provider: Faby, Primary Doctor        Subjective     Principal Problem:Hypokalemia        HPI:  Val Gil is a 44 y.o. female with PMHx significant for sarcoma, R AKA,  IVDU, depression, anemia admitted to hospital medicine for hypokalemia. Patient presented with her mother this morning with complaint of auditory hallucinations. Patient has a long psych history and takes a variety of scheduled antipsychotic and mood stabilizing medications.  Recently she was taken off of Lamictal which she has been on successfully for almost 20 years.  Mother reports that since stopping medications, she has had worsening auditory hallucinations, paranoia, food aversion. Mother endorses significant weight loss, lack of personal hygiene, and flattening of affect. Patient also endorses general food aversion secondary to nausea and generalized weakness.  She denies HI or SI, but has a hx of SI with tylenol overdose. Patient has had 2 recent stays inpatient psychiatric facility. Patient and mother also endorse some dysuria and decreased urine output. Denies fever/chills, diaphoresis, lightheadedness, HA, SOB, CP, cough, congestion, abdominal pain, n/v/d, changes in BMs, numbness/tingling, weakness.     In the ED, AFVSS aside from mild tachycardia to 111. K 2.7. Remaining labs are largely unremarkable. UA and urine tox pending. Patient given several doses of potassium totaling 100 mEq, midazolam x1, and 1.5L IVF. Repeat K 3.1. PEC placed in the ED.     Overview/Hospital Course:  Patient admitted with enema administered given reported history of chronic constipation and XR abdomen with stool in colon. Voiding trial attempted.     Interval History: Pt refusing meds last night, IV K for  replacement with resolution of hypokalemia. Pt's mother expressed concern about chronic constipation (previously unmentioned yesterday) and enema administered. Voiding trial attempted. Psychiatry evaluation today.     Review of Systems  Objective:     Vital Signs (Most Recent):  Temp: 97.8 °F (36.6 °C) (07/19/25 1115)  Pulse: 88 (07/19/25 1115)  Resp: 17 (07/19/25 1115)  BP: (!) 131/90 (07/19/25 1115)  SpO2: 99 % (07/19/25 1115) Vital Signs (24h Range):  Temp:  [97.7 °F (36.5 °C)-98.3 °F (36.8 °C)] 97.8 °F (36.6 °C)  Pulse:  [] 88  Resp:  [16-20] 17  SpO2:  [96 %-99 %] 99 %  BP: (117-154)/(79-93) 131/90     Weight: 49.9 kg (110 lb 0.2 oz)  Body mass index is 18.31 kg/m².    Intake/Output Summary (Last 24 hours) at 7/19/2025 1213  Last data filed at 7/19/2025 0400  Gross per 24 hour   Intake 150 ml   Output 1300 ml   Net -1150 ml         Physical Exam  Vitals and nursing note reviewed.   Constitutional:       General: She is not in acute distress.     Appearance: Normal appearance. She is not ill-appearing.   HENT:      Head: Normocephalic and atraumatic.      Right Ear: External ear normal.      Left Ear: External ear normal.   Eyes:      Extraocular Movements: Extraocular movements intact.      Conjunctiva/sclera: Conjunctivae normal.      Pupils: Pupils are equal, round, and reactive to light.   Cardiovascular:      Rate and Rhythm: Normal rate and regular rhythm.      Pulses: Normal pulses.      Heart sounds: Normal heart sounds. No murmur heard.  Pulmonary:      Effort: Pulmonary effort is normal. No respiratory distress.      Breath sounds: Normal breath sounds.   Abdominal:      General: Abdomen is flat. Bowel sounds are normal. There is no distension.      Palpations: Abdomen is soft.      Tenderness: There is no abdominal tenderness.   Musculoskeletal:         General: Normal range of motion.      Cervical back: Normal range of motion and neck supple. No tenderness.      Right lower leg: No edema.       Left lower leg: No edema.   Skin:     General: Skin is warm and dry.      Capillary Refill: Capillary refill takes less than 2 seconds.      Coloration: Skin is not jaundiced.   Neurological:      Mental Status: She is alert and oriented to person, place, and time. Mental status is at baseline.   Psychiatric:         Mood and Affect: Mood normal. Affect is flat.         Behavior: Behavior normal. Behavior is cooperative.         Thought Content: Thought content does not include homicidal or suicidal ideation.               Significant Labs: All pertinent labs within the past 24 hours have been reviewed.    Significant Imaging: I have reviewed all pertinent imaging results/findings within the past 24 hours.      Assessment & Plan  Hypokalemia  Patient's most recent potassium results are listed below.   Recent Labs     07/18/25  0106 07/18/25  1739 07/19/25  0421   K 2.7* 3.1* 3.7     Plan  - Resolved  - Monitor potassium Every 12 hours  - Patient's hypokalemia is stable  - s/p 100 mEq of K in ED with minimal increase  - giving 2g Mg IV then an additional 40mEq PO of K after  Anemia, chronic disease  Anemia is likely due to chronic disease Most recent hemoglobin and hematocrit are listed below.  Recent Labs     07/18/25  0106 07/18/25  0650 07/18/25  0707 07/18/25  1400   HGB 13.1  --   --   --    HCT 37.8 33* 34* 35*     Plan  - Monitor serial CBC: Daily  - Transfuse PRBC if patient becomes hemodynamically unstable, symptomatic or H/H drops below 7/21.  - Patient has not received any PRBC transfusions to date  - Patient's anemia is currently stable  Benzodiazepine dependence  - prn diazepam and hydroxyzine    Auditory hallucinations  Anxiety  Adjustment disorder with mixed anxiety and depressed mood  Inadequate oral intake  Weakness generalized  - mother reports increase in auditory hallucinations, paranoid, and food aversion since stopping lamictal  - has had 2 recent psychiatric admissions  - hx of SI 2/2 tylenol  overdose and tricyclic overdose  - patient has very flat affect with tangential whispering  - started on home antipsychotics  - did not restart lamictal at this time  - PEC in place in the ED  - Psych consulted, appreciate recs  - delirium precautions  Hx of AKA (above knee amputation), right  Epithelioid sarcoma  - hx noted  Hypertension  Patient's blood pressure range in the last 24 hours was: BP  Min: 117/79  Max: 154/93.The patient's inpatient anti-hypertensive regimen is listed below:  Current Antihypertensives  prazosin capsule 2 mg, Nightly, Oral  cloNIDine tablet 0.1 mg, Every 6 hours PRN, Oral  amLODIPine tablet 5 mg, Daily, Oral    Plan  - BP is controlled, no changes needed to their regimen  Chronic constipation  Per patient's mother pt will self-disimpact to have BM sometimes. Pt initially reported having a recent BM, but pt's mother reports not having had a BM in 3 weeks. Abdomen soft and stool in colon per XR abdomen without concern for ileus. Abdomen soft, non-distended on exam.     - fleet enema administered on 07/19    VTE Risk Mitigation (From admission, onward)      None            Discharge Planning   TANVI: 7/19/2025     Code Status: Full Code   Medical Readiness for Discharge Date:                            Saul Mccray MD  Department of Hospital Medicine   [facility]

## 2025-07-19 NOTE — PLAN OF CARE
[facility] Med Surg  Discharge Final Note    Primary Care Provider: No, Primary Doctor    Expected Discharge Date: 7/19/2025    Final Discharge Note (most recent)       Final Note - 07/19/25 1738          Final Note    Assessment Type Final Discharge Note     Anticipated Discharge Disposition [facility]     What phone number can be called within the next 1-3 days to see how you are doing after discharge? [phone]        Post-Acute Status    Post-Acute Authorization Other     Other Status Community Services   Psy facility    Discharge Delays Ambulance Transport/Facility Transport                     Important Message from Medicare               Discharge Plan A and Plan B have been determined by review of patient's clinical status, future medical and therapeutic needs, and coverage/benefits for post-acute care in coordination with multidisciplinary team members.      Cm reviewed initial assessment POC and is agreeable with transfer to facility. PFC to arrange transportation and give report information. No additional Cm needs at this time.     Magy Artis RN  Case Management  [phone]

## 2025-07-19 NOTE — ED NOTES
Pt at bedside at this time to transfer patient to room 26. New room cleared of all potential hazards, including non-essential equipment, electrical cords, and furniture. Pt's mother also remains at the bedside, calm and behavior appropriate. Patient is awake and alert on the hospital stretcher, with even chest rise and fall noted. Patient remains on cardiac monitor following IV potassium administration. Tech remains at the bedside to maintain 15 minute safety checks and supervised activity.

## 2025-07-19 NOTE — ASSESSMENT & PLAN NOTE
Patient's most recent potassium results are listed below.   Recent Labs     07/18/25  0106 07/18/25  1739 07/19/25  0421   K 2.7* 3.1* 3.7     Plan  - Resolved  - Monitor potassium Every 12 hours  - Patient's hypokalemia is stable  - s/p 100 mEq of K in ED with minimal increase  - giving 2g Mg IV then an additional 40mEq PO of K after

## 2025-07-19 NOTE — PLAN OF CARE
Patient admitted to Hospital Medicine service for hypokalemia which resolved with supplementation. In the ED she was retaining urine and a Washburn was placed. Voiding trial with 210 cc of urine present after 6 hour voiding trial, but no voiding. Suspect poor oral intake (ongoing issue over past few weeks) is a contributing factor to low urine output, though no evidence of kidney injury. Offered enema, but patient declined and will continue with Miralax supplementation. Abdomen soft to palpation.    Patient medically cleared for psychiatric placement.

## 2025-07-19 NOTE — ED NOTES
Nurse at the bedside at this time rounding on patient. Pt's family is also at the bedside, calm and behavior appropriate. Patient is awake and alert on the hospital stretcher, with even chest rise and fall noted. Patient remains on cardiac monitor following IV potassium administration. Tech remains at the bedside to maintain 15 minute safety checks and supervised activity.

## 2025-07-19 NOTE — ED NOTES
Pt at bedside at this time to administer medications and draw blood for an iSTAT procedure. Pt's family is also at the bedside, calm and behavior appropriate. Patient is awake and alert on the hospital stretcher, with even chest rise and fall noted. Patient remains on cardiac monitor following IV potassium administration. Tech remains at the bedside to maintain 15 minute safety checks and supervised activity.     Pt requesting to speak to day shift doctor. MD notified.

## 2025-07-19 NOTE — PLAN OF CARE
07/19/25 1229   Rounds   Attendance Nurse ;Charge nurse   Discharge Plan A Psychiatric hospital   Why the patient remains in the hospital Requires continued medical care   Transition of Care Barriers Mental illness     Patient to transfer to Psy facility when med ready needs to void for transfer to be placed.     Magy Artis RN  Case Management  [phone]

## 2025-07-19 NOTE — HOSPITAL COURSE
Patient admitted with enema offered given reported history of chronic constipation and XR abdomen with stool in colon, but no evidence of bowel obstruction. Patient declined enema and believes she hasn't had a bowel movement because of poor oral intake. Voiding trial attempted and spontaneously voided so no Washburn replaced. Pt reports a number of erratic symptoms and stated that she was having a seizure by shaking her legs while clearly and audibly speaking with provider. Did not wish to transfer to psychiatric facility, but advised that PEC meant she would need to go to facility. No acute issues found on exam and patient advised that ongoing Psychiatric medication titration would likely be most helpful path forward for her.

## 2025-07-19 NOTE — ASSESSMENT & PLAN NOTE
Patient's blood pressure range in the last 24 hours was: BP  Min: 117/79  Max: 154/93.The patient's inpatient anti-hypertensive regimen is listed below:  Current Antihypertensives  prazosin capsule 2 mg, Nightly, Oral  cloNIDine tablet 0.1 mg, Every 6 hours PRN, Oral  amLODIPine tablet 5 mg, Daily, Oral    Plan  - BP is controlled, no changes needed to their regimen

## 2025-07-19 NOTE — SUBJECTIVE & OBJECTIVE
Interval History: Pt refusing meds last night, IV K for replacement with resolution of hypokalemia. Pt's mother expressed concern about chronic constipation (previously unmentioned yesterday) and enema administered. Voiding trial attempted. Psychiatry evaluation today.     Review of Systems  Objective:     Vital Signs (Most Recent):  Temp: 97.8 °F (36.6 °C) (07/19/25 1115)  Pulse: 88 (07/19/25 1115)  Resp: 17 (07/19/25 1115)  BP: (!) 131/90 (07/19/25 1115)  SpO2: 99 % (07/19/25 1115) Vital Signs (24h Range):  Temp:  [97.7 °F (36.5 °C)-98.3 °F (36.8 °C)] 97.8 °F (36.6 °C)  Pulse:  [] 88  Resp:  [16-20] 17  SpO2:  [96 %-99 %] 99 %  BP: (117-154)/(79-93) 131/90     Weight: 49.9 kg (110 lb 0.2 oz)  Body mass index is 18.31 kg/m².    Intake/Output Summary (Last 24 hours) at 7/19/2025 1213  Last data filed at 7/19/2025 0400  Gross per 24 hour   Intake 150 ml   Output 1300 ml   Net -1150 ml         Physical Exam  Vitals and nursing note reviewed.   Constitutional:       General: She is not in acute distress.     Appearance: Normal appearance. She is not ill-appearing.   HENT:      Head: Normocephalic and atraumatic.      Right Ear: External ear normal.      Left Ear: External ear normal.   Eyes:      Extraocular Movements: Extraocular movements intact.      Conjunctiva/sclera: Conjunctivae normal.      Pupils: Pupils are equal, round, and reactive to light.   Cardiovascular:      Rate and Rhythm: Normal rate and regular rhythm.      Pulses: Normal pulses.      Heart sounds: Normal heart sounds. No murmur heard.  Pulmonary:      Effort: Pulmonary effort is normal. No respiratory distress.      Breath sounds: Normal breath sounds.   Abdominal:      General: Abdomen is flat. Bowel sounds are normal. There is no distension.      Palpations: Abdomen is soft.      Tenderness: There is no abdominal tenderness.   Musculoskeletal:         General: Normal range of motion.      Cervical back: Normal range of motion and neck  supple. No tenderness.      Right lower leg: No edema.      Left lower leg: No edema.   Skin:     General: Skin is warm and dry.      Capillary Refill: Capillary refill takes less than 2 seconds.      Coloration: Skin is not jaundiced.   Neurological:      Mental Status: She is alert and oriented to person, place, and time. Mental status is at baseline.   Psychiatric:         Mood and Affect: Mood normal. Affect is flat.         Behavior: Behavior normal. Behavior is cooperative.         Thought Content: Thought content does not include homicidal or suicidal ideation.               Significant Labs: All pertinent labs within the past 24 hours have been reviewed.    Significant Imaging: I have reviewed all pertinent imaging results/findings within the past 24 hours.

## 2025-07-19 NOTE — CONSULTS
"Emergency Psychiatry Consult Note    7/19/2025 3:22 PM  Val Gil  MRN: [id number]    Chief Complaint / Reason for Consult: paranoia and psychosis     SUBJECTIVE     History of Present Illness:   Val Gil is a 44 y.o. female with a past psychiatric history of depression, currently presenting with paranoia. Emergency Psychiatry was originally consulted for psychosis.    Per ED RN(s):  Val Gil, a 44 y.o. female presents to the ED w/ complaint of potential auditory hallucinations and paranoia. Pt was brought in by her mother, who reports that patient has been "hearing things" and speaking at a lower volume than normal. Pt was recently taken off of her Lamictal, which she has taken successfully for 20 years. Pt's mother reports worsening symptoms since the medication was stopped. Pt denies AH, SI, and HI. Pt noted to be speaking at low volume     Per ED MD:  Val Gil is a 44 y.o. female with a medical history as below presents to the emergency department with a chief complaint of auditory hallucinations.  Patient has a long psych history and takes a variety of scheduled antipsychotic and mood stabilizing medications.  Recently she was taken off of Lamictal which he has been on successfully for almost 20 years.  Since that time her mother's reported worsening symptoms.  That has include auditory hallucinations, paranoia, food aversion with significant weight loss, lack of personal hygiene, and flattening of affect.  Patient endorses general food aversion secondary to nausea and generalized weakness.  She denies HI or SI.  He does have an attempt at self-harm in the past secondary to intentional Tylenol overdose.  Patient has had 2 recent stays inpatient psychiatric facility secondary to PECs placed in our emergency department.     Per Psychiatry:  Upon initiation of interview, pt was speaking in a notably low tone. She did endorse voices but downplays this (contrasted to mom's " account) elevating her lack of sleep as the primary issue at hand rather than voices. She says that the voices began 1 month ago but unable to describe in detail the nature of these voices. She states that she does not feel threatened by the voices - that is - that the voices will harm her.     Discussed with patient titration of Seroquel, initiation of Risperdal, and re-initiation of Lamictal. Patient and mother toward the end of interview were amenable though reluctantly to the above recommended medication changes.     Collateral:   Yes - mother at bedside states that patient declined from psychiatric standpoint after her Lamictal was discontinued and also cites myriad medication side effects from other psychotropic medications ranging from EPS to drowsiness. Desires re-initiation of Lamictal.     Psychiatric Review of Systems:  sleep: no  appetite: no  weight: no  energy/anergy: no  interest/pleasure/anhedonia: no  somatic symptoms: no  libido: no  anxiety/panic: no  guilty/hopelessness: no  concentration: no  S.I.B.s/risky behavior: no  Suicidal ideation: no  Homicidal ideation: no  Hallucinations: no  Delusions: no    Medical Review Of Systems:  NA    Psychiatric History:  Diagnose(s): Yes - depression   Previous Medication Trials: Yes - zyprexa, abilify, thorazine, haldol   Previous Psychiatric Hospitalizations: Yes - recently discharged from [facility]   Family Psychiatric History: [facility]  Outpatient Psychiatrist: [facility]  Outpatient Therapist: [facility]    Suicide/Violence Risk Assessment:  Current/active suicidal ideation/plan/intent: No  Previous suicide attempts: No  Current/active homicidal ideation/plan/intent: No  History of threats/arrests associated with violent conduct - No  Access to firearms/lethal weapons - No    Social History:  Focused interview tailored to illicit information regarding symptoms to ascertain need for immediate medication changes detailed as titration of Seroquel, initiation of Risperdal, and  initiation of low dose Lamictal     Substance Abuse History:  Patient was on oxycotin following her amputation and presented in the past to [place] for opioid use was previously on subutex now discontinued     Legal History:  NA    Psychosocial Factors:  Focused interview tailored to illicit information regarding symptoms to ascertain need for immediate medication changes detailed as titration of Seroquel, initiation of Risperdal, and initiation of low dose Lamictal     Scheduled Meds:   amLODIPine  5 mg Oral Daily    gabapentin  300 mg Oral TID    multivitamin  1 tablet Oral Daily    pantoprazole  40 mg Oral Daily    polyethylene glycol  17 g Oral Daily    potassium bicarbonate  40 mEq Oral Once    prazosin  2 mg Oral QHS    QUEtiapine  200 mg Oral QHS    sodium phosphates  1 enema Rectal Once    sucralfate  1 g Oral QID (AC & HS)     Psychotherapeutics (From admission, onward)      Start     Stop Route Frequency Ordered    07/18/25 2100  QUEtiapine tablet 200 mg         -- Oral Nightly 07/18/25 1414    07/18/25 1412  diazePAM tablet 10 mg         -- Oral 2 times daily PRN 07/18/25 1414            PRN Meds:    Current Facility-Administered Medications:     acetaminophen, 650 mg, Oral, Q6H PRN    aluminum-magnesium hydroxide-simethicone, 30 mL, Oral, Q6H PRN    cloNIDine, 0.1 mg, Oral, Q6H PRN    diazePAM, 10 mg, Oral, BID PRN    dicyclomine, 10 mg, Oral, QID PRN    hydrOXYzine pamoate, 25 mg, Oral, Q8H PRN    loperamide, 2 mg, Oral, QID PRN    melatonin, 6 mg, Oral, Nightly PRN    ondansetron, 8 mg, Oral, Q6H PRN    prochlorperazine, 5 mg, Intramuscular, Q6H PRN    tiZANidine, 4 mg, Oral, Q8H PRN    Home Meds:  Prior to Admission medications    Medication Sig Start Date End Date Taking? Authorizing Provider   amLODIPine (NORVASC) 5 MG tablet Take 1 tablet (5 mg total) by mouth once daily. 7/15/25 8/14/25  Bev Nevarez PA-C   diazePAM (VALIUM) 10 MG Tab TAKE 1 TABLET BY MOUTH DAILY AS NEEDED FOR ANXIETY  1/19/18   Provider, Historical   famotidine (PEPCID) 20 MG tablet Take 20 mg by mouth every morning. 6/7/19   Provider, Historical   gabapentin (NEURONTIN) 300 MG capsule Take 1 capsule (300 mg total) by mouth 3 (three) times daily. 7/14/25 8/13/25  Bev Nevarez PA-C   LORazepam (ATIVAN) 1 MG tablet Take 1 tablet (1 mg total) by mouth 3 (three) times daily as needed for Anxiety. 1/4/18   Bahman Yee MD   ondansetron (ZOFRAN-ODT) 8 MG TbDL Take 8 mg by mouth once daily. 6/8/19   Provider, Historical   prazosin (MINIPRESS) 2 MG Cap Take 1 capsule (2 mg total) by mouth every evening. 7/14/25 8/13/25  Bev Nevarez PA-C   promethazine (PHENERGAN) 25 MG tablet Take 25 mg by mouth daily as needed for Nausea.  12/31/15   Provider, Historical   QUEtiapine (SEROQUEL) 200 MG Tab Take 1 tablet (200 mg total) by mouth every evening. 7/14/25 8/13/25  Bev Nevarez PA-C   senna-docusate (PERICOLACE) 8.6-50 mg per tablet Take 2 tablets by mouth once daily. 6/13/25   Lakeisha Rdz MD   valACYclovir (VALTREX) 500 MG tablet Valtrex 500 mg tablet   Take 1 tablet twice a day by oral route for 5 days.    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    Anxiety     Bone cancer     Depression     Hypertension 7/18/2025     Past Surgical History:   Procedure Laterality Date    LEG AMPUTATION AT HIP Left 7/2015       OBJECTIVE     Vital Signs:  Temp:  [97.7 °F (36.5 °C)-98.3 °F (36.8 °C)]   Pulse:  [84-99]   Resp:  [16-20]   BP: (117-141)/(79-91)   SpO2:  [96 %-99 %]  "    Mental Status Exam:  Appearance: unremarkable, age appropriate  Level of Consciousness: awake and alert   Behavior/Cooperation: normal, cooperative  Psychomotor: within normal limits   Speech: low volume speech  Language: english, fluent  Orientation: grossly intact  Mood: "neutral"  Affect: normal  Thought Process: superficially appears logical but inconsistent with mom's account; patient guarded and difficult to illicit information about voices; if indeed mom's account of voices are true patient downplays AH focusing on lack of sleep at the forefront of presentation  Associations: normal and logical  Thought Content: normal, no suicidality, no homicidality, delusions, or paranoia  Perceptual Disturbances: hallucinations:  auditory  Attention Span/Concentration: intact  Fund of Knowledge: Intact  Memory: Intact  Insight: intact  Judgment: behavior is adequate to circumstances    Laboratory Data:  Recent Results (from the past 48 hours)   Comprehensive metabolic panel    Collection Time: 07/18/25  1:06 AM   Result Value Ref Range    Sodium 142 136 - 145 mmol/L    Potassium 2.7 (LL) 3.5 - 5.1 mmol/L    Chloride 103 95 - 110 mmol/L    CO2 23 23 - 29 mmol/L    Glucose 91 70 - 110 mg/dL    BUN 9 6 - 20 mg/dL    Creatinine 0.7 0.5 - 1.4 mg/dL    Calcium 9.6 8.7 - 10.5 mg/dL    Protein Total 7.7 6.0 - 8.4 gm/dL    Albumin 4.6 3.5 - 5.2 g/dL    Bilirubin Total 0.6 0.1 - 1.0 mg/dL    ALP 67 40 - 150 unit/L    AST 22 11 - 45 unit/L    ALT 15 10 - 44 unit/L    Anion Gap 16 8 - 16 mmol/L    eGFR >60 >60 mL/min/1.73/m2   Ethanol    Collection Time: 07/18/25  1:06 AM   Result Value Ref Range    Alcohol, Serum <10 <10 mg/dL   Acetaminophen level    Collection Time: 07/18/25  1:06 AM   Result Value Ref Range    Acetaminophen Level <3.0 (L) 10.0 - 20.0 ug/ml   Salicylate level    Collection Time: 07/18/25  1:06 AM   Result Value Ref Range    Salicylate Level <5.0 (L) 15.0 - 30.0 mg/dL   TSH    Collection Time: 07/18/25  1:06 AM "   Result Value Ref Range    TSH 0.574 0.400 - 4.000 uIU/mL   CBC with Differential    Collection Time: 07/18/25  1:06 AM   Result Value Ref Range    WBC 4.56 3.90 - 12.70 K/uL    RBC 4.59 4.00 - 5.40 M/uL    HGB 13.1 12.0 - 16.0 gm/dL    HCT 37.8 37.0 - 48.5 %    MCV 82 82 - 98 fL    MCH 28.5 27.0 - 31.0 pg    MCHC 34.7 32.0 - 36.0 g/dL    RDW 14.8 (H) 11.5 - 14.5 %    Platelet Count 454 (H) 150 - 450 K/uL    MPV 10.2 9.2 - 12.9 fL    Nucleated RBC 0 <=0 /100 WBC    Neut % 52.2 38 - 73 %    Lymph % 35.1 18 - 48 %    Mono % 11.0 4 - 15 %    Eos % 0.9 <=8 %    Basophil % 0.4 <=1.9 %    Imm Grans % 0.4 0.0 - 0.5 %    Neut # 2.38 1.8 - 7.7 K/uL    Lymph # 1.60 1 - 4.8 K/uL    Mono # 0.50 0.3 - 1 K/uL    Eos # 0.04 <=0.5 K/uL    Baso # 0.02 <=0.2 K/uL    Imm Grans # 0.02 0.00 - 0.04 K/uL   Magnesium    Collection Time: 07/18/25  1:06 AM   Result Value Ref Range    Magnesium  1.7 1.6 - 2.6 mg/dL   EKG 12-lead    Collection Time: 07/18/25  2:28 AM   Result Value Ref Range    QRS Duration 88 ms    [facility] QTC Calculation 497 ms   EKG 12-lead    Collection Time: 07/18/25  3:26 AM   Result Value Ref Range    QRS Duration 86 ms    [facility] QTC Calculation 472 ms   ISTAT PROCEDURE    Collection Time: 07/18/25  6:50 AM   Result Value Ref Range    POC Glucose 91 70 - 110 mg/dL    POC BUN 10 6 - 30 mg/dL    POC Creatinine 0.5 0.5 - 1.4 mg/dL    POC Sodium 134 (L) 136 - 145 mmol/L    POC Potassium 7.1 (HH) 3.5 - 5.1 mmol/L    POC Chloride 108 95 - 110 mmol/L    POC TCO2 (MEASURED) 21 (L) 23 - 29 mmol/L    POC Ionized Calcium 0.92 (L) 1.06 - 1.42 mmol/L    POC Hematocrit 33 (L) 36 - 54 %PCV    Sample RAHEEL    ISTAT PROCEDURE    Collection Time: 07/18/25  7:07 AM   Result Value Ref Range    POC Glucose 90 70 - 110 mg/dL    POC BUN 8 6 - 30 mg/dL    POC Creatinine 0.5 0.5 - 1.4 mg/dL    POC Sodium 140 136 - 145 mmol/L    POC Potassium 2.9 (L) 3.5 - 5.1 mmol/L    POC Chloride 105 95 - 110 mmol/L    POC TCO2 (MEASURED) 19 (L) 23 - 29 mmol/L    POC  Ionized Calcium 1.16 1.06 - 1.42 mmol/L    POC Hematocrit 34 (L) 36 - 54 %PCV    Sample RAHEEL    POCT glucose    Collection Time: 07/18/25 12:53 PM   Result Value Ref Range    POCT Glucose 97 70 - 110 mg/dL   Urinalysis, Reflex to Urine Culture Urine, Clean Catch    Collection Time: 07/18/25  1:47 PM    Specimen: Urine, Clean Catch   Result Value Ref Range    Color, UA Yellow Straw, Lisa, Yellow, Light-Orange    Appearance, UA Clear Clear    pH, UA 6.0 5.0 - 8.0    Spec Grav UA 1.020 1.005 - 1.030    Protein, UA 1+ (A) Negative    Glucose, UA Negative Negative    Ketones, UA 3+ (A) Negative    Bilirubin, UA Negative Negative    Blood, UA Negative Negative    Nitrites, UA Negative Negative    Urobilinogen, UA Negative <2.0 EU/dL    Leukocyte Esterase, UA Negative Negative   Drug screen panel, emergency    Collection Time: 07/18/25  1:47 PM   Result Value Ref Range    Benzodiazepine, Urine Presumptive Positive (A) Negative    Methadone, Urine Negative Negative    Cocaine, Urine Negative Negative    Opiates, Urine Negative Negative    Barbiturates, Urine Negative Negative    Amphetamines, Urine Negative Negative    THC Presumptive Positive (A) Negative    Phencyclidine, Urine Negative Negative    Urine Creatinine 183.0 15.0 - 325.0 mg/dL   [place] URINE HOLD    Collection Time: 07/18/25  1:47 PM   Result Value Ref Range    Extra Tube Hold for add-ons.    Urinalysis Microscopic    Collection Time: 07/18/25  1:47 PM   Result Value Ref Range    RBC, UA 1 0 - 4 /HPF    WBC, UA 1 0 - 5 /HPF    Bacteria, UA Rare None, Rare, Occasional /HPF    Squamous Epithelial Cells, UA <1 <=5 /HPF    Hyaline Casts, UA 0 0 - 1 /LPF    Microscopic Comment     ISTAT PROCEDURE    Collection Time: 07/18/25  2:00 PM   Result Value Ref Range    POC Glucose 90 70 - 110 mg/dL    POC BUN 5 (L) 6 - 30 mg/dL    POC Creatinine 0.4 (L) 0.5 - 1.4 mg/dL    POC Sodium 142 136 - 145 mmol/L    POC Potassium 3.1 (L) 3.5 - 5.1 mmol/L    POC Chloride 105 95 -  "110 mmol/L    POC TCO2 (MEASURED) 20 (L) 23 - 29 mmol/L    POC Ionized Calcium 1.16 1.06 - 1.42 mmol/L    POC Hematocrit 35 (L) 36 - 54 %PCV    Sample RAHEEL    Basic metabolic panel    Collection Time: 07/18/25  5:39 PM   Result Value Ref Range    Sodium 142 136 - 145 mmol/L    Potassium 3.1 (L) 3.5 - 5.1 mmol/L    Chloride 107 95 - 110 mmol/L    CO2 19 (L) 23 - 29 mmol/L    Glucose 83 70 - 110 mg/dL    BUN 6 6 - 20 mg/dL    Creatinine 0.5 0.5 - 1.4 mg/dL    Calcium 9.2 8.7 - 10.5 mg/dL    Anion Gap 16 8 - 16 mmol/L    eGFR >60 >60 mL/min/1.73/m2   Basic metabolic panel    Collection Time: 07/19/25  4:21 AM   Result Value Ref Range    Sodium 138 136 - 145 mmol/L    Potassium 3.7 3.5 - 5.1 mmol/L    Chloride 109 95 - 110 mmol/L    CO2 19 (L) 23 - 29 mmol/L    Glucose 68 (L) 70 - 110 mg/dL    BUN 6 6 - 20 mg/dL    Creatinine 0.5 0.5 - 1.4 mg/dL    Calcium 8.4 (L) 8.7 - 10.5 mg/dL    Anion Gap 10 8 - 16 mmol/L    eGFR >60 >60 mL/min/1.73/m2   HCG, Quantitative    Collection Time: 07/19/25 12:24 PM   Result Value Ref Range    Beta HCG Quant <2.42 See Text mIU/mL      No results found for: "PHENYTOIN", "PHENOBARB", "VALPROATE", "CBMZ"    Imaging:  Imaging Results    None          ASSESSMENT     Val Gil is a 44 y.o. female with a past psychiatric history of depression, currently presenting with acute psychosis evidenced by paranoia and auditory hallucinations.  Emergency Psychiatry was originally consulted for auditory hallucinations.    IMPRESSION  This patient is experiencing paranoia and voices that warrant medication changes at this time. The other complaint is lack of sleep. Since patient is already on Seroquel for insomnia will recommend titration from 200mg qhs to 300mg qhs and monitor for symptoms. For psychosis, will initiate Risperdal 2mg nightly for psychosis. Can also reinitiate Lamictal 25mg and slowly titrate.     RECOMMENDATION(S)      1. Scheduled Medication(s):  Prazosin 2mg nightly   Seroquel " 200mg nightly   Gabapentin 300mg TID     2. PRN Medication(s):  Hydroxyzine Pamoate 25mg q8h PRN  Diazepam 10mg BID PRN     3. Legal Status/Precaution(s):  Continue PEC at this time as the patient is currently gravely disabled evidenced by persistent paranoia and psychosis. Seek inpatient bed for patient safety and stabilization when/if medically cleared by the ER MD. Continue to observe patient's behavior while in the ER and reassess the patient daily until placement is found.    Precautions:     In cases of emergency, daily coverage provided by Acute/ED Psych MD, NP, or SW, with associated contact numbers listed in the [facility] On Call Schedule.    Case discussed with emergency psychiatry staff:     Dejuan Villanueva MD  [employer] Psychiatry PGY-II

## 2025-07-20 VITALS
DIASTOLIC BLOOD PRESSURE: 90 MMHG | TEMPERATURE: 98 F | RESPIRATION RATE: 16 BRPM | BODY MASS INDEX: 18.33 KG/M2 | OXYGEN SATURATION: 99 % | WEIGHT: 110 LBS | SYSTOLIC BLOOD PRESSURE: 146 MMHG | HEART RATE: 83 BPM | HEIGHT: 65 IN

## 2025-07-20 PROBLEM — E46 MALNOURISHED: Status: ACTIVE | Noted: 2025-07-20

## 2025-07-20 PROCEDURE — G0378 HOSPITAL OBSERVATION PER HR: HCPCS

## 2025-07-20 PROCEDURE — 25000003 PHARM REV CODE 250: Performed by: PHYSICIAN ASSISTANT

## 2025-07-20 RX ADMIN — HYDROXYZINE PAMOATE 25 MG: 25 CAPSULE ORAL at 03:07

## 2025-07-20 RX ADMIN — AMLODIPINE BESYLATE 5 MG: 5 TABLET ORAL at 09:07

## 2025-07-20 RX ADMIN — TIZANIDINE 4 MG: 4 TABLET ORAL at 09:07

## 2025-07-20 RX ADMIN — PANTOPRAZOLE SODIUM 40 MG: 40 TABLET, DELAYED RELEASE ORAL at 09:07

## 2025-07-20 RX ADMIN — GABAPENTIN 300 MG: 300 CAPSULE ORAL at 09:07

## 2025-07-20 RX ADMIN — ACETAMINOPHEN 650 MG: 325 TABLET ORAL at 09:07

## 2025-07-20 RX ADMIN — POLYETHYLENE GLYCOL 3350 17 G: 17 POWDER, FOR SOLUTION ORAL at 09:07

## 2025-07-20 RX ADMIN — ONDANSETRON 8 MG: 8 TABLET, ORALLY DISINTEGRATING ORAL at 03:07

## 2025-07-20 RX ADMIN — ONDANSETRON 8 MG: 8 TABLET, ORALLY DISINTEGRATING ORAL at 09:07

## 2025-07-20 RX ADMIN — THERA TABS 1 TABLET: TAB at 09:07

## 2025-07-20 NOTE — PLAN OF CARE
Problem: Violence Risk or Actual  Goal: Anger and Impulse Control  Outcome: Met     Problem: Infection  Goal: Absence of Infection Signs and Symptoms  Outcome: Met     Problem: Adult Inpatient Plan of Care  Goal: Plan of Care Review  Outcome: Met  Goal: Patient-Specific Goal (Individualized)  Outcome: Met  Goal: Absence of Hospital-Acquired Illness or Injury  Outcome: Met  Goal: Optimal Comfort and Wellbeing  Outcome: Met  Goal: Readiness for Transition of Care  Outcome: Met     Problem: Skin Injury Risk Increased  Goal: Skin Health and Integrity  Outcome: Met      Patient transferred to [facility] with belongings bag and wheelchair. Report given to Lia [phone] @1827 07/19/2025. Mother notified patient left.

## 2025-07-20 NOTE — NURSING
"[facility] arrived around 2100 to transport pt to [place], DC orders in place and IV removed, as pt was about to get onto stretcher the pt noted that she had to urinate, and when she tried to urinate she could not, but felt the urge to. Charge nurse notified and at bedside, MD notified, bladder scan showed 274, MD advised not to straight cath at this time. Advised encourage voiding and bladder scan again at 0000, transport cancelled and pt encouraged to void. Pt refused medication at this time d/t "not feeling well" at this time. Pt Mother encouraging pt to receive enema for constipation, however pt refused enema at this time. Continuing Murelax supplementation as ordered. No further questions or concerns noted from pt, 1:1 sitter monitoring at bedside.   "

## 2025-07-20 NOTE — ASSESSMENT & PLAN NOTE
Patient's blood pressure range in the last 24 hours was: BP  Min: 130/80  Max: 146/90.The patient's inpatient anti-hypertensive regimen is listed below:  Current Antihypertensives  prazosin (MINIPRESS) capsule, Nightly, Oral    Plan  - BP is controlled, no changes needed to their regimen

## 2025-07-20 NOTE — SUBJECTIVE & OBJECTIVE
Interval History: [facility] delayed by late transport pickup, patient urinating, and then patient refusing to leave to facility. Discharged the next morning in good condition.     Review of Systems  Objective:     Vital Signs (Most Recent):  Temp: 98.2 °F (36.8 °C) (07/20/25 0819)  Pulse: 83 (07/20/25 0819)  Resp: 16 (07/20/25 0819)  BP: (!) 146/90 (07/20/25 0819)  SpO2: 99 % (07/20/25 0819) Vital Signs (24h Range):  Temp:  [98.1 °F (36.7 °C)-98.2 °F (36.8 °C)] 98.2 °F (36.8 °C)  Pulse:  [76-85] 83  Resp:  [16-19] 16  SpO2:  [96 %-99 %] 99 %  BP: (130-146)/(80-92) 146/90     Weight: 49.9 kg (110 lb 0.2 oz)  Body mass index is 18.31 kg/m².    Intake/Output Summary (Last 24 hours) at 7/20/2025 1641  Last data filed at 7/20/2025 1037  Gross per 24 hour   Intake 100 ml   Output 650 ml   Net -550 ml         Physical Exam  Vitals and nursing note reviewed.   Constitutional:       General: She is not in acute distress.     Appearance: Normal appearance. She is not ill-appearing.   HENT:      Head: Normocephalic and atraumatic.      Right Ear: External ear normal.      Left Ear: External ear normal.   Eyes:      Extraocular Movements: Extraocular movements intact.      Conjunctiva/sclera: Conjunctivae normal.      Pupils: Pupils are equal, round, and reactive to light.   Cardiovascular:      Rate and Rhythm: Normal rate and regular rhythm.      Pulses: Normal pulses.      Heart sounds: Normal heart sounds. No murmur heard.  Pulmonary:      Effort: Pulmonary effort is normal. No respiratory distress.      Breath sounds: Normal breath sounds.   Abdominal:      General: Abdomen is flat. Bowel sounds are normal. There is no distension.      Palpations: Abdomen is soft.      Tenderness: There is no abdominal tenderness.   Musculoskeletal:         General: Normal range of motion.      Cervical back: Normal range of motion and neck supple. No tenderness.      Right lower leg: No edema.      Left lower leg: No edema.   Skin:      General: Skin is warm and dry.      Capillary Refill: Capillary refill takes less than 2 seconds.      Coloration: Skin is not jaundiced.   Neurological:      Mental Status: She is alert and oriented to person, place, and time. Mental status is at baseline.   Psychiatric:         Mood and Affect: Mood normal. Affect is flat.         Behavior: Behavior is uncooperative.         Thought Content: Thought content does not include homicidal or suicidal ideation.               Significant Labs: All pertinent labs within the past 24 hours have been reviewed.    Significant Imaging: I have reviewed all pertinent imaging results/findings within the past 24 hours.

## 2025-07-20 NOTE — DISCHARGE SUMMARY
[facility] Medicine  Discharge Summary      Patient Name: Val Gil  MRN: [id number]  BECKIE: [id number]  Patient Class: OP- Observation  Admission Date: 7/17/2025  Hospital Length of Stay: 0 days  Discharge Date and Time: 07/20/2025 4:43 PM  Attending Physician: Faby att. providers found   Discharging Provider: Saul Mccray MD  Primary Care Provider: Faby Primary Doctor  [facility] Medicine Team: [facility] Q Sual Mccray MD  Primary Care Team: [facility]    HPI:   Val Gil is a 44 y.o. female with PMHx significant for sarcoma, R AKA,  IVDU, depression, anemia admitted to hospital medicine for hypokalemia. Patient presented with her mother this morning with complaint of auditory hallucinations. Patient has a long psych history and takes a variety of scheduled antipsychotic and mood stabilizing medications.  Recently she was taken off of Lamictal which she has been on successfully for almost 20 years.  Mother reports that since stopping medications, she has had worsening auditory hallucinations, paranoia, food aversion. Mother endorses significant weight loss, lack of personal hygiene, and flattening of affect. Patient also endorses general food aversion secondary to nausea and generalized weakness.  She denies HI or SI, but has a hx of SI with tylenol overdose. Patient has had 2 recent stays inpatient psychiatric facility. Patient and mother also endorse some dysuria and decreased urine output. Denies fever/chills, diaphoresis, lightheadedness, HA, SOB, CP, cough, congestion, abdominal pain, n/v/d, changes in BMs, numbness/tingling, weakness.     In the ED, AFVSS aside from mild tachycardia to 111. K 2.7. Remaining labs are largely unremarkable. UA and urine tox pending. Patient given several doses of potassium totaling 100 mEq, midazolam x1, and 1.5L IVF. Repeat K 3.1. PEC placed in the ED.     * No surgery found *      Hospital Course:   Patient admitted with enema  offered given reported history of chronic constipation and XR abdomen with stool in colon, but no evidence of bowel obstruction. Patient declined enema and believes she hasn't had a bowel movement because of poor oral intake. Voiding trial attempted and spontaneously voided so no [place] replaced. Pt reports a number of erratic symptoms and stated that she was having a seizure by shaking her legs while clearly and audibly speaking with provider. Did not wish to transfer to psychiatric facility, but advised that PEC meant she would need to go to facility. No acute issues found on exam and patient advised that ongoing Psychiatric medication titration would likely be most helpful path forward for her.     Goals of Care Treatment Preferences:  Code Status: Full Code    Health care agent: Dilip Reis (mother)  Health care agent number: [phone] cell                 Interval History: EDDIE SKELTON delayed by late transport pickup, patient urinating, and then patient refusing to leave to facility. Discharged the next morning in good condition.     Review of Systems  Objective:     Vital Signs (Most Recent):  Temp: 98.2 °F (36.8 °C) (07/20/25 0819)  Pulse: 83 (07/20/25 0819)  Resp: 16 (07/20/25 0819)  BP: (!) 146/90 (07/20/25 0819)  SpO2: 99 % (07/20/25 0819) Vital Signs (24h Range):  Temp:  [98.1 °F (36.7 °C)-98.2 °F (36.8 °C)] 98.2 °F (36.8 °C)  Pulse:  [76-85] 83  Resp:  [16-19] 16  SpO2:  [96 %-99 %] 99 %  BP: (130-146)/(80-92) 146/90     Weight: 49.9 kg (110 lb 0.2 oz)  Body mass index is 18.31 kg/m².    Intake/Output Summary (Last 24 hours) at 7/20/2025 1641  Last data filed at 7/20/2025 1037  Gross per 24 hour   Intake 100 ml   Output 650 ml   Net -550 ml         Physical Exam  Vitals and nursing note reviewed.   Constitutional:       General: She is not in acute distress.     Appearance: Normal appearance. She is not ill-appearing.   HENT:      Head: Normocephalic and atraumatic.      Right Ear: External ear  normal.      Left Ear: External ear normal.   Eyes:      Extraocular Movements: Extraocular movements intact.      Conjunctiva/sclera: Conjunctivae normal.      Pupils: Pupils are equal, round, and reactive to light.   Cardiovascular:      Rate and Rhythm: Normal rate and regular rhythm.      Pulses: Normal pulses.      Heart sounds: Normal heart sounds. No murmur heard.  Pulmonary:      Effort: Pulmonary effort is normal. No respiratory distress.      Breath sounds: Normal breath sounds.   Abdominal:      General: Abdomen is flat. Bowel sounds are normal. There is no distension.      Palpations: Abdomen is soft.      Tenderness: There is no abdominal tenderness.   Musculoskeletal:         General: Normal range of motion.      Cervical back: Normal range of motion and neck supple. No tenderness.      Right lower leg: No edema.      Left lower leg: No edema.   Skin:     General: Skin is warm and dry.      Capillary Refill: Capillary refill takes less than 2 seconds.      Coloration: Skin is not jaundiced.   Neurological:      Mental Status: She is alert and oriented to person, place, and time. Mental status is at baseline.   Psychiatric:         Mood and Affect: Mood normal. Affect is flat.         Behavior: Behavior is uncooperative.         Thought Content: Thought content does not include homicidal or suicidal ideation.               Significant Labs: All pertinent labs within the past 24 hours have been reviewed.    Significant Imaging: I have reviewed all pertinent imaging results/findings within the past 24 hours.     Consults:   Consults (From admission, onward)          Status Ordering Provider     Inpatient consult to Psychiatry  Once        Provider:  (Not yet assigned)    Completed EVETTE ZULUAGA            Assessment & Plan  Hypokalemia  Patient's most recent potassium results are listed below.   Recent Labs     07/18/25  0106 07/18/25  1739 07/19/25  0421   K 2.7* 3.1* 3.7     Plan  - Resolved  -  Monitor potassium Every 12 hours  - Patient's hypokalemia is stable  - s/p 100 mEq of K in ED with minimal increase  - giving 2g Mg IV then an additional 40mEq PO of K after  Anemia, chronic disease  Anemia is likely due to chronic disease Most recent hemoglobin and hematocrit are listed below.  Recent Labs     07/18/25  0106 07/18/25  0650 07/18/25  0707 07/18/25  1400   HGB 13.1  --   --   --    HCT 37.8 33* 34* 35*     Plan  - Monitor serial CBC: Daily  - Transfuse PRBC if patient becomes hemodynamically unstable, symptomatic or H/H drops below 7/21.  - Patient has not received any PRBC transfusions to date  - Patient's anemia is currently stable  Benzodiazepine dependence  - prn diazepam and hydroxyzine    Auditory hallucinations  Anxiety  Adjustment disorder with mixed anxiety and depressed mood  Inadequate oral intake  Weakness generalized  - mother reports increase in auditory hallucinations, paranoid, and food aversion since stopping lamictal  - has had 2 recent psychiatric admissions  - hx of SI 2/2 tylenol overdose and tricyclic overdose  - patient has very flat affect with tangential whispering  - started on home antipsychotics  - did not restart lamictal at this time  - PEC in place in the ED  - Psych consulted, appreciate recs  - delirium precautions  Hx of AKA (above knee amputation), right  Epithelioid sarcoma  - hx noted  Hypertension  Patient's blood pressure range in the last 24 hours was: BP  Min: 130/80  Max: 146/90.The patient's inpatient anti-hypertensive regimen is listed below:  Current Antihypertensives  prazosin (MINIPRESS) capsule, Nightly, Oral    Plan  - BP is controlled, no changes needed to their regimen  Chronic constipation  Per patient's mother pt will self-disimpact to have BM sometimes. Pt initially reported having a recent BM, but pt's mother reports not having had a BM in 3 weeks. Abdomen soft and stool in colon per XR abdomen without concern for ileus. Abdomen soft,  non-distended on exam.     - fleet enema administered on 07/19    Final Active Diagnoses:    Diagnosis Date Noted POA    PRINCIPAL PROBLEM:  Hypokalemia [E87.6] 07/18/2025 Yes    Chronic constipation [K59.09] 07/19/2025 No    Auditory hallucinations [R44.0] 07/18/2025 Yes    Hx of AKA (above knee amputation), right [Z89.611] 07/18/2025 Not Applicable    Hypertension [I10] 07/18/2025 Yes    Inadequate oral intake [R63.8] 07/02/2025 Yes    Anxiety [F41.9] 12/29/2017 Yes    Benzodiazepine dependence [F13.20] 12/29/2017 Yes    Weakness generalized [R53.1] 08/05/2015 Yes    Adjustment disorder with mixed anxiety and depressed mood [F43.23] 07/03/2015 Yes    Anemia, chronic disease [D63.8] 06/26/2015 Yes    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    Significant Diagnostic Studies: N/A    Pending Diagnostic Studies:       None           Medications:  Reconciled Home Medications:      Medication List        START taking these medications      hydrOXYzine pamoate 25 MG Cap  Commonly known as: VISTARIL  Take 1 capsule (25 mg total) by mouth every 8 (eight) hours as needed.     polyethylene glycol 17 gram/dose powder  Commonly known as: GLYCOLAX  Take 17 g by mouth once daily.            CHANGE how you take these medications      diazePAM 10 MG Tab  Commonly known as: VALIUM  Take 1 tablet (10 mg total) by mouth 2 (two) times daily as needed for Anxiety (1st choice for anxiety).  What changed: See the new instructions.            CONTINUE taking these medications      amLODIPine 5 MG tablet  Commonly known as: NORVASC  Take 1 tablet (5 mg total) by mouth once daily.     famotidine 20 MG tablet  Commonly known as: PEPCID  Take 20 mg by mouth every morning.     gabapentin 300 MG capsule  Commonly known as: NEURONTIN  Take 1 capsule (300 mg  total) by mouth 3 (three) times daily.     ondansetron 8 MG Tbdl  Commonly known as: ZOFRAN-ODT  Take 8 mg by mouth once daily.     prazosin 2 MG Cap  Commonly known as: MINIPRESS  Take 1 capsule (2 mg total) by mouth every evening.     promethazine 25 MG tablet  Commonly known as: PHENERGAN  Take 25 mg by mouth daily as needed for Nausea.     QUEtiapine 200 MG Tab  Commonly known as: SEROQUEL  Take 1 tablet (200 mg total) by mouth every evening.     senna-docusate 8.6-50 mg per tablet  Commonly known as: PERICOLACE  Take 2 tablets by mouth once daily.     VALTREX 500 MG tablet  Generic drug: valACYclovir  Valtrex 500 mg tablet   Take 1 tablet twice a day by oral route for 5 days.            STOP taking these medications      LORazepam 1 MG tablet  Commonly known as: ATIVAN              Indwelling Lines/Drains at time of discharge:   Lines/Drains/Airways       None                       Time spent on the discharge of patient: 40 minutes         Saul Mccray MD  Department of Hospital Medicine  [facility]

## 2025-07-20 NOTE — NURSING
Pt's mother was here with Pt throughout the day and brought up a incident that Pt might have 2 episodes of Seizure like activities approximately 1 hour apart around 1600, do not remember the length of the seizures. During the episodes mother explained that Pt felt like shaking her leg during sitting at the bedside and lay suddenly flat on the bed and felt like she was dying. Pt claimed she was unconscious during this. No staff witnessed this.

## 2025-07-20 NOTE — PLAN OF CARE
Problem: Violence Risk or Actual  Goal: Anger and Impulse Control  Outcome: Progressing     Problem: Infection  Goal: Absence of Infection Signs and Symptoms  Outcome: Progressing     Problem: Adult Inpatient Plan of Care  Goal: Plan of Care Review  Outcome: Progressing  Goal: Patient-Specific Goal (Individualized)  Outcome: Progressing  Goal: Absence of Hospital-Acquired Illness or Injury  Outcome: Progressing  Goal: Optimal Comfort and Wellbeing  Outcome: Progressing  Goal: Readiness for Transition of Care  Outcome: Progressing     Problem: Skin Injury Risk Increased  Goal: Skin Health and Integrity  Outcome: Progressing

## 2025-07-20 NOTE — NURSING
Waiting on transport to [facility]. Report given to Lia [phone] @[phone]. Personal belongings bag left at nursing station that her brought to bring to [facility].

## 2025-07-21 PROBLEM — E44.1 MILD MALNUTRITION: Status: ACTIVE | Noted: 2025-07-20

## 2025-07-23 ENCOUNTER — HOSPITAL ENCOUNTER (EMERGENCY)
Facility: HOSPITAL | Age: 45
Discharge: PSYCHIATRIC OR SUBSTANCE USE FACILITY | End: 2025-07-24
Attending: EMERGENCY MEDICINE
Payer: MEDICAID

## 2025-07-23 DIAGNOSIS — R10.9 ABDOMINAL PAIN: ICD-10-CM

## 2025-07-23 DIAGNOSIS — R34 ANURIA: ICD-10-CM

## 2025-07-23 DIAGNOSIS — K59.00 CONSTIPATION, UNSPECIFIED CONSTIPATION TYPE: Primary | ICD-10-CM

## 2025-07-23 PROCEDURE — 93010 ELECTROCARDIOGRAM REPORT: CPT | Mod: ,,, | Performed by: INTERNAL MEDICINE

## 2025-07-23 PROCEDURE — 93005 ELECTROCARDIOGRAM TRACING: CPT

## 2025-07-23 PROCEDURE — 99285 EMERGENCY DEPT VISIT HI MDM: CPT | Mod: 25

## 2025-07-23 RX ORDER — ONDANSETRON HYDROCHLORIDE 2 MG/ML
4 INJECTION, SOLUTION INTRAVENOUS
Status: DISCONTINUED | OUTPATIENT
Start: 2025-07-23 | End: 2025-07-23

## 2025-07-24 VITALS
TEMPERATURE: 98 F | DIASTOLIC BLOOD PRESSURE: 92 MMHG | SYSTOLIC BLOOD PRESSURE: 156 MMHG | HEART RATE: 90 BPM | OXYGEN SATURATION: 99 % | RESPIRATION RATE: 18 BRPM

## 2025-07-24 PROCEDURE — 25000003 PHARM REV CODE 250: Performed by: STUDENT IN AN ORGANIZED HEALTH CARE EDUCATION/TRAINING PROGRAM

## 2025-07-24 RX ORDER — SODIUM PHOSPHATE,MONO-DIBASIC 19G-7G/197
1 ENEMA (ML) RECTAL DAILY
Qty: 2 ENEMA | Refills: 0 | Status: SHIPPED | OUTPATIENT
Start: 2025-07-24 | End: 2025-07-24

## 2025-07-24 RX ORDER — SODIUM PHOSPHATE,MONO-DIBASIC 19G-7G/197
1 ENEMA (ML) RECTAL DAILY
Qty: 2 ENEMA | Refills: 0 | Status: SHIPPED | OUTPATIENT
Start: 2025-07-24 | End: 2025-07-26

## 2025-07-24 RX ORDER — SYRING-NEEDL,DISP,INSUL,0.3 ML 29 G X1/2"
296 SYRINGE, EMPTY DISPOSABLE MISCELLANEOUS ONCE
Qty: 296 ML | Refills: 0 | Status: SHIPPED | OUTPATIENT
Start: 2025-07-24 | End: 2025-07-24

## 2025-07-24 RX ADMIN — Medication 1 ENEMA: at 12:07

## 2025-07-24 NOTE — ED NOTES
Attempted again to get patient's blood work. Patient again not letting this RN obtain ordered blood work. MD updated

## 2025-07-24 NOTE — DISCHARGE INSTRUCTIONS
Patient is medically stable for psychiatric evaluation.  They may require ongoing therapy for constipation.

## 2025-07-24 NOTE — ED NOTES
Pt is refusing to get in gown due to iv kit not being placed on a sheet of sterile paper/pad prior to opening.

## 2025-07-24 NOTE — ED NOTES
PT. BROUGHT TO RESTROOM BY ER SITTER IN WHEEL CHAIR. SHE DID NOT HAVE A BOWEL MOVEMENT AFTER ENEMA. INFORMED DR. SHEN.

## 2025-07-24 NOTE — ED NOTES
Another sterile field was created in front of pt. Pt is still refusing blood work. RNGlo is explaining the purpose of the blood work. Pt appears anxious about having blood drain.

## 2025-07-24 NOTE — ED PROVIDER NOTES
Encounter Date: 7/23/2025       History     Chief Complaint   Patient presents with    Constipation     From [facility] under CEC, unable to urinate since yesterday, constipation x8 days. Lower abdomen tender to touch    Urinary Retention     HPI    44-year-old female with a past medical history of sarcoma status post right AKA, history of IV drug use, depression, anemia presents for concern for inability to urinate and constipation.  Patient reports being able to urinate once yesterday afternoon and has not been able to urinate since.  She reports this has happened previously around 2 weeks ago and states that she had a catheter brace briefly with resolution and she was able to urinate afterwards.  She reports that she has restarted her Lamictal recently but denies any additional medication changes.  She reports additionally having issues with constipation, states that this has been an ongoing issue for her, currently she is on a stool softener and MiraLax at [facility].  Reports she has been there for the last 2-3 days.  She reports having some abdominal pain worse in the lower portion and some mild nausea.  She reports this is happened previously and she has required an enema.  She denies any additional complaints.    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    Anxiety     Bone cancer     Depression     Hypertension 7/18/2025     Past Surgical History:   Procedure Laterality Date    LEG AMPUTATION AT HIP Left 7/2015     Family History   Problem Relation Name Age of Onset    Cancer Paternal Uncle great uncle         lung    Cancer Maternal Grandmother          breast    Heart attack Maternal Grandmother      Arthritis Maternal Grandmother      Heart attack Maternal Grandfather      Arthritis  Maternal Grandfather      Diabetes Paternal Grandmother      Hypertension Paternal Grandfather      Heart attack Paternal Grandfather      No Known Problems Father       Social History[1]  Review of Systems   Constitutional: Negative.    HENT: Negative.     Eyes: Negative.    Respiratory: Negative.     Cardiovascular: Negative.    Gastrointestinal:  Positive for abdominal pain, constipation and nausea.   Genitourinary:  Positive for difficulty urinating.   Musculoskeletal: Negative.    Skin: Negative.    Neurological: Negative.        Physical Exam     Initial Vitals [07/23/25 1934]   BP Pulse Resp Temp SpO2   (!) 138/91 80 18 98.2 °F (36.8 °C) 99 %      MAP       --         Physical Exam    Nursing note and vitals reviewed.  Constitutional: She appears well-developed. She is not diaphoretic. She appears distressed (mildly distressed appearing female, conversing with ease).   HENT:   Head: Normocephalic and atraumatic.   Nose: Nose normal.   Eyes: EOM are normal. Pupils are equal, round, and reactive to light.   Neck: Neck supple. No JVD present.   Normal range of motion.  Cardiovascular:  Normal rate, regular rhythm, normal heart sounds and intact distal pulses.           Pulmonary/Chest: Breath sounds normal. No stridor. No respiratory distress. She has no wheezes. She has no rales.   Abdominal: Abdomen is soft. Bowel sounds are normal. She exhibits no distension. There is abdominal tenderness (mild tenderness over the left lower quadrant and suprapubically, no rebound or guarding noted.). There is no rebound and no guarding.   Musculoskeletal:         General: No tenderness or edema.      Cervical back: Normal range of motion and neck supple.      Comments:   Right AKA     Neurological: She is alert and oriented to person, place, and time.   Skin: Skin is warm and dry. Capillary refill takes less than 2 seconds. No rash noted. No erythema. There is pallor.         ED Course   Procedures  Labs Reviewed - No data  to display       ECG Results              EKG 12-lead (In process)        Collection Time Result Time QRS Duration OHS QTC Calculation    07/23/25 20:12:04 07/24/25 07:55:30 86 469                     In process by Interface, Lab In [facility] (07/24/25 07:55:34)                   Narrative:    Test Reason : R34,    Vent. Rate :  73 BPM     Atrial Rate :  73 BPM     P-R Int : 132 ms          QRS Dur :  86 ms      QT Int : 426 ms       P-R-T Axes :  77  72 103 degrees    QTcB Int : 469 ms    Normal sinus rhythm  Nonspecific T wave abnormality  Abnormal ECG  No previous ECGs available    Referred By: AAAREFERRAL SELF           Confirmed By:                                   Imaging Results    None          Medications   sodium phosphates 19-7 gram/118 mL enema 1 enema (1 enema Rectal Given 7/24/25 0049)     Medical Decision Making  Amount and/or Complexity of Data Reviewed  Labs: ordered.  Radiology: ordered.    Risk  OTC drugs.  Prescription drug management.      MDM:      44-year-old female with a past medical history of sarcoma status post right AKA, history of IV drug use, depression, anemia presents for concern for inability to urinate and constipation. Differential Diagnosis includes:   Urinary retention, acute cystitis, acute renal failure, JAVAN, constipation, diverticulitis. Physical exam as noted above. ED workup notable for Lab work pending, KUB pending.  Will order IV fluid bolus and Zofran at this time.  She has mild left lower quadrant abdominal tenderness, pending straight catheter will continue treatment.   Will transition care at this time to oncoming emergency department doctor.      Brandan Lechuga     DISCLAIMER: This note was prepared with [employer] voice recognition transcription software.           Note was created using voice recognition software. Note may have occasional typographical or grammatical errors, garbled syntax, and other bizarre constructions that may not have been identified and edited  despite good radha initial review prior to signing.                  ED Course as of 07/24/25 0958   Wed Jul 23, 2025 2124   EKG shows normal sinus rhythm with nonspecific T-wave changes noted laterally,  MS, no STEMI. [BB]      ED Course User Index  [BB] Brandan Lechuga MD       Medically cleared for psychiatry placement: 7/24/2025  2:55 AM                       Clinical Impression:  Final diagnoses:  [R34] Anuria  [R10.9] Abdominal pain  [K59.00] Constipation, unspecified constipation type (Primary)          ED Disposition Condition    Transfer to Psych Facility Stable          ED Prescriptions       Medication Sig Dispense Start Date End Date Auth. Provider    lactulose (CHRONULAC) 20 gram/30 mL Soln  (Status: Discontinued) Take 30 mLs (20 g total) by mouth 2 (two) times daily. for 7 days 420 mL 7/24/2025 7/24/2025 Hawk Franz MD    magnesium citrate solution  (Status: Discontinued) Take 296 mLs by mouth once. for 1 dose 296 mL 7/24/2025 7/24/2025 Hawk Franz MD    sodium phosphates (FLEET ENEMA EXTRA) 19-7 gram/197 mL Enem  (Status: Discontinued) Place 1 enema rectally once daily. for 2 days 2 enema 7/24/2025 7/24/2025 Hawk Franz MD    lactulose (CHRONULAC) 20 gram/30 mL Soln Take 30 mLs (20 g total) by mouth 2 (two) times daily. for 7 days 420 mL 7/24/2025 7/31/2025 Hawk Franz MD    magnesium citrate solution (Expires today) Take 296 mLs by mouth once. for 1 dose 296 mL 7/24/2025 7/24/2025 Hawk Franz MD    sodium phosphates (FLEET ENEMA EXTRA) 19-7 gram/197 mL Enem Place 1 enema rectally once daily. for 2 days 2 enema 7/24/2025 7/26/2025 Hawk Franz MD          Follow-up Information    None                [1]   Social History  Tobacco Use    Smoking status: Every Day     Current packs/day: 1.00     Types: Cigarettes    Smokeless tobacco: Never   Substance Use Topics    Alcohol use: No    Drug use: No        Brandan Lechuga MD  07/24/25 0958

## 2025-07-24 NOTE — ED NOTES
Just spoke to pt. About IV access and getting labs. Attempted to do assessment and HX. on pt. and she stated that everything is in her chart and she didn't want to talk about anything.

## 2025-07-24 NOTE — ED PROVIDER NOTES
Patient received a sign-out from the outgoing ED physician, all elements of care discussed patent.  Patient presents from Behavioral Health unit on pec/CEC with a concern of urinary retention.  However ultrasound is done, and it shows no urinary retention.  She has less than 100 cc postvoid residual volume.  She had recent medical clearance labs that are unremarkable, then hypokalemia that was repleted, and had no UTI at that time.  Here in the ED she was extremely uncooperative, including refusing any sort of blood work, making statements that she was being attacked by nursing when they tried to do any basic or minimum evaluation, and refused likewise to attempt to provide urine.  She states that she believes that she is constipated as she has not been stooling much lately.  Patient requested assistance with this, and subsequently refused any interventions for it.  I offered her an enema, which after extensive deliberation she agreed to it however ultimately demanded to give it to herself, and it was on surprisingly unsuccessful.  As I understand per review of records this is overall consistent with her chronic medical and neurocognitive state and not an acute manifestation of anything.  While I do not believe that the patient necessarily has excellent capacity at this time in the setting of her chronic psychosis, she is lying in bed comfortably, speaking calmly, is not agitated, or combative, or even speaking profanely.  At this time proceeding with any such studies or interventions against her will would likely constitute assault both legally and pragmatically, and as such it was not done.    Patient is hemodynamically stable in no acute distress, well-appearing, euvolemic, and abdominal exam is entirely benign.  The history and examined vital signs is not consistent with any acute medical emergency.  A medical screening exam is performed and negative for acute process requiring intervention, she is medically  stable for psychiatric evaluation.  Discharged back to psychiatric facility with the prescriptions for constipation related medications.         Hawk Franz MD  07/24/25 0672

## 2025-07-24 NOTE — ED NOTES
C/C: CEC pt. From [facility] DX. SI, complains of constipation x 8 days and unable to urinate since yesterday. Pain 4/10. Denies painful urination and fever.

## 2025-07-26 LAB
OHS QRS DURATION: 86 MS
OHS QTC CALCULATION: 469 MS

## 2025-07-31 ENCOUNTER — HOSPITAL ENCOUNTER (EMERGENCY)
Facility: HOSPITAL | Age: 45
Discharge: HOME | End: 2025-08-01
Attending: EMERGENCY MEDICINE
Payer: MEDICAID

## 2025-07-31 DIAGNOSIS — R10.9 ABDOMINAL PAIN: ICD-10-CM

## 2025-07-31 DIAGNOSIS — K59.00 CONSTIPATION, UNSPECIFIED CONSTIPATION TYPE: Primary | ICD-10-CM

## 2025-07-31 PROBLEM — Z89.611 HX OF AKA (ABOVE KNEE AMPUTATION), RIGHT: Chronic | Status: ACTIVE | Noted: 2025-07-18

## 2025-07-31 PROBLEM — T50.912A SUICIDE ATTEMPT BY MULTIPLE DRUG OVERDOSE: Chronic | Status: ACTIVE | Noted: 2017-12-28

## 2025-07-31 PROBLEM — F19.90 IV DRUG USER: Chronic | Status: ACTIVE | Noted: 2017-12-29

## 2025-07-31 PROBLEM — F13.20 SEDATIVE HYPNOTIC OR ANXIOLYTIC DEPENDENCE: Chronic | Status: ACTIVE | Noted: 2017-12-31

## 2025-07-31 PROBLEM — F11.229 OPIOID DEPENDENCE WITH INTOXICATION: Chronic | Status: ACTIVE | Noted: 2017-12-29

## 2025-07-31 PROCEDURE — 84702 CHORIONIC GONADOTROPIN TEST: CPT

## 2025-07-31 PROCEDURE — 83690 ASSAY OF LIPASE: CPT

## 2025-07-31 PROCEDURE — 99285 EMERGENCY DEPT VISIT HI MDM: CPT | Mod: 25

## 2025-07-31 PROCEDURE — 84439 ASSAY OF FREE THYROXINE: CPT

## 2025-07-31 PROCEDURE — 80143 DRUG ASSAY ACETAMINOPHEN: CPT

## 2025-07-31 PROCEDURE — 80053 COMPREHEN METABOLIC PANEL: CPT

## 2025-07-31 PROCEDURE — 80179 DRUG ASSAY SALICYLATE: CPT

## 2025-07-31 PROCEDURE — 93010 ELECTROCARDIOGRAM REPORT: CPT | Mod: ,,, | Performed by: STUDENT IN AN ORGANIZED HEALTH CARE EDUCATION/TRAINING PROGRAM

## 2025-07-31 PROCEDURE — 85025 COMPLETE CBC W/AUTO DIFF WBC: CPT

## 2025-07-31 PROCEDURE — 84443 ASSAY THYROID STIM HORMONE: CPT

## 2025-07-31 PROCEDURE — 93005 ELECTROCARDIOGRAM TRACING: CPT

## 2025-07-31 RX ORDER — DROPERIDOL 2.5 MG/ML
1.25 INJECTION, SOLUTION INTRAMUSCULAR; INTRAVENOUS ONCE
Status: DISCONTINUED | OUTPATIENT
Start: 2025-08-01 | End: 2025-08-01

## 2025-08-01 VITALS
HEART RATE: 74 BPM | BODY MASS INDEX: 18.33 KG/M2 | OXYGEN SATURATION: 95 % | HEIGHT: 65 IN | WEIGHT: 110 LBS | SYSTOLIC BLOOD PRESSURE: 137 MMHG | RESPIRATION RATE: 16 BRPM | TEMPERATURE: 98 F | DIASTOLIC BLOOD PRESSURE: 91 MMHG

## 2025-08-01 LAB
ABSOLUTE EOSINOPHIL (OHS): 0.03 K/UL
ABSOLUTE MONOCYTE (OHS): 0.45 K/UL (ref 0.3–1)
ABSOLUTE NEUTROPHIL COUNT (OHS): 2.53 K/UL (ref 1.8–7.7)
ALBUMIN SERPL BCP-MCNC: 3.7 G/DL (ref 3.5–5.2)
ALP SERPL-CCNC: 57 UNIT/L (ref 40–150)
ALT SERPL W/O P-5'-P-CCNC: <8 UNIT/L (ref 0–55)
ANION GAP (OHS): 9 MMOL/L (ref 8–16)
APAP SERPL-MCNC: <3 UG/ML (ref 10–20)
AST SERPL-CCNC: 17 UNIT/L (ref 0–50)
BASOPHILS # BLD AUTO: 0.03 K/UL
BASOPHILS NFR BLD AUTO: 0.7 %
BILIRUB SERPL-MCNC: 0.4 MG/DL (ref 0.1–1)
BUN SERPL-MCNC: 5 MG/DL (ref 6–20)
CALCIUM SERPL-MCNC: 9.2 MG/DL (ref 8.7–10.5)
CHLORIDE SERPL-SCNC: 106 MMOL/L (ref 95–110)
CO2 SERPL-SCNC: 25 MMOL/L (ref 23–29)
CREAT SERPL-MCNC: 0.6 MG/DL (ref 0.5–1.4)
ERYTHROCYTE [DISTWIDTH] IN BLOOD BY AUTOMATED COUNT: 14.3 % (ref 11.5–14.5)
GFR SERPLBLD CREATININE-BSD FMLA CKD-EPI: >60 ML/MIN/1.73/M2
GLUCOSE SERPL-MCNC: 99 MG/DL (ref 70–110)
HCG INTACT+B SERPL-ACNC: <2.42 MIU/ML
HCT VFR BLD AUTO: 33.3 % (ref 37–48.5)
HGB BLD-MCNC: 11.4 GM/DL (ref 12–16)
IMM GRANULOCYTES # BLD AUTO: 0.02 K/UL (ref 0–0.04)
IMM GRANULOCYTES NFR BLD AUTO: 0.4 % (ref 0–0.5)
LIPASE SERPL-CCNC: 30 U/L (ref 4–60)
LYMPHOCYTES # BLD AUTO: 1.47 K/UL (ref 1–4.8)
MCH RBC QN AUTO: 28.6 PG (ref 27–31)
MCHC RBC AUTO-ENTMCNC: 34.2 G/DL (ref 32–36)
MCV RBC AUTO: 84 FL (ref 82–98)
NUCLEATED RBC (/100WBC) (OHS): 0 /100 WBC
OHS QRS DURATION: 84 MS
OHS QTC CALCULATION: 449 MS
PLATELET # BLD AUTO: 332 K/UL (ref 150–450)
PMV BLD AUTO: 9.3 FL (ref 9.2–12.9)
POTASSIUM SERPL-SCNC: 3.5 MMOL/L (ref 3.5–5.1)
PROT SERPL-MCNC: 6.4 GM/DL (ref 6–8.4)
RBC # BLD AUTO: 3.99 M/UL (ref 4–5.4)
RELATIVE EOSINOPHIL (OHS): 0.7 %
RELATIVE LYMPHOCYTE (OHS): 32.5 % (ref 18–48)
RELATIVE MONOCYTE (OHS): 9.9 % (ref 4–15)
RELATIVE NEUTROPHIL (OHS): 55.8 % (ref 38–73)
SALICYLATES SERPL-MCNC: <5 MG/DL (ref 15–30)
SODIUM SERPL-SCNC: 140 MMOL/L (ref 136–145)
T4 FREE SERPL-MCNC: 1.04 NG/DL (ref 0.71–1.51)
TSH SERPL-ACNC: 0.27 UIU/ML (ref 0.4–4)
WBC # BLD AUTO: 4.53 K/UL (ref 3.9–12.7)

## 2025-08-01 PROCEDURE — 25000003 PHARM REV CODE 250

## 2025-08-01 PROCEDURE — 25500020 PHARM REV CODE 255: Performed by: EMERGENCY MEDICINE

## 2025-08-01 PROCEDURE — 63600175 PHARM REV CODE 636 W HCPCS

## 2025-08-01 RX ORDER — ONDANSETRON 4 MG/1
4 TABLET, ORALLY DISINTEGRATING ORAL
Status: COMPLETED | OUTPATIENT
Start: 2025-08-01 | End: 2025-08-01

## 2025-08-01 RX ADMIN — ONDANSETRON 4 MG: 4 TABLET, ORALLY DISINTEGRATING ORAL at 12:08

## 2025-08-01 RX ADMIN — IOHEXOL 75 ML: 350 INJECTION, SOLUTION INTRAVENOUS at 02:08

## 2025-08-05 ENCOUNTER — PATIENT OUTREACH (OUTPATIENT)
Facility: OTHER | Age: 45
End: 2025-08-05
Payer: MEDICAID

## 2025-08-05 NOTE — PROGRESS NOTES
Jaz Flores LPN  ED Navigator  Emergency Department    Project: [facility] [place] Navigator  Role: Community Health Worker    Date: 08/05/2025  Patient Name: Val Gil  MRN: [id number]  PCP: Faby, Primary Doctor    Assessment:     Val Gil is a 44 y.o. female who has presented to ED for abdominal pain. Patient has visited the ED 5 times in the past 3 months. Patient did not contact PCP.     ED Navigator Initial Assessment    ED Navigator Enrollment Documentation  Consent to Services  Does patient consent to completing the assessment?: Yes  Contact  Method of Initial Contact: Phone  Transportation  Insurance Coverage  Do you have coverage/adequate coverage?: Yes  Specialist Appointment  Did the patient come to the ED to see a specialist?: No  Does the patient have a pending specialist referral?: No  Does the patient have a specialist appointment made?: No  PCP Follow Up Appointment  Medications  Is patient able to afford medication?: Yes  Psychological  Food  Communication/Education  Other Financial Concerns  Other Social Barriers/Concerns  Primary Barrier  Plan: Provided information for [facility] 24/7 Nurse triage line, [phone] or [phone] ([phone])         Social History     Socioeconomic History    Marital status: Single   Tobacco Use    Smoking status: Every Day     Current packs/day: 1.00     Types: Cigarettes    Smokeless tobacco: Never   Substance and Sexual Activity    Alcohol use: No    Drug use: No    Sexual activity: Not Currently   Other Topics Concern    Patient feels they ought to cut down on drinking/drug use No    Patient annoyed by others criticizing their drinking/drug use No    Patient has felt bad or guilty about drinking/drug use No    Patient has had a drink/used drugs as an eye opener in the AM No     Social Drivers of Health     Financial Resource Strain: Low Risk  (8/5/2025)    Overall Financial Resource Strain (CARDIA)     Difficulty of Paying Living Expenses:  Not hard at all   Food Insecurity: No Food Insecurity (7/19/2025)    Hunger Vital Sign     Worried About Running Out of Food in the Last Year: Never true     Ran Out of Food in the Last Year: Never true   Transportation Needs: No Transportation Needs (7/19/2025)    PRAPARE - Transportation     Lack of Transportation (Medical): No     Lack of Transportation (Non-Medical): No   Physical Activity: Patient Unable To Answer (6/13/2025)    Exercise Vital Sign     Days of Exercise per Week: Patient unable to answer     Minutes of Exercise per Session: Patient unable to answer   Stress: No Stress Concern Present (7/19/2025)    [employer] [place] - Occupational Stress Questionnaire     Feeling of Stress : Not at all   Housing Stability: Low Risk  (7/19/2025)    Housing Stability Vital Sign     Unable to Pay for Housing in the Last Year: No     Homeless in the Last Year: No       Plan:   Spoke with Pt and her Mom regarding their recent ED visit for abdominal pain. Mom wanted to review lab results and imaging from the last two visits. Results provided per ED Navigator and contact info. They do not want to schedule anything else at this time. ED navigator will follow-up with patient to assist as needed and to remind of upcoming appt's.   Surinder Flores          Appointment made with: No, Primary Doctor

## 2025-08-28 ENCOUNTER — HOSPITAL ENCOUNTER (EMERGENCY)
Facility: HOSPITAL | Age: 45
Discharge: HOME | End: 2025-08-28
Attending: EMERGENCY MEDICINE
Payer: MEDICAID

## 2025-08-28 VITALS
HEIGHT: 65 IN | WEIGHT: 110 LBS | OXYGEN SATURATION: 99 % | RESPIRATION RATE: 18 BRPM | BODY MASS INDEX: 18.33 KG/M2 | TEMPERATURE: 98 F | SYSTOLIC BLOOD PRESSURE: 165 MMHG | DIASTOLIC BLOOD PRESSURE: 92 MMHG | HEART RATE: 84 BPM

## 2025-08-28 DIAGNOSIS — Z77.9 HISTORY OF EXPOSURE TO NOXIOUS CHEMICAL: Primary | ICD-10-CM

## 2025-08-28 LAB
ANION GAP (OHS): 12 MMOL/L (ref 8–16)
BUN SERPL-MCNC: 10 MG/DL (ref 6–20)
CALCIUM SERPL-MCNC: 9.4 MG/DL (ref 8.7–10.5)
CHLORIDE SERPL-SCNC: 105 MMOL/L (ref 95–110)
CO2 SERPL-SCNC: 18 MMOL/L (ref 23–29)
CREAT SERPL-MCNC: 0.5 MG/DL (ref 0.5–1.4)
GFR SERPLBLD CREATININE-BSD FMLA CKD-EPI: >60 ML/MIN/1.73/M2
GLUCOSE SERPL-MCNC: 87 MG/DL (ref 70–110)
POTASSIUM SERPL-SCNC: 3.9 MMOL/L (ref 3.5–5.1)
SODIUM SERPL-SCNC: 135 MMOL/L (ref 136–145)

## 2025-08-28 PROCEDURE — 80048 BASIC METABOLIC PNL TOTAL CA: CPT | Performed by: EMERGENCY MEDICINE

## 2025-08-28 PROCEDURE — 99283 EMERGENCY DEPT VISIT LOW MDM: CPT
